# Patient Record
Sex: MALE | Race: WHITE | Employment: OTHER | URBAN - METROPOLITAN AREA
[De-identification: names, ages, dates, MRNs, and addresses within clinical notes are randomized per-mention and may not be internally consistent; named-entity substitution may affect disease eponyms.]

---

## 2017-05-30 PROBLEM — M47.812 CERVICAL ARTHRITIS: Status: ACTIVE | Noted: 2017-05-30

## 2017-10-12 ENCOUNTER — HOSPITAL ENCOUNTER (OUTPATIENT)
Dept: LAB | Age: 72
Discharge: HOME | End: 2017-10-12

## 2017-10-12 PROCEDURE — 88305 TISSUE EXAM BY PATHOLOGIST: CPT | Performed by: INTERNAL MEDICINE

## 2017-12-07 ENCOUNTER — HOSPITAL ENCOUNTER (OUTPATIENT)
Dept: CT IMAGING | Age: 72
Discharge: HOME | End: 2017-12-07
Attending: OTOLARYNGOLOGY
Payer: MEDICARE

## 2017-12-07 DIAGNOSIS — J32.9 CHRONIC SINUSITIS, UNSPECIFIED LOCATION: ICD-10-CM

## 2017-12-07 PROCEDURE — 70486 CT MAXILLOFACIAL W/O DYE: CPT

## 2018-01-08 ENCOUNTER — HOSPITAL ENCOUNTER (OUTPATIENT)
Dept: SURGERY | Age: 73
Discharge: HOME | End: 2018-01-08
Attending: OTOLARYNGOLOGY

## 2018-01-08 VITALS
BODY MASS INDEX: 32.21 KG/M2 | WEIGHT: 225 LBS | SYSTOLIC BLOOD PRESSURE: 180 MMHG | RESPIRATION RATE: 18 BRPM | HEART RATE: 62 BPM | TEMPERATURE: 96.5 F | HEIGHT: 70 IN | DIASTOLIC BLOOD PRESSURE: 84 MMHG | OXYGEN SATURATION: 96 %

## 2018-01-08 RX ORDER — FLUOCINOLONE ACETONIDE 0.25 MG/G
CREAM TOPICAL 2 TIMES DAILY
COMMUNITY

## 2018-01-08 RX ORDER — HYOSCYAMINE SULFATE 0.12 MG/1
0.12 TABLET SUBLINGUAL
COMMUNITY
End: 2018-08-29

## 2018-01-08 NOTE — PERIOP NOTES
Patient verified name, , and surgery as listed in [facility]. Type 1B surgery, PAT walk-in assessment complete. Labs per surgeon: No orders received at this time. Orders to be faxed from Dr. Raul Grimm office to [phone]. Labs per anesthesia protocol: None. EKG: None per anesthesia protocol. Hibiclens and instructions given per hospital policy. Patient provided with and instructed on educational handouts including Guide to Surgery, Pain Management, Hand Hygiene, Blood Transfusion Education, and [place] [facility] Brochure. Patient answered medical/surgical history questions at their best of ability. All prior to admission medications documented in [facility]. Original medication prescription bottle NOT visualized during patient appointment. Patient instructed to hold all vitamins 7 days prior to surgery and NSAIDS 5 days prior to surgery, patient verbalized understanding. Medications to be held: all vitamins/supplements, Celebrex, Diflorasone ointment, fluocinolone, hydrocortisone ointment. Patient instructed to continue previous medications as prescribed prior to surgery and to take the following medications the day of surgery according to anesthesia guidelines with a small sip of water: Tramadol if needed, Tamsulosin, Dipentum, Omeprazole, and Flexeril if needed. Patient teach back successful and patient demonstrates knowledge of instructions.

## 2018-01-14 ENCOUNTER — ANESTHESIA EVENT (OUTPATIENT)
Dept: SURGERY | Age: 73
End: 2018-01-14
Payer: MEDICARE

## 2018-01-15 ENCOUNTER — ANESTHESIA (OUTPATIENT)
Dept: SURGERY | Age: 73
End: 2018-01-15
Payer: MEDICARE

## 2018-01-15 ENCOUNTER — HOSPITAL ENCOUNTER (OUTPATIENT)
Age: 73
Setting detail: OUTPATIENT SURGERY
Discharge: HOME | End: 2018-01-15
Attending: OTOLARYNGOLOGY | Admitting: OTOLARYNGOLOGY
Payer: MEDICARE

## 2018-01-15 VITALS
OXYGEN SATURATION: 93 % | WEIGHT: 227 LBS | RESPIRATION RATE: 16 BRPM | HEART RATE: 59 BPM | TEMPERATURE: 97.4 F | SYSTOLIC BLOOD PRESSURE: 156 MMHG | DIASTOLIC BLOOD PRESSURE: 67 MMHG | BODY MASS INDEX: 32.57 KG/M2

## 2018-01-15 PROCEDURE — 76210000020 HC REC RM PH II FIRST 0.5 HR: Performed by: OTOLARYNGOLOGY

## 2018-01-15 PROCEDURE — 74011250636 HC RX REV CODE- 250/636

## 2018-01-15 PROCEDURE — 74011000250 HC RX REV CODE- 250: Performed by: OTOLARYNGOLOGY

## 2018-01-15 PROCEDURE — 77030018836 HC SOL IRR NACL ICUM -A: Performed by: OTOLARYNGOLOGY

## 2018-01-15 PROCEDURE — 77030036727 HC DEV INFL BLN SNUS SE DISP ACCL -B: Performed by: OTOLARYNGOLOGY

## 2018-01-15 PROCEDURE — 77030008477 HC STYL SATN SLP COVD -A: Performed by: ANESTHESIOLOGY

## 2018-01-15 PROCEDURE — 74011000250 HC RX REV CODE- 250: Performed by: ANESTHESIOLOGY

## 2018-01-15 PROCEDURE — 77030020782 HC GWN BAIR PAWS FLX 3M -B: Performed by: ANESTHESIOLOGY

## 2018-01-15 PROCEDURE — 76060000032 HC ANESTHESIA 0.5 TO 1 HR: Performed by: OTOLARYNGOLOGY

## 2018-01-15 PROCEDURE — C1726 CATH, BAL DIL, NON-VASCULAR: HCPCS | Performed by: OTOLARYNGOLOGY

## 2018-01-15 PROCEDURE — 77030008703 HC TU ET UNCUF COVD -A: Performed by: ANESTHESIOLOGY

## 2018-01-15 PROCEDURE — 74011250636 HC RX REV CODE- 250/636: Performed by: ANESTHESIOLOGY

## 2018-01-15 PROCEDURE — 77030036884 HC CATH GD SPNPLS RELV TIP DISP KT ACCL -G1: Performed by: OTOLARYNGOLOGY

## 2018-01-15 PROCEDURE — 74011000250 HC RX REV CODE- 250

## 2018-01-15 PROCEDURE — 76210000016 HC OR PH I REC 1 TO 1.5 HR: Performed by: OTOLARYNGOLOGY

## 2018-01-15 PROCEDURE — 77030006907 HC BLD SNUS SHV MEDT -C: Performed by: OTOLARYNGOLOGY

## 2018-01-15 PROCEDURE — 76010000138 HC OR TIME 0.5 TO 1 HR: Performed by: OTOLARYNGOLOGY

## 2018-01-15 PROCEDURE — 74011250637 HC RX REV CODE- 250/637: Performed by: OTOLARYNGOLOGY

## 2018-01-15 PROCEDURE — 74011250637 HC RX REV CODE- 250/637: Performed by: ANESTHESIOLOGY

## 2018-01-15 RX ORDER — PROPOFOL 10 MG/ML
INJECTION, EMULSION INTRAVENOUS AS NEEDED
Status: DISCONTINUED | OUTPATIENT
Start: 2018-01-15 | End: 2018-01-15 | Stop reason: HOSPADM

## 2018-01-15 RX ORDER — ROCURONIUM BROMIDE 10 MG/ML
INJECTION, SOLUTION INTRAVENOUS AS NEEDED
Status: DISCONTINUED | OUTPATIENT
Start: 2018-01-15 | End: 2018-01-15 | Stop reason: HOSPADM

## 2018-01-15 RX ORDER — OXYCODONE HYDROCHLORIDE 5 MG/1
10 TABLET ORAL
Status: DISCONTINUED | OUTPATIENT
Start: 2018-01-15 | End: 2018-01-16 | Stop reason: HOSPADM

## 2018-01-15 RX ORDER — OXYMETAZOLINE HCL 0.05 %
SPRAY, NON-AEROSOL (ML) NASAL AS NEEDED
Status: DISCONTINUED | OUTPATIENT
Start: 2018-01-15 | End: 2018-01-15 | Stop reason: HOSPADM

## 2018-01-15 RX ORDER — GLYCOPYRROLATE 0.2 MG/ML
INJECTION INTRAMUSCULAR; INTRAVENOUS AS NEEDED
Status: DISCONTINUED | OUTPATIENT
Start: 2018-01-15 | End: 2018-01-15 | Stop reason: HOSPADM

## 2018-01-15 RX ORDER — DIPHENHYDRAMINE HYDROCHLORIDE 50 MG/ML
12.5 INJECTION, SOLUTION INTRAMUSCULAR; INTRAVENOUS
Status: DISCONTINUED | OUTPATIENT
Start: 2018-01-15 | End: 2018-01-16 | Stop reason: HOSPADM

## 2018-01-15 RX ORDER — LABETALOL HYDROCHLORIDE 5 MG/ML
INJECTION, SOLUTION INTRAVENOUS AS NEEDED
Status: DISCONTINUED | OUTPATIENT
Start: 2018-01-15 | End: 2018-01-15 | Stop reason: HOSPADM

## 2018-01-15 RX ORDER — NEOSTIGMINE METHYLSULFATE 1 MG/ML
INJECTION INTRAVENOUS AS NEEDED
Status: DISCONTINUED | OUTPATIENT
Start: 2018-01-15 | End: 2018-01-15 | Stop reason: HOSPADM

## 2018-01-15 RX ORDER — HYDROMORPHONE HYDROCHLORIDE 2 MG/ML
0.5 INJECTION, SOLUTION INTRAMUSCULAR; INTRAVENOUS; SUBCUTANEOUS
Status: DISCONTINUED | OUTPATIENT
Start: 2018-01-15 | End: 2018-01-16 | Stop reason: HOSPADM

## 2018-01-15 RX ORDER — NALOXONE HYDROCHLORIDE 0.4 MG/ML
0.1 INJECTION, SOLUTION INTRAMUSCULAR; INTRAVENOUS; SUBCUTANEOUS
Status: DISCONTINUED | OUTPATIENT
Start: 2018-01-15 | End: 2018-01-16 | Stop reason: HOSPADM

## 2018-01-15 RX ORDER — LIDOCAINE HYDROCHLORIDE 10 MG/ML
0.1 INJECTION INFILTRATION; PERINEURAL AS NEEDED
Status: DISCONTINUED | OUTPATIENT
Start: 2018-01-15 | End: 2018-01-15 | Stop reason: HOSPADM

## 2018-01-15 RX ORDER — FENTANYL CITRATE 50 UG/ML
INJECTION, SOLUTION INTRAMUSCULAR; INTRAVENOUS AS NEEDED
Status: DISCONTINUED | OUTPATIENT
Start: 2018-01-15 | End: 2018-01-15 | Stop reason: HOSPADM

## 2018-01-15 RX ORDER — SODIUM CHLORIDE, SODIUM LACTATE, POTASSIUM CHLORIDE, CALCIUM CHLORIDE 600; 310; 30; 20 MG/100ML; MG/100ML; MG/100ML; MG/100ML
75 INJECTION, SOLUTION INTRAVENOUS CONTINUOUS
Status: DISCONTINUED | OUTPATIENT
Start: 2018-01-15 | End: 2018-01-15 | Stop reason: HOSPADM

## 2018-01-15 RX ORDER — ACETAMINOPHEN 500 MG
1000 TABLET ORAL ONCE
Status: COMPLETED | OUTPATIENT
Start: 2018-01-15 | End: 2018-01-15

## 2018-01-15 RX ORDER — LIDOCAINE HYDROCHLORIDE 20 MG/ML
INJECTION, SOLUTION EPIDURAL; INFILTRATION; INTRACAUDAL; PERINEURAL AS NEEDED
Status: DISCONTINUED | OUTPATIENT
Start: 2018-01-15 | End: 2018-01-15 | Stop reason: HOSPADM

## 2018-01-15 RX ORDER — FLUMAZENIL 0.1 MG/ML
0.2 INJECTION INTRAVENOUS AS NEEDED
Status: DISCONTINUED | OUTPATIENT
Start: 2018-01-15 | End: 2018-01-16 | Stop reason: HOSPADM

## 2018-01-15 RX ORDER — SODIUM CHLORIDE, SODIUM LACTATE, POTASSIUM CHLORIDE, CALCIUM CHLORIDE 600; 310; 30; 20 MG/100ML; MG/100ML; MG/100ML; MG/100ML
75 INJECTION, SOLUTION INTRAVENOUS CONTINUOUS
Status: DISCONTINUED | OUTPATIENT
Start: 2018-01-15 | End: 2018-01-16 | Stop reason: HOSPADM

## 2018-01-15 RX ORDER — OXYCODONE HYDROCHLORIDE 5 MG/1
5 TABLET ORAL
Status: DISCONTINUED | OUTPATIENT
Start: 2018-01-15 | End: 2018-01-16 | Stop reason: HOSPADM

## 2018-01-15 RX ORDER — LIDOCAINE HYDROCHLORIDE AND EPINEPHRINE 10; 10 MG/ML; UG/ML
INJECTION, SOLUTION INFILTRATION; PERINEURAL AS NEEDED
Status: DISCONTINUED | OUTPATIENT
Start: 2018-01-15 | End: 2018-01-15 | Stop reason: HOSPADM

## 2018-01-15 RX ADMIN — LIDOCAINE HYDROCHLORIDE 80 MG: 20 INJECTION, SOLUTION EPIDURAL; INFILTRATION; INTRACAUDAL; PERINEURAL at 08:25

## 2018-01-15 RX ADMIN — PROPOFOL 200 MG: 10 INJECTION, EMULSION INTRAVENOUS at 08:25

## 2018-01-15 RX ADMIN — ACETAMINOPHEN 1000 MG: 500 TABLET, FILM COATED ORAL at 07:19

## 2018-01-15 RX ADMIN — FENTANYL CITRATE 100 MCG: 50 INJECTION, SOLUTION INTRAMUSCULAR; INTRAVENOUS at 08:25

## 2018-01-15 RX ADMIN — GLYCOPYRROLATE 0.4 MG: 0.2 INJECTION INTRAMUSCULAR; INTRAVENOUS at 09:02

## 2018-01-15 RX ADMIN — SODIUM CHLORIDE, SODIUM LACTATE, POTASSIUM CHLORIDE, AND CALCIUM CHLORIDE 75 ML/HR: 600; 310; 30; 20 INJECTION, SOLUTION INTRAVENOUS at 07:19

## 2018-01-15 RX ADMIN — LIDOCAINE HYDROCHLORIDE 0.1 ML: 10 INJECTION, SOLUTION INFILTRATION; PERINEURAL at 07:19

## 2018-01-15 RX ADMIN — HYDROMORPHONE HYDROCHLORIDE 0.5 MG: 2 INJECTION, SOLUTION INTRAMUSCULAR; INTRAVENOUS; SUBCUTANEOUS at 10:00

## 2018-01-15 RX ADMIN — HYDROMORPHONE HYDROCHLORIDE 0.5 MG: 2 INJECTION, SOLUTION INTRAMUSCULAR; INTRAVENOUS; SUBCUTANEOUS at 09:38

## 2018-01-15 RX ADMIN — ROCURONIUM BROMIDE 40 MG: 10 INJECTION, SOLUTION INTRAVENOUS at 08:25

## 2018-01-15 RX ADMIN — HYDROMORPHONE HYDROCHLORIDE 0.5 MG: 2 INJECTION, SOLUTION INTRAMUSCULAR; INTRAVENOUS; SUBCUTANEOUS at 09:26

## 2018-01-15 RX ADMIN — LABETALOL HYDROCHLORIDE 10 MG: 5 INJECTION, SOLUTION INTRAVENOUS at 09:09

## 2018-01-15 RX ADMIN — NEOSTIGMINE METHYLSULFATE 3 MG: 1 INJECTION INTRAVENOUS at 09:02

## 2018-01-15 NOTE — ANESTHESIA POSTPROCEDURE EVALUATION
Post-Anesthesia Evaluation and Assessment    Patient: Natan Guerra MRN: [id number]  SSN: [id number]    YOB: 1945  Age: 74 y.o. Sex: male       Cardiovascular Function/Vital Signs  Visit Vitals    /82 (BP 1 Location: Right arm, BP Patient Position: At rest)    Pulse (!) 59    Temp 36.3 °C (97.4 °F)    Resp 16    Wt 103 kg (227 lb)    SpO2 93%    BMI 32.57 kg/m2       Patient is status post general anesthesia for Procedure(s):  BILATERAL MAXILLARY AND FRONTAL AND LEFT SPHENOID BALLOON SINUPLASTY WITH REMOVAL OF CONTENTS AND  LEFT MORENO BULLOSA  TURBINATE REDUCTION  LEFT EUSTACIAN TUBE BALLOON DILATION. Nausea/Vomiting: None    Postoperative hydration reviewed and adequate. Pain:  Pain Scale 1: Numeric (0 - 10) (01/15/18 1000)  Pain Intensity 1: 6 (01/15/18 1000)   Managed    Neurological Status:   Neuro (WDL): Exceptions to WDL (01/15/18 0913)  Neuro  Neurologic State: Drowsy (01/15/18 6510)  Orientation Level: Oriented to person (01/15/18 0913)  Cognition: Decreased attention/concentration; Follows commands (01/15/18 0913)  LUE Motor Response: Purposeful (01/15/18 0913)  LLE Motor Response: Purposeful (01/15/18 0913)  RUE Motor Response: Purposeful (01/15/18 0913)  RLE Motor Response: Purposeful (01/15/18 0913)   At baseline    Mental Status and Level of Consciousness: Arousable    Pulmonary Status:   O2 Device: Room air (01/15/18 1000)   Adequate oxygenation and airway patent    Complications related to anesthesia: None    Post-anesthesia assessment completed.  No concerns    Signed By: Cassie Moeller MD     January 15, 2018

## 2018-01-15 NOTE — ANESTHESIA PREPROCEDURE EVALUATION
Anesthetic History   No history of anesthetic complications            Review of Systems / Medical History  Patient summary reviewed and pertinent labs reviewed    Pulmonary  Within defined limits                 Neuro/Psych         Psychiatric history     Cardiovascular  Within defined limits                Exercise tolerance: >4 METS     GI/Hepatic/Renal  Within defined limits              Endo/Other        Obesity and arthritis     Other Findings              Physical Exam    Airway  Mallampati: II  TM Distance: 4 - 6 cm  Neck ROM: normal range of motion   Mouth opening: Normal     Cardiovascular    Rhythm: regular  Rate: normal         Dental  No notable dental hx       Pulmonary  Breath sounds clear to auscultation               Abdominal         Other Findings            Anesthetic Plan    ASA: 2  Anesthesia type: general            Anesthetic plan and risks discussed with: Patient and Spouse

## 2018-01-15 NOTE — OP NOTES
[facility] 63  OPERATIVE REPORT    Marito Escalera  MR#: [id number]  : 1945  ACCOUNT #: [de-identified]   DATE OF SERVICE: 01/15/2018    PREOPERATIVE DIAGNOSES:  Chronic sinusitis, inferior turbinate hypertrophy, nasal obstruction, chronic left eustachian tube dysfunction. POSTOPERATIVE DIAGNOSES:  Chronic sinusitis, inferior turbinate hypertrophy, nasal obstruction, chronic left eustachian tube dysfunction. PROCEDURES PERFORMED:  Left eustachian tube balloon dilation, bilateral submucosal resection of the inferior turbinates, left sphenoid balloon sinuplasty with removal of contents, bilateral frontal balloon sinuplasty with removal of contents, bilateral maxillary balloon sinuplasty with removal of contents. SURGEON:  Dr. Josey Loja. ASSISTANT:  None. ANESTHESIA:  General.    COMPLICATIONS:  None. ESTIMATED BLOOD LOSS:  50 mL. IMPLANTS:  None. HISTORY:  A 78-year-old male who came to see me in the office with a long history of chronic sinus issues. I have been seeing him for the last couple of years and it was about a year ago that I recommended he undergo balloon sinuplasty and at that point, he decided to hold off as he was starting to feel just a little bit better, but then he started getting worse again. He is having problems chronically with the left ear. He is getting facial pressure in the maxillary, ethmoid and frontal sinuses. He has had several sinus infections over the last year. He has been on nasal steroid sprays, antihistamines, he has been on antibiotics. The antibiotics do help the infection when he takes it, but there is nothing that is giving him any type of chronic improvement. There is nasal congestion that switches sides depending on which side he is lying on. He gets a nasal sound to his voice chronically. There is still a constant pressure in the left ear and it is on and off on the right side.   This is enough to bother him every day and it does affect his daily activity. Physical exam revealed a deviated nasal septum to the left. This was mild-to-moderate in nature. He had bilateral inferior turbinate hypertrophy. Ear exam was unremarkable that day in the office. I did check a CT scan, which showed there to be a left filiberto bullosa, bilateral maxillary, ethmoid, frontal and left sphenoid mucosal thickening. So, based on the history and physical exam, it was my recommendation he undergo a left eustachian tube balloon dilation, bilateral maxillary, bilateral frontal and left sphenoid balloon sinuplasty, possible biopsy. The procedure, risks and benefits were discussed with him in the office. All questions were answered and he is agreeable to the surgery. SURGERY:  The patient was identified in the preoperative waiting area. He was taken back to the operating room where he underwent general anesthesia. Approximately 4 mL of 1% lidocaine with epinephrine were injected into the inferior turbinates, middle turbinates and then Afrin-soaked pledgets were placed in each side of the nose and left there for a few minutes. These were then removed. Eustachian tube balloon dilation was done first.  The scope was placed in the left side of the nose. Photo was taken of the left eustachian tube opening. The scope was then placed on the right side of the nose visualizing the left eustachian tube opening and then under visualization, I was able to insert the balloon the full length of the balloon. Intraop and post-dilation pictures were taken. When the balloon was then inserted, the balloon was inflated to a pressure of 12, held for 2 minutes, which was done on a timer, deflated and then the balloon was removed. Again, the post-dilation picture was taken. Then, the nasal part was done next.   So, starting with the left sphenoid sinus, I was able to advance the guidewire and then advance the balloon. The balloon was inflated to a pressure of 12, deflated. The sinus was irrigated using 120 mL of saline. Reinspection revealed thickened mucosa along the floor, which was removed, and the sinus appeared to be open and clear. Frontal sinuses were done next. Starting on the left was the same procedure on the right. I was able to feed the guidewire and a balloon to the frontal sinuses. The balloons were inflated to a pressure of 12, deflated. The sinuses were irrigated using 120 mL of saline each. Reinspection revealed thick mucosa within the nasal frontal duct, which was removed and the sinuses appeared to be open and clear. Maxillary sinuses were done last.  Starting on the left was the same thing on the right. I was able to feed the guidewire and the balloons into the maxillary sinuses. The balloons were inflated to a pressure of 12, deflated. The sinuses were irrigated using 120 mL of saline each. Reinspection revealed thickened mucosa just inside the sinus openings posteriorly, which was removed and the sinuses appeared to be open and clear. There was also an accessory ostia that was found on the left side, which was left the way it was. It measured approximately 3 mm in size. The filiberto bullosa on the left was collapsed. Using a [place] elevator and then microdebrider with a 4 mm blade was used to remove the lateral portion of the filiberto bullosa, essentially opening and removing the filiberto bullosa. A knife was then used to make a small stab incision in the anterior portion of the inferior turbinates. Melodye Ray was used to create a small pocket between the bone of the inferior turbinate, mucosa medially and a microdebrider with a turbinate blade was used to reduce the prominent bone and tissue at the inferior turbinates bilaterally. Boies elevator was used to medialize and lateralize the inferior turbinates giving the patient a widely patent nasal airway.   Some blood was suctioned from the nose and nasopharynx. The patient was awakened and taken to the postop recovery room in stable condition.       DO JUSTICE Solis  D: 01/15/2018 09:07     T: 01/15/2018 09:37  JOB #: [id number]

## 2018-04-17 ENCOUNTER — HOSPITAL ENCOUNTER (OUTPATIENT)
Dept: SURGERY | Age: 73
Discharge: HOME | End: 2018-04-17

## 2018-04-18 VITALS — BODY MASS INDEX: 32.64 KG/M2 | WEIGHT: 228 LBS | HEIGHT: 70 IN

## 2018-04-22 ENCOUNTER — ANESTHESIA EVENT (OUTPATIENT)
Dept: SURGERY | Age: 73
End: 2018-04-22
Payer: MEDICARE

## 2018-04-23 ENCOUNTER — HOSPITAL ENCOUNTER (OUTPATIENT)
Age: 73
Setting detail: OUTPATIENT SURGERY
Discharge: HOME | End: 2018-04-23
Attending: OTOLARYNGOLOGY | Admitting: OTOLARYNGOLOGY
Payer: MEDICARE

## 2018-04-23 ENCOUNTER — ANESTHESIA (OUTPATIENT)
Dept: SURGERY | Age: 73
End: 2018-04-23
Payer: MEDICARE

## 2018-04-23 VITALS
SYSTOLIC BLOOD PRESSURE: 165 MMHG | HEART RATE: 59 BPM | HEIGHT: 70 IN | OXYGEN SATURATION: 95 % | WEIGHT: 228 LBS | BODY MASS INDEX: 32.64 KG/M2 | DIASTOLIC BLOOD PRESSURE: 82 MMHG | TEMPERATURE: 98.4 F | RESPIRATION RATE: 16 BRPM

## 2018-04-23 PROCEDURE — 76060000032 HC ANESTHESIA 0.5 TO 1 HR: Performed by: OTOLARYNGOLOGY

## 2018-04-23 PROCEDURE — 77030031139 HC SUT VCRL2 J&J -A: Performed by: OTOLARYNGOLOGY

## 2018-04-23 PROCEDURE — 74011250636 HC RX REV CODE- 250/636: Performed by: ANESTHESIOLOGY

## 2018-04-23 PROCEDURE — C1762 CONN TISS, HUMAN(INC FASCIA): HCPCS | Performed by: OTOLARYNGOLOGY

## 2018-04-23 PROCEDURE — 76210000016 HC OR PH I REC 1 TO 1.5 HR: Performed by: OTOLARYNGOLOGY

## 2018-04-23 PROCEDURE — 74011000250 HC RX REV CODE- 250: Performed by: OTOLARYNGOLOGY

## 2018-04-23 PROCEDURE — 74011250636 HC RX REV CODE- 250/636

## 2018-04-23 PROCEDURE — 74011250637 HC RX REV CODE- 250/637: Performed by: OTOLARYNGOLOGY

## 2018-04-23 PROCEDURE — 74011000250 HC RX REV CODE- 250

## 2018-04-23 PROCEDURE — 77030008703 HC TU ET UNCUF COVD -A: Performed by: ANESTHESIOLOGY

## 2018-04-23 PROCEDURE — 77030018836 HC SOL IRR NACL ICUM -A: Performed by: OTOLARYNGOLOGY

## 2018-04-23 PROCEDURE — 76010000154 HC OR TIME FIRST 0.5 HR: Performed by: OTOLARYNGOLOGY

## 2018-04-23 DEVICE — IMPLANTABLE DEVICE: Type: IMPLANTABLE DEVICE | Site: NOSE | Status: FUNCTIONAL

## 2018-04-23 RX ORDER — NALOXONE HYDROCHLORIDE 0.4 MG/ML
0.1 INJECTION, SOLUTION INTRAMUSCULAR; INTRAVENOUS; SUBCUTANEOUS AS NEEDED
Status: DISCONTINUED | OUTPATIENT
Start: 2018-04-23 | End: 2018-04-23 | Stop reason: HOSPADM

## 2018-04-23 RX ORDER — LIDOCAINE HYDROCHLORIDE 20 MG/ML
INJECTION, SOLUTION EPIDURAL; INFILTRATION; INTRACAUDAL; PERINEURAL AS NEEDED
Status: DISCONTINUED | OUTPATIENT
Start: 2018-04-23 | End: 2018-04-23 | Stop reason: HOSPADM

## 2018-04-23 RX ORDER — DIPHENHYDRAMINE HYDROCHLORIDE 50 MG/ML
12.5 INJECTION, SOLUTION INTRAMUSCULAR; INTRAVENOUS ONCE
Status: DISCONTINUED | OUTPATIENT
Start: 2018-04-23 | End: 2018-04-23 | Stop reason: HOSPADM

## 2018-04-23 RX ORDER — OXYCODONE HYDROCHLORIDE 5 MG/1
5 TABLET ORAL
Status: DISCONTINUED | OUTPATIENT
Start: 2018-04-23 | End: 2018-04-23 | Stop reason: HOSPADM

## 2018-04-23 RX ORDER — SODIUM CHLORIDE 0.9 % (FLUSH) 0.9 %
5-10 SYRINGE (ML) INJECTION EVERY 8 HOURS
Status: DISCONTINUED | OUTPATIENT
Start: 2018-04-23 | End: 2018-04-23 | Stop reason: HOSPADM

## 2018-04-23 RX ORDER — LIDOCAINE HYDROCHLORIDE 10 MG/ML
0.1 INJECTION INFILTRATION; PERINEURAL AS NEEDED
Status: DISCONTINUED | OUTPATIENT
Start: 2018-04-23 | End: 2018-04-23 | Stop reason: HOSPADM

## 2018-04-23 RX ORDER — LIDOCAINE HYDROCHLORIDE AND EPINEPHRINE 10; 10 MG/ML; UG/ML
INJECTION, SOLUTION INFILTRATION; PERINEURAL AS NEEDED
Status: DISCONTINUED | OUTPATIENT
Start: 2018-04-23 | End: 2018-04-23 | Stop reason: HOSPADM

## 2018-04-23 RX ORDER — PROPOFOL 10 MG/ML
INJECTION, EMULSION INTRAVENOUS AS NEEDED
Status: DISCONTINUED | OUTPATIENT
Start: 2018-04-23 | End: 2018-04-23 | Stop reason: HOSPADM

## 2018-04-23 RX ORDER — ONDANSETRON 2 MG/ML
4 INJECTION INTRAMUSCULAR; INTRAVENOUS ONCE
Status: DISCONTINUED | OUTPATIENT
Start: 2018-04-23 | End: 2018-04-23 | Stop reason: HOSPADM

## 2018-04-23 RX ORDER — SUCCINYLCHOLINE CHLORIDE 20 MG/ML
INJECTION INTRAMUSCULAR; INTRAVENOUS AS NEEDED
Status: DISCONTINUED | OUTPATIENT
Start: 2018-04-23 | End: 2018-04-23 | Stop reason: HOSPADM

## 2018-04-23 RX ORDER — ROCURONIUM BROMIDE 10 MG/ML
INJECTION, SOLUTION INTRAVENOUS AS NEEDED
Status: DISCONTINUED | OUTPATIENT
Start: 2018-04-23 | End: 2018-04-23 | Stop reason: HOSPADM

## 2018-04-23 RX ORDER — HYDROMORPHONE HYDROCHLORIDE 2 MG/ML
0.5 INJECTION, SOLUTION INTRAMUSCULAR; INTRAVENOUS; SUBCUTANEOUS
Status: DISCONTINUED | OUTPATIENT
Start: 2018-04-23 | End: 2018-04-23 | Stop reason: HOSPADM

## 2018-04-23 RX ORDER — ACETAMINOPHEN 500 MG
500 TABLET ORAL ONCE
Status: DISCONTINUED | OUTPATIENT
Start: 2018-04-23 | End: 2018-04-23 | Stop reason: HOSPADM

## 2018-04-23 RX ORDER — SODIUM CHLORIDE, SODIUM LACTATE, POTASSIUM CHLORIDE, CALCIUM CHLORIDE 600; 310; 30; 20 MG/100ML; MG/100ML; MG/100ML; MG/100ML
INJECTION, SOLUTION INTRAVENOUS
Status: DISCONTINUED | OUTPATIENT
Start: 2018-04-23 | End: 2018-04-23 | Stop reason: HOSPADM

## 2018-04-23 RX ORDER — OXYCODONE HYDROCHLORIDE 5 MG/1
10 TABLET ORAL
Status: DISCONTINUED | OUTPATIENT
Start: 2018-04-23 | End: 2018-04-23 | Stop reason: HOSPADM

## 2018-04-23 RX ORDER — SODIUM CHLORIDE 0.9 % (FLUSH) 0.9 %
5-10 SYRINGE (ML) INJECTION AS NEEDED
Status: DISCONTINUED | OUTPATIENT
Start: 2018-04-23 | End: 2018-04-23 | Stop reason: HOSPADM

## 2018-04-23 RX ORDER — SODIUM CHLORIDE, SODIUM LACTATE, POTASSIUM CHLORIDE, CALCIUM CHLORIDE 600; 310; 30; 20 MG/100ML; MG/100ML; MG/100ML; MG/100ML
1000 INJECTION, SOLUTION INTRAVENOUS CONTINUOUS
Status: DISCONTINUED | OUTPATIENT
Start: 2018-04-23 | End: 2018-04-23 | Stop reason: HOSPADM

## 2018-04-23 RX ORDER — MIDAZOLAM HYDROCHLORIDE 1 MG/ML
2 INJECTION, SOLUTION INTRAMUSCULAR; INTRAVENOUS
Status: DISCONTINUED | OUTPATIENT
Start: 2018-04-23 | End: 2018-04-23 | Stop reason: HOSPADM

## 2018-04-23 RX ORDER — SODIUM CHLORIDE, SODIUM LACTATE, POTASSIUM CHLORIDE, CALCIUM CHLORIDE 600; 310; 30; 20 MG/100ML; MG/100ML; MG/100ML; MG/100ML
75 INJECTION, SOLUTION INTRAVENOUS CONTINUOUS
Status: DISCONTINUED | OUTPATIENT
Start: 2018-04-23 | End: 2018-04-23 | Stop reason: HOSPADM

## 2018-04-23 RX ORDER — OXYMETAZOLINE HCL 0.05 %
SPRAY, NON-AEROSOL (ML) NASAL AS NEEDED
Status: DISCONTINUED | OUTPATIENT
Start: 2018-04-23 | End: 2018-04-23 | Stop reason: HOSPADM

## 2018-04-23 RX ORDER — FENTANYL CITRATE 50 UG/ML
INJECTION, SOLUTION INTRAMUSCULAR; INTRAVENOUS AS NEEDED
Status: DISCONTINUED | OUTPATIENT
Start: 2018-04-23 | End: 2018-04-23 | Stop reason: HOSPADM

## 2018-04-23 RX ORDER — FENTANYL CITRATE 50 UG/ML
100 INJECTION, SOLUTION INTRAMUSCULAR; INTRAVENOUS AS NEEDED
Status: DISCONTINUED | OUTPATIENT
Start: 2018-04-23 | End: 2018-04-23 | Stop reason: HOSPADM

## 2018-04-23 RX ADMIN — PROPOFOL 50 MG: 10 INJECTION, EMULSION INTRAVENOUS at 15:02

## 2018-04-23 RX ADMIN — ROCURONIUM BROMIDE 5 MG: 10 INJECTION, SOLUTION INTRAVENOUS at 14:52

## 2018-04-23 RX ADMIN — SODIUM CHLORIDE, SODIUM LACTATE, POTASSIUM CHLORIDE, CALCIUM CHLORIDE: 600; 310; 30; 20 INJECTION, SOLUTION INTRAVENOUS at 14:46

## 2018-04-23 RX ADMIN — FENTANYL CITRATE 100 MCG: 50 INJECTION, SOLUTION INTRAMUSCULAR; INTRAVENOUS at 14:48

## 2018-04-23 RX ADMIN — LIDOCAINE HYDROCHLORIDE 80 MG: 20 INJECTION, SOLUTION EPIDURAL; INFILTRATION; INTRACAUDAL; PERINEURAL at 14:52

## 2018-04-23 RX ADMIN — PROPOFOL 150 MG: 10 INJECTION, EMULSION INTRAVENOUS at 14:52

## 2018-04-23 RX ADMIN — SODIUM CHLORIDE, SODIUM LACTATE, POTASSIUM CHLORIDE, AND CALCIUM CHLORIDE 1000 ML: 600; 310; 30; 20 INJECTION, SOLUTION INTRAVENOUS at 14:09

## 2018-04-23 RX ADMIN — SUCCINYLCHOLINE CHLORIDE 100 MG: 20 INJECTION INTRAMUSCULAR; INTRAVENOUS at 14:52

## 2018-04-23 NOTE — ANESTHESIA POSTPROCEDURE EVALUATION
Post-Anesthesia Evaluation and Assessment    Patient: Mohini Marquez MRN: [id number]  SSN: [id number]    YOB: 1945  Age: 74 y.o. Sex: male       Cardiovascular Function/Vital Signs  Visit Vitals    /82    Pulse 68    Temp 36.9 °C (98.4 °F)    Resp 16    Ht 5' 10\" (1.778 m)    Wt 103.4 kg (228 lb)    SpO2 95%    BMI 32.71 kg/m2       Patient is status post general anesthesia for Procedure(s):  LEFT INTERNAL NASAL VALVE REPAIR WITH  GRAFT/ CADAVER CARTILEDGE. Nausea/Vomiting: None    Postoperative hydration reviewed and adequate. Pain:  Pain Scale 1: Numeric (0 - 10) (04/23/18 1521)  Pain Intensity 1: 0 (04/23/18 1521)   Managed    Neurological Status:   Neuro (WDL): Exceptions to WDL (04/23/18 1521)  Neuro  Neurologic State: Drowsy (04/23/18 1521)  Orientation Level: Oriented to person (04/23/18 1521)   At baseline    Mental Status and Level of Consciousness: Arousable    Pulmonary Status:   O2 Device: Room air (04/23/18 1521)   Adequate oxygenation and airway patent    Complications related to anesthesia: None    Post-anesthesia assessment completed.  No concerns    Signed By: Kelly Reinoso MD     April 23, 2018

## 2018-04-23 NOTE — DISCHARGE INSTRUCTIONS
INSTRUCTIONS FOLLOWING NASAL SURGERY      ACTIVITY   Do NOT blow your nose.  Use nasal saline frequently; every 20 minutes while awake.  Listen to your nose; if it hurts, don't do it. DIET   Clear, cool liquids the first day; then soft diet the second day   Advance to regular diet on third day, unless your doctor orders otherwise.  If nausea and vomiting continues, call your doctor. PAIN   Take pain medication as directed by your doctor.  Call your doctor if pain is NOT relieved by medication.  DO NOT take aspirin or blood thinners until directed by your doctor. FOLLOW-UP PHONE [address] will be made by nursing staff   If you have any problems, call your doctor as needed. CALL YOUR DOCTOR IF   Temperature of 10 1°F or above   Green or yellow discharge from nose    AFTER ANESTHESIA   For the first 24 hours: DO NOT Drive, Drink alcoholic beverages, or Make important decisions.  Be aware of dizziness following anesthesia and while taking pain medication.

## 2018-04-23 NOTE — IP AVS SNAPSHOT
[address] [address] 
[phone] Patient: Zeenat Guevara MRN: [id number] ZYL:[id number] About your hospitalization You were admitted on:  April 23, 2018 You last received care in the:  [facility] PACU You were discharged on:  April 23, 2018 Why you were hospitalized Your primary diagnosis was:  Not on File Follow-up Information Follow up With Details Comments Contact Info Thereasa Fothergill, [facility] [address] [phone] Your Scheduled Appointments Monday April 30, 2018  8:55 AM EDT  
POST OP with Nahed Dumont MD  
[facility] [address] [facility]) [address] [place] [address]  
[phone] Discharge Orders None A check lulu indicates which time of day the medication should be taken. My Medications ASK your doctor about these medications Instructions Each Dose to Equal  
 Morning Noon Evening Bedtime  
 aspirin 81 mg chewable tablet Your last dose was: Your next dose is: Take 81 mg by mouth every other day. 81 mg  
    
   
   
   
  
 B-100 PO Your last dose was: Your next dose is: Take  by mouth daily. celecoxib 200 mg capsule Commonly known as:  CELEBREX Your last dose was: Your next dose is: Take 1 Cap by mouth daily. Indications: OSTEOARTHRITIS  
 200 mg CHLOR-TABLET PO Your last dose was: Your next dose is: Take 1 Tab by mouth two (2) times daily as needed. 1 Tab  
    
   
   
   
  
 cyclobenzaprine 10 mg tablet Commonly known as:  FLEXERIL Your last dose was: Your next dose is: Take 1 Tab by mouth three (3) times daily as needed for Muscle Spasm(s).   
 10 mg  
    
   
   
   
  
 diflorasone diacetate 0.05 % ointment Commonly known as:  PSORCON Your last dose was: Your next dose is:    
   
   
 Apply  to affected area two (2) times daily as needed for Skin Irritation. FISH OIL 1,000 mg Cap Generic drug:  omega-3 fatty acids-vitamin e Your last dose was: Your next dose is: Take 1 Cap by mouth daily. 1 Cap  
    
   
   
   
  
 fluocinoLONE 0.025 % topical cream  
Commonly known as:  SYNALAR Your last dose was: Your next dose is:    
   
   
 Apply  to affected area two (2) times a day. fluticasone 50 mcg/actuation nasal spray Commonly known as:  Deetta Britain Your last dose was: Your next dose is:    
   
   
 USE 2 SPRAYS TO EACH NOSTRIL DAILY Gluc-Kang-MSM#1-Vit C-Marcelino-Bor 500-416.6-20 mg Tab Your last dose was: Your next dose is: Take  by mouth three (3) times daily. HYDROcodone-acetaminophen 5-325 mg per tablet Commonly known as:  Mathew Kenyon Your last dose was: Your next dose is: Take 1-2 tablets every 4-6 hours prn pain  
     
   
   
   
  
 ketoconazole 2 % topical cream  
Commonly known as:  NIZORAL Your last dose was: Your next dose is:    
   
   
 Apply  to affected area two (2) times daily as needed for Skin Irritation. LEVSIN/SL 0.125 mg SL tablet Generic drug:  hyoscyamine SL Your last dose was: Your next dose is:    
   
   
 0.125 mg by SubLINGual route every six (6) hours as needed for Cramping.  
 0.125 mg  
    
   
   
   
  
 loratadine 10 mg dissolvable tablet Commonly known as:  Eleazar Meyers Your last dose was: Your next dose is: Take 10 mg by mouth daily as needed. 10 mg  
    
   
   
   
  
 olsalazine 250 mg capsule Commonly known as:  DIPENTUM  
   
 Your last dose was: Your next dose is:    
   
   
 2 tablets every po daily  
     
   
   
   
  
 omeprazole 20 mg capsule Commonly known as:  PRILOSEC Your last dose was: Your next dose is: TAKE 1 CAPSULE DAILY FOR GASTROESOPHAGEAL REFLUX  
     
   
   
   
  
 ondansetron 4 mg disintegrating tablet Commonly known as:  ZOFRAN ODT Your last dose was: Your next dose is: Take 1 Tab by mouth every eight (8) hours as needed for Nausea. 4 mg  
    
   
   
   
  
 potassium 99 mg tablet Your last dose was: Your next dose is: Take 99 mg by mouth daily as needed. 99 mg  
    
   
   
   
  
 pseudoephedrine 30 mg tablet Commonly known as:  SUDAFED Your last dose was: Your next dose is: Take  by mouth every four (4) hours as needed for Congestion. tamsulosin 0.4 mg capsule Commonly known as:  FLOMAX Your last dose was: Your next dose is: TAKE 1 CAPSULE DAILY  
     
   
   
   
  
 traMADol 50 mg tablet Commonly known as:  ULTRAM  
   
Your last dose was: Your next dose is: Take 1 Tab by mouth every six (6) hours as needed for Pain. Max Daily Amount: 200 mg.  
 50 mg Opioid Education Prescription Opioids: What You Need to Know: 
 
 
 
ACTIVITY  Do NOT blow your nose.  Use nasal saline frequently; every 20 minutes while awake.  Listen to your nose; if it hurts, don't do it. DIET  Clear, cool liquids the first day; then soft diet the second day  Advance to regular diet on third day, unless your doctor orders otherwise.  If nausea and vomiting continues, call your doctor. PAIN 
 Take pain medication as directed by your doctor.  Call your doctor if pain is NOT relieved by medication.  DO NOT take aspirin or blood thinners until directed by your doctor. FOLLOW-UP PHONE CALLS  Calls will be made by nursing staff  If you have any problems, call your doctor as needed. CALL YOUR DOCTOR IF 
 Temperature of 10 1°F or above  Green or yellow discharge from nose AFTER ANESTHESIA  For the first 24 hours: DO NOT Drive, Drink alcoholic beverages, or Make important decisions.  Be aware of dizziness following anesthesia and while taking pain medication. ACO Transitions of Care Introducing [employer] [address] [facility]on offers a voluntary care coordination program to provide high quality service and care to [facility] fee-for-service beneficiaries. Usama Hunt was designed to help you enhance your health and well-being through the following services: ? Transitions of Care  support for individuals who are transitioning from one care setting to another (example: Hospital to home). ? Chronic and Complex Care Coordination  support for individuals and caregivers of those with serious or chronic illnesses or with more than one chronic (ongoing) condition and those who take a number of different medications. If you meet specific medical criteria, a [facility] [address] may call you directly to coordinate your care with your primary care physician and your other care providers. For questions about the [facility] programs, please, contact your physicians office. For general questions or additional information about Accountable Care Organizations: 
Please visit www.medicare.gov/acos. html or call 1-800-MEDICARE (1-328.971.9327) [facility] users should call 7-925.452.7535. Introducing [facility]! [address] [place] [address] introduces [employer] patient portal. Now you can access parts of your medical record, email your doctor's office, and request medication refills online. 1. In your internet browser, go to [email or web address] 2. Click on the First Time User? Click Here link in the Sign In box. You will see the New Member Sign Up page. 3. Enter your [employer] Access Code exactly as it appears below. You will not need to use this code after youve completed the sign-up process. If you do not sign up before the expiration date, you must request a new code. · [employer] Access Code: [email or web address] Expires: 7/9/2018  4:12 PM 
 
4. Enter the last four digits of your Social Security Number (xxxx) and Date of Birth (mm/dd/yyyy) as indicated and click Submit. You will be taken to the next sign-up page. 5. Create a [employer] ID. This will be your [employer] login ID and cannot be changed, so think of one that is secure and easy to remember. 6. Create a [employer] password. You can change your password at any time. 7. Enter your Password Reset Question and Answer. This can be used at a later time if you forget your password. 8. Enter your e-mail address. You will receive e-mail notification when new information is available in [address] 9. Click Sign Up. You can now view and download portions of your medical record. 10. Click the Download Summary menu link to download a portable copy of your medical information. If you have questions, please visit the Frequently Asked Questions section of the [employer] website. Remember, [employer] is NOT to be used for urgent needs. For medical emergencies, dial 911. Now available from your iPhone and Android! Introducing Shailesh Michel As a [address] [place] [address] patient, I wanted to make you aware of our electronic visit tool called Shailesh Michel. [employer] allows you to connect within minutes with a medical provider 24 hours a day, seven days a week via a mobile device or tablet or logging into a secure website from your computer. You can access [employer] from anywhere in the [place]. A virtual visit might be right for you when you have a simple condition and feel like you just dont want to get out of bed, or cant get away from work for an appointment, when your [facility]  provider is not available (evenings, weekends or holidays), or when youre out of town and need minor care. Electronic visits cost only $49 and if the [employer] provider determines a prescription is needed to treat your condition, one can be electronically transmitted to a nearby pharmacy*. Please take a moment to enroll today if you have not already done so. The enrollment process is free and takes just a few minutes. To enroll, please download the [employer] mel to your tablet or phone, or visit [email or web address] to enroll on your computer. And, as an [address] [place] [address] patient with a phorus account, the results of your visits will be scanned into your electronic medical record and your primary care provider will be able to view the scanned results. We urge you to continue to see your regular [facility]  provider for your ongoing medical care. And while your primary care provider may not be the one available when you seek a [employer] virtual visit, the peace of mind you get from getting a real diagnosis real time can be priceless. For more information on [employer], view our Frequently Asked Questions (FAQs) at [email or web address]. Sincerely, 
 
Tamiko Bassett MD 
Chief Medical Officer [facility] *:  certain medications cannot be prescribed via [employer] Providers Seen During Your Hospitalization Provider Specialty Primary office phone Gary Joyner DO Otolaryngology [phone] Your Primary Care Physician (PCP) Primary Care Physician Office Phone Office Fax [facility] [phone] You are allergic to the following Allergen Reactions Latex Other (comments) \"it burns\" No Known Drug Allergies Other (comments) Other Plant, Animal, Environmental Other (comments) DUST POLLENS 
[employer] Recent Documentation Height Weight BMI Smoking Status 1.778 m 103.4 kg 32.71 kg/m2 Never Smoker Emergency Contacts Name Discharge Info Relation Home Work Mobile [address] CAREGIVER [3] Son [22] [phone] [facility] DISCHARGE CAREGIVER [3] Spouse [3] [phone] Patient Belongings The following personal items are in your possession at time of discharge: 
  Dental Appliances: None Please provide this summary of care documentation to your next provider. Signatures-by signing, you are acknowledging that this After Visit Summary has been reviewed with you and you have received a copy. Patient Signature:  ____________________________________________________________ Date:  ____________________________________________________________  
  
[facility] Provider Signature:  ____________________________________________________________ Date:  ____________________________________________________________

## 2018-04-24 NOTE — OP NOTES
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 PROCEDURE: Left internal nasal valve repair with  graft. SURGEON: Leanne Vail DO.    ASSISTANT:  None. ANESTHESIA:  General.    COMPLICATIONS:  None. BLOOD LOSS:  Less than 5 mL. HISTORY: A 68-year-old male who came to see me in the office. He did undergo septoplasty and inferior turbinate reduction, along with some other things, and from that surgery, he has actually done very well and he is breathing a lot better. The problem is that even though he is breathing a lot better, he still notices a severe amount of left-sided nasal obstruction, again improved, but still severe to the point where it is causing him to mouth breathe. He notices an obvious difference between the two sides. Reinspection of the nose reveals the septum to be in the midline, appears to be straight. Inferior turbinate is small with anything at a level of normal inhalation and anything stronger than that, visualization of the internal nasal valve shows collapse of the left side, which causes nasal obstruction. He does have a positive Dickson test on the left, so based on history and physical exam, it was my recommendation that he undergo an internal nasal valve repair with  grafts on the left side, so procedure, risks and benefits were discussed with him in the office. All questions were answered and he is agreeable to the surgery. DESCRIPTION OF PROCEDURE: The patient was identified in the preoperative waiting area. He was taken back to the operating room where he underwent general anesthesia. Approximately 1 mL 1% lidocaine with epinephrine was injected in the intercartilaginous area on the left side. I was able to make a small stab incision where the upper lateral cartilage would meet the septum in the intercartilaginous space, and I did use a Austin to create a small pocket so  the upper lateral cartilage from the septum. I then took a piece of cadaver rib cartilage. It was cut to size measuring approximately 6-mm x 2 x 2-mm, and this was then inserted into that preformed pocket. Vicryl 4-0 was used to close the incision. At that point, the patient appeared to have more open internal nasal valve, and so the patient was then awakened and taken to the postop recovery room in stable condition.       DO JUSTICE [place] / [place]  D: 04/23/2018 16:10     T: 04/24/2018 09:31  JOB #: [id number]

## 2018-08-22 PROBLEM — M15.9 OSTEOARTHRITIS OF MULTIPLE JOINTS: Status: ACTIVE | Noted: 2018-08-22

## 2019-07-23 PROBLEM — Z87.448 PERSONAL HISTORY OF URETHRAL STRICTURE: Status: ACTIVE | Noted: 2019-04-09

## 2019-07-23 PROBLEM — N35.812 OTHER URETHRAL BULBOUS STRICTURE, MALE: Status: ACTIVE | Noted: 2018-12-28

## 2019-07-23 PROBLEM — R39.9 LOWER URINARY TRACT SYMPTOMS (LUTS): Status: ACTIVE | Noted: 2018-12-28

## 2019-07-23 PROBLEM — N40.0 BPH (BENIGN PROSTATIC HYPERPLASIA): Status: ACTIVE | Noted: 2018-11-16

## 2019-11-01 ENCOUNTER — HOSPITAL ENCOUNTER (OUTPATIENT)
Dept: LAB | Age: 74
Discharge: HOME | End: 2019-11-01

## 2019-11-01 PROCEDURE — 88312 SPECIAL STAINS GROUP 1: CPT

## 2019-11-01 PROCEDURE — 88305 TISSUE EXAM BY PATHOLOGIST: CPT

## 2020-08-14 ENCOUNTER — HOSPITAL ENCOUNTER (OUTPATIENT)
Dept: CT IMAGING | Age: 75
Discharge: HOME | End: 2020-08-14
Attending: PHYSICIAN ASSISTANT
Payer: MEDICARE

## 2020-08-14 DIAGNOSIS — J32.9 CHRONIC SINUSITIS, UNSPECIFIED LOCATION: ICD-10-CM

## 2020-08-14 PROCEDURE — 70486 CT MAXILLOFACIAL W/O DYE: CPT

## 2021-03-31 ENCOUNTER — HOSPITAL ENCOUNTER (OUTPATIENT)
Age: 76
Setting detail: OBSERVATION
Discharge: HOME | End: 2021-04-01
Attending: EMERGENCY MEDICINE | Admitting: FAMILY MEDICINE
Payer: MEDICARE

## 2021-03-31 ENCOUNTER — APPOINTMENT (OUTPATIENT)
Dept: CT IMAGING | Age: 76
End: 2021-03-31
Attending: EMERGENCY MEDICINE
Payer: MEDICARE

## 2021-03-31 DIAGNOSIS — R29.90 STROKE-LIKE SYMPTOM: Primary | ICD-10-CM

## 2021-03-31 PROBLEM — M47.812 CERVICAL ARTHRITIS: Status: RESOLVED | Noted: 2017-05-30 | Resolved: 2021-03-31

## 2021-03-31 PROBLEM — N35.812 OTHER URETHRAL BULBOUS STRICTURE, MALE: Status: RESOLVED | Noted: 2018-12-28 | Resolved: 2021-03-31

## 2021-03-31 PROBLEM — I10 ESSENTIAL HYPERTENSION: Chronic | Status: ACTIVE | Noted: 2021-03-31

## 2021-03-31 PROBLEM — M15.9 OSTEOARTHRITIS OF MULTIPLE JOINTS: Status: RESOLVED | Noted: 2018-08-22 | Resolved: 2021-03-31

## 2021-03-31 PROBLEM — G45.9 TIA (TRANSIENT ISCHEMIC ATTACK): Status: ACTIVE | Noted: 2021-03-31

## 2021-03-31 PROBLEM — R39.9 LOWER URINARY TRACT SYMPTOMS (LUTS): Status: RESOLVED | Noted: 2018-12-28 | Resolved: 2021-03-31

## 2021-03-31 PROBLEM — Z87.448 PERSONAL HISTORY OF URETHRAL STRICTURE: Status: RESOLVED | Noted: 2019-04-09 | Resolved: 2021-03-31

## 2021-03-31 LAB
ANION GAP SERPL CALC-SCNC: 2 MMOL/L (ref 7–16)
BASOPHILS # BLD: 0.1 K/UL (ref 0–0.2)
BASOPHILS NFR BLD: 1 % (ref 0–2)
BUN SERPL-MCNC: 27 MG/DL (ref 8–23)
CALCIUM SERPL-MCNC: 8.6 MG/DL (ref 8.3–10.4)
CHLORIDE SERPL-SCNC: 107 MMOL/L (ref 98–107)
CO2 SERPL-SCNC: 28 MMOL/L (ref 21–32)
CREAT SERPL-MCNC: 1.03 MG/DL (ref 0.8–1.5)
DIFFERENTIAL METHOD BLD: ABNORMAL
EOSINOPHIL # BLD: 0.7 K/UL (ref 0–0.8)
EOSINOPHIL NFR BLD: 6 % (ref 0.5–7.8)
ERYTHROCYTE [DISTWIDTH] IN BLOOD BY AUTOMATED COUNT: 11.9 % (ref 11.9–14.6)
GLUCOSE BLD STRIP.AUTO-MCNC: 125 MG/DL (ref 65–100)
GLUCOSE SERPL-MCNC: 133 MG/DL (ref 65–100)
HCT VFR BLD AUTO: 46.8 % (ref 41.1–50.3)
HGB BLD-MCNC: 16.6 G/DL (ref 13.6–17.2)
IMM GRANULOCYTES # BLD AUTO: 0.1 K/UL (ref 0–0.5)
IMM GRANULOCYTES NFR BLD AUTO: 0 % (ref 0–5)
INR BLD: 1.1 (ref 0.9–1.2)
INR PPP: 1.1
LYMPHOCYTES # BLD: 2.4 K/UL (ref 0.5–4.6)
LYMPHOCYTES NFR BLD: 20 % (ref 13–44)
MCH RBC QN AUTO: 34.8 PG (ref 26.1–32.9)
MCHC RBC AUTO-ENTMCNC: 35.5 G/DL (ref 31.4–35)
MCV RBC AUTO: 98.1 FL (ref 79.6–97.8)
MONOCYTES # BLD: 0.9 K/UL (ref 0.1–1.3)
MONOCYTES NFR BLD: 7 % (ref 4–12)
NEUTS SEG # BLD: 7.7 K/UL (ref 1.7–8.2)
NEUTS SEG NFR BLD: 65 % (ref 43–78)
NRBC # BLD: 0 K/UL (ref 0–0.2)
PLATELET # BLD AUTO: 276 K/UL (ref 150–450)
PMV BLD AUTO: 10.5 FL (ref 9.4–12.3)
POTASSIUM SERPL-SCNC: 3.2 MMOL/L (ref 3.5–5.1)
POTASSIUM SERPL-SCNC: 6.2 MMOL/L (ref 3.5–5.1)
PROTHROMBIN TIME: 14.1 SEC (ref 12.5–14.7)
PT BLD: 12.6 SECS (ref 9.6–11.6)
RBC # BLD AUTO: 4.77 M/UL (ref 4.23–5.6)
SERVICE CMNT-IMP: ABNORMAL
SODIUM SERPL-SCNC: 137 MMOL/L (ref 138–145)
TROPONIN-HIGH SENSITIVITY: 7.6 PG/ML (ref 0–14)
WBC # BLD AUTO: 11.9 K/UL (ref 4.3–11.1)

## 2021-03-31 PROCEDURE — 82962 GLUCOSE BLOOD TEST: CPT

## 2021-03-31 PROCEDURE — 74011000636 HC RX REV CODE- 636: Performed by: EMERGENCY MEDICINE

## 2021-03-31 PROCEDURE — 99218 HC RM OBSERVATION: CPT

## 2021-03-31 PROCEDURE — 84132 ASSAY OF SERUM POTASSIUM: CPT

## 2021-03-31 PROCEDURE — 85610 PROTHROMBIN TIME: CPT

## 2021-03-31 PROCEDURE — 70498 CT ANGIOGRAPHY NECK: CPT

## 2021-03-31 PROCEDURE — 93005 ELECTROCARDIOGRAM TRACING: CPT | Performed by: EMERGENCY MEDICINE

## 2021-03-31 PROCEDURE — 74011000250 HC RX REV CODE- 250: Performed by: EMERGENCY MEDICINE

## 2021-03-31 PROCEDURE — 0042T CT PERF W CBF: CPT

## 2021-03-31 PROCEDURE — 96374 THER/PROPH/DIAG INJ IV PUSH: CPT

## 2021-03-31 PROCEDURE — 85025 COMPLETE CBC W/AUTO DIFF WBC: CPT

## 2021-03-31 PROCEDURE — 99285 EMERGENCY DEPT VISIT HI MDM: CPT

## 2021-03-31 PROCEDURE — 70450 CT HEAD/BRAIN W/O DYE: CPT

## 2021-03-31 PROCEDURE — 74011000258 HC RX REV CODE- 258: Performed by: EMERGENCY MEDICINE

## 2021-03-31 PROCEDURE — 84484 ASSAY OF TROPONIN QUANT: CPT

## 2021-03-31 PROCEDURE — 2709999900 HC NON-CHARGEABLE SUPPLY

## 2021-03-31 PROCEDURE — 80048 BASIC METABOLIC PNL TOTAL CA: CPT

## 2021-03-31 RX ORDER — SODIUM CHLORIDE 0.9 % (FLUSH) 0.9 %
5-40 SYRINGE (ML) INJECTION EVERY 8 HOURS
Status: DISCONTINUED | OUTPATIENT
Start: 2021-03-31 | End: 2021-04-01 | Stop reason: HOSPADM

## 2021-03-31 RX ORDER — GUAIFENESIN 100 MG/5ML
81 LIQUID (ML) ORAL EVERY OTHER DAY
Status: DISCONTINUED | OUTPATIENT
Start: 2021-03-31 | End: 2021-03-31 | Stop reason: SDUPTHER

## 2021-03-31 RX ORDER — LABETALOL HYDROCHLORIDE 5 MG/ML
10 INJECTION, SOLUTION INTRAVENOUS
Status: COMPLETED | OUTPATIENT
Start: 2021-03-31 | End: 2021-03-31

## 2021-03-31 RX ORDER — ENOXAPARIN SODIUM 100 MG/ML
40 INJECTION SUBCUTANEOUS EVERY 24 HOURS
Status: DISCONTINUED | OUTPATIENT
Start: 2021-04-01 | End: 2021-04-01 | Stop reason: HOSPADM

## 2021-03-31 RX ORDER — ALBUTEROL SULFATE 0.83 MG/ML
2.5 SOLUTION RESPIRATORY (INHALATION)
Status: DISCONTINUED | OUTPATIENT
Start: 2021-03-31 | End: 2021-04-01 | Stop reason: HOSPADM

## 2021-03-31 RX ORDER — ONDANSETRON 2 MG/ML
4 INJECTION INTRAMUSCULAR; INTRAVENOUS
Status: DISCONTINUED | OUTPATIENT
Start: 2021-03-31 | End: 2021-04-01 | Stop reason: HOSPADM

## 2021-03-31 RX ORDER — ASPIRIN 325 MG
325 TABLET ORAL DAILY
Status: DISCONTINUED | OUTPATIENT
Start: 2021-04-01 | End: 2021-04-01

## 2021-03-31 RX ORDER — LORATADINE 10 MG/1
10 TABLET ORAL DAILY
Status: DISCONTINUED | OUTPATIENT
Start: 2021-04-01 | End: 2021-04-01 | Stop reason: HOSPADM

## 2021-03-31 RX ORDER — CHLORPHENIRAMINE MALEATE 4 MG
4 TABLET ORAL
Status: DISCONTINUED | OUTPATIENT
Start: 2021-03-31 | End: 2021-03-31

## 2021-03-31 RX ORDER — TAMSULOSIN HYDROCHLORIDE 0.4 MG/1
0.4 CAPSULE ORAL DAILY
Status: DISCONTINUED | OUTPATIENT
Start: 2021-04-01 | End: 2021-04-01 | Stop reason: HOSPADM

## 2021-03-31 RX ORDER — SODIUM CHLORIDE 0.9 % (FLUSH) 0.9 %
10 SYRINGE (ML) INJECTION
Status: COMPLETED | OUTPATIENT
Start: 2021-03-31 | End: 2021-03-31

## 2021-03-31 RX ORDER — SODIUM CHLORIDE 0.9 % (FLUSH) 0.9 %
5-40 SYRINGE (ML) INJECTION AS NEEDED
Status: DISCONTINUED | OUTPATIENT
Start: 2021-03-31 | End: 2021-04-01 | Stop reason: HOSPADM

## 2021-03-31 RX ORDER — TRAMADOL HYDROCHLORIDE 50 MG/1
50 TABLET ORAL
Status: DISCONTINUED | OUTPATIENT
Start: 2021-03-31 | End: 2021-04-01 | Stop reason: HOSPADM

## 2021-03-31 RX ORDER — BUDESONIDE AND FORMOTEROL FUMARATE DIHYDRATE 160; 4.5 UG/1; UG/1
2 AEROSOL RESPIRATORY (INHALATION) 2 TIMES DAILY
Status: DISCONTINUED | OUTPATIENT
Start: 2021-04-01 | End: 2021-04-01 | Stop reason: HOSPADM

## 2021-03-31 RX ORDER — FINASTERIDE 5 MG/1
5 TABLET, FILM COATED ORAL DAILY
Status: DISCONTINUED | OUTPATIENT
Start: 2021-04-01 | End: 2021-04-01 | Stop reason: HOSPADM

## 2021-03-31 RX ORDER — BISACODYL 5 MG
5 TABLET, DELAYED RELEASE (ENTERIC COATED) ORAL DAILY PRN
Status: DISCONTINUED | OUTPATIENT
Start: 2021-03-31 | End: 2021-04-01 | Stop reason: HOSPADM

## 2021-03-31 RX ORDER — PANTOPRAZOLE SODIUM 40 MG/1
40 TABLET, DELAYED RELEASE ORAL
Status: DISCONTINUED | OUTPATIENT
Start: 2021-04-01 | End: 2021-04-01 | Stop reason: HOSPADM

## 2021-03-31 RX ORDER — ACETAMINOPHEN 325 MG/1
650 TABLET ORAL
Status: DISCONTINUED | OUTPATIENT
Start: 2021-03-31 | End: 2021-04-01 | Stop reason: HOSPADM

## 2021-03-31 RX ADMIN — IOPAMIDOL 100 ML: 755 INJECTION, SOLUTION INTRAVENOUS at 20:23

## 2021-03-31 RX ADMIN — SODIUM CHLORIDE 100 ML: 900 INJECTION, SOLUTION INTRAVENOUS at 20:23

## 2021-03-31 RX ADMIN — Medication 10 ML: at 20:23

## 2021-03-31 RX ADMIN — LABETALOL HYDROCHLORIDE 10 MG: 5 INJECTION INTRAVENOUS at 21:57

## 2021-03-31 RX ADMIN — Medication 10 ML: at 23:00

## 2021-03-31 NOTE — ED TRIAGE NOTES
Pt arrives via EMS from home. Complains of right sided facial droop slurred speech beginning at 1750.

## 2021-03-31 NOTE — ED PROVIDER NOTES
Chief complaint : stroke like symptoms  RACE=2 for ems    HISTORY OF PRESENT ILLNESS :  Location : right sided facial droop, speech and balance, and blurry vision    Quality : could not walk correctly     Quantity : constant    Timing : 17:50    Severity : moderate    Context : was seated at home working on computer    Alleviating / exacerbating factors : n/a    Associated Symptoms : headache      The history is provided by the patient and the EMS personnel. Extremity Weakness   This is a new problem. The current episode started less than 1 hour ago. The problem occurs constantly. The problem has been gradually improving. Pertinent negatives include no back pain. He has tried nothing for the symptoms. There has been no history of extremity trauma.         Past Medical History:   Diagnosis Date    Acute pancreatitis 8/17/2015    Allergic rhinitis 12/6/2015    Arthritis     OA    Backache, unspecified 8/17/2015    Benign neoplasm of other specified sites of skin 8/17/2015    Chronic sinusitis 8/17/2015    colitis 08/17/2015    COLON POLYPS 08/17/2015    hx    Depressive disorder, not elsewhere classified 8/17/2015    Deviated nasal septum 9/20/2016    Family history of sudden cardiac death     GERD (gastroesophageal reflux disease)     controlled w/med    Hypertrophy of prostate with urinary obstruction and other lower urinary tract symptoms (LUTS) 08/17/2015    managed with medication, followed by PCP Dr. 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    HX APPENDECTOMY  1955    HX BACK SURGERY  1985    double laminectomy    HX CHOLECYSTECTOMY  2005    HX COLONOSCOPY      HX FRACTURE TX Right 1999    Rt hand, finger    HX FRACTURE TX Left 2004    broken arm/elbow         Family History:   Problem Relation Age of Onset    Other Other         Sudden Cardiac Death, colitis, back problems    GERD Other     Cancer Father        Social History     Socioeconomic History    Marital status:      Spouse name: Not on file    Number of children: Not on file    Years of education: Not on file    Highest education level: Not on file   Occupational History    Not on file   Social Needs    Financial resource strain: Not on file    Food insecurity     Worry: Not on file     Inability: Not on file    Transportation needs     Medical: Not on file     Non-medical: Not on file   Tobacco Use    Smoking status: Never Smoker    Smokeless tobacco: Never Used   Substance and Sexual Activity    Alcohol use:  Yes     Alcohol/week: 6.0 standard drinks     Types: 6 Standard drinks or equivalent per week     Comment: 6 drinks per week    Drug use: No    Sexual activity: Not on file   Lifestyle    Physical activity     Days per week: Not on file     Minutes per session: Not on file    Stress: Not on file   Relationships    Social connections     Talks on phone: Not on file     Gets together: Not on file     Attends Synagogue service: Not on file     Active member of club or organization: Not on file     Attends meetings of clubs or organizations: Not on file     Relationship status: Not on file    Intimate partner violence     Fear of current or ex partner: Not on file     Emotionally abused: Not on file     Physically abused: Not on file     Forced sexual activity: Not on file   Other Topics Concern    Not on file   Social History Narrative    Not on file ALLERGIES: Latex; No known drug allergies; Other plant, animal, environmental; and Pollen extracts    Review of Systems   Constitutional: Negative for chills and fever. HENT: Negative for rhinorrhea and sore throat. Eyes: Negative for discharge, redness and visual disturbance. Respiratory: Negative for cough and shortness of breath. Cardiovascular: Negative for chest pain and palpitations. Gastrointestinal: Negative for abdominal pain, diarrhea, nausea and vomiting. Musculoskeletal: Positive for gait problem. Negative for arthralgias and back pain. Skin: Negative for rash. Neurological: Positive for dizziness, facial asymmetry, speech difficulty and weakness. Negative for light-headedness and headaches. All other systems reviewed and are negative. There were no vitals filed for this visit. Physical Exam  Vitals signs and nursing note reviewed. Constitutional:       General: He is not in acute distress. Appearance: Normal appearance. He is well-developed. He is not ill-appearing, toxic-appearing or diaphoretic. HENT:      Head: Normocephalic and atraumatic. Right Ear: External ear normal.      Left Ear: External ear normal.      Mouth/Throat:      Mouth: Mucous membranes are moist.      Pharynx: Oropharynx is clear. No oropharyngeal exudate or posterior oropharyngeal erythema. Eyes:      General: No scleral icterus. Right eye: No discharge. Left eye: No discharge. Extraocular Movements: Extraocular movements intact. Conjunctiva/sclera: Conjunctivae normal.      Pupils: Pupils are equal, round, and reactive to light. Neck:      Musculoskeletal: Normal range of motion and neck supple. Normal range of motion. Thyroid: No thyromegaly. Trachea: Trachea normal.   Cardiovascular:      Rate and Rhythm: Normal rate and regular rhythm. Heart sounds: Normal heart sounds. No murmur. No gallop.     Pulmonary:      Effort: Pulmonary effort is normal. No respiratory distress. Breath sounds: Normal breath sounds. No wheezing or rales. Abdominal:      General: Bowel sounds are normal.      Palpations: Abdomen is soft. There is no hepatomegaly, splenomegaly or pulsatile mass. Tenderness: There is no abdominal tenderness. There is no guarding. Musculoskeletal: Normal range of motion. Lymphadenopathy:      Cervical: No cervical adenopathy. Skin:     General: Skin is warm and dry. Neurological:      General: No focal deficit present. Mental Status: He is alert and oriented to person, place, and time. Mental status is at baseline. Motor: No abnormal muscle tone. Comments: cni 2-12 minimal flattening, if any to right face, more noticible at rest, than when smiling    Speech clear, perhaps a little bit \"halting\"   Sensation intact throughout  No drift,  equal,   Finger to nose intact  hip flexors 5/5 against resistance, quads, planter- and dorsi- flexion are 5/5 bilaterally    9:08 PM recheck, pt feels speech is back to normal     Psychiatric:         Mood and Affect: Mood normal.         Behavior: Behavior normal.          MDM  Number of Diagnoses or Management Options  Stroke-like symptom: new and requires workup  Diagnosis management comments: Medical decision making note:  Sudden ataxia, dysarthria and facial droop  Ct ok  teleneurologist leans away from tpa,   cta ok  Admit to hospitliast  bp up will give 10 of labetotlol  This concludes the \"medical decision making note\" part of this emergency department visit note.          Amount and/or Complexity of Data Reviewed  Clinical lab tests: reviewed and ordered (Results Include:    Recent Results (from the past 24 hour(s))  -POC PT/INR  Collection Time: 03/31/21  7:02 PM       Result                      Value             Ref Range           Prothrombin time (POC)      12.6 (H)          9.6 - 11.6 S*       INR (POC)                   1.1               0.9 - 1.2 -GLUCOSE, POC  Collection Time: 03/31/21  7:02 PM       Result                      Value             Ref Range           Glucose (POC)               125 (H)           65 - 100 mg/*       Performed by                                                  Hillary  -CBC WITH AUTOMATED DIFF  Collection Time: 03/31/21  7:03 PM       Result                      Value             Ref Range           WBC                         11.9 (H)          4.3 - 11.1 K*       RBC                         4.77              4.23 - 5.6 M*       HGB                         16.6              13.6 - 17.2 *       HCT                         46.8              41.1 - 50.3 %       MCV                         98.1 (H)          79.6 - 97.8 *       MCH                         34.8 (H)          26.1 - 32.9 *       MCHC                        35.5 (H)          31.4 - 35.0 *       RDW                         11.9              11.9 - 14.6 %       PLATELET                    276               150 - 450 K/*       MPV                         10.5              9.4 - 12.3 FL       ABSOLUTE NRBC               0.00              0.0 - 0.2 K/*       DF                          AUTOMATED                             NEUTROPHILS                 65                43 - 78 %           LYMPHOCYTES                 20                13 - 44 %           MONOCYTES                   7                 4.0 - 12.0 %        EOSINOPHILS                 6                 0.5 - 7.8 %         BASOPHILS                   1                 0.0 - 2.0 %         IMMATURE GRANULOCYTES       0                 0.0 - 5.0 %         ABS. NEUTROPHILS            7.7               1.7 - 8.2 K/*       ABS. LYMPHOCYTES            2.4               0.5 - 4.6 K/*       ABS. MONOCYTES              0.9               0.1 - 1.3 K/*       ABS. EOSINOPHILS            0.7               0.0 - 0.8 K/*       ABS. BASOPHILS              0.1               0.0 - 0.2 K/*       ABS. IMM. GRANS. 0.1               0.0 - 0.5 K/*  -METABOLIC PANEL, BASIC  Collection Time: 03/31/21  7:03 PM       Result                      Value             Ref Range           Sodium                      137 (L)           138 - 145 mm*       Potassium                   6.2 (HH)          3.5 - 5.1 mm*       Chloride                    107               98 - 107 mmo*       CO2                         28                21 - 32 mmol*       Anion gap                   2 (L)             7 - 16 mmol/L       Glucose                     133 (H)           65 - 100 mg/*       BUN                         27 (H)            8 - 23 MG/DL        Creatinine                  1.03              0.8 - 1.5 MG*       GFR est AA                  >60               >60 ml/min/1*       GFR est non-AA              >60               >60 ml/min/1*       Calcium                     8.6               8.3 - 10.4 M*  -PROTHROMBIN TIME + INR  Collection Time: 03/31/21  7:03 PM       Result                      Value             Ref Range           Prothrombin time            14.1              12.5 - 14.7 *       INR                         1.1                              -TROPONIN-HIGH SENSITIVITY  Collection Time: 03/31/21  7:03 PM       Result                      Value             Ref Range           Troponin-High Sensitiv*     7.6               0 - 14 pg/mL   )  Tests in the radiology section of CPT®: ordered and reviewed (CT PERF W CBF   Final Result    No core infarct or significant mismatch. Please note that the determination of patient treatment is not based solely upon    imaging factors or calculation values. Management of ischemia is at the    discretion of the primary physician and is based upon a combination of clinical    and imaging data, along other factors.          CTA CODE NEURO HEAD AND NECK W CONT   ED Interpretation    No acute large vessel abnormality     CT CODE NEURO HEAD WO CONTRAST   Final Result    No CT evidence of acute intracranial abnormality. Paranasal sinus disease.             )  Decide to obtain previous medical records or to obtain history from someone other than the patient: yes  Obtain history from someone other than the patient: yes (wife)  Discuss the patient with other providers: yes (Teleneurology,  Hospitalist  )    Risk of Complications, Morbidity, and/or Mortality  Presenting problems: high  Diagnostic procedures: moderate  Management options: moderate  General comments: CRITICAL CARE Documentation: This patient is critically ill and there is a high probability of of imminent or life threatening deterioration in the patient's condition without immediate management. The nature of the patient's clinical problem is: acute stroke-like syppmtoms, nearly receiving tPA from tele-neurology    I have spent 35 minutes in direct patient care, documentation, review of labs/xrays/old records, discussion with Family, Colleague, Nursing . The time involved in the performance of separately reportable procedures was not counted toward critical care time.      Luberta Frankel, MD; 3/31/2021 @9:06 PM      Patient Progress  Patient progress: improved    ED Course as of Mar 31 1907   Wed Mar 31, 2021   1906 Seen on ems stretcher on [facility] at 16:57,   code \"s\" activated at 16:59    [JF]      ED Course User Index  [JF] Nicole Pacheco MD       EKG    Date/Time: 3/31/2021 9:01 PM  Performed by: Nicole Pacheco MD  Authorized by: Nicole Pacheco MD     ECG reviewed by ED Physician in the absence of a cardiologist: yes    Interpretation:     Interpretation: normal    Rate:     ECG rate:  56    ECG rate assessment: bradycardic    Rhythm:     Rhythm: sinus bradycardia    Ectopy:     Ectopy: none    QRS:     QRS axis:  Normal    QRS intervals:  Normal  Conduction:     Conduction: normal    ST segments:     ST segments:  Normal  T waves:     T waves: normal

## 2021-04-01 ENCOUNTER — APPOINTMENT (OUTPATIENT)
Dept: MRI IMAGING | Age: 76
End: 2021-04-01
Attending: FAMILY MEDICINE
Payer: MEDICARE

## 2021-04-01 VITALS
SYSTOLIC BLOOD PRESSURE: 158 MMHG | RESPIRATION RATE: 20 BRPM | DIASTOLIC BLOOD PRESSURE: 84 MMHG | WEIGHT: 235.45 LBS | HEART RATE: 55 BPM | BODY MASS INDEX: 33.71 KG/M2 | TEMPERATURE: 97.9 F | OXYGEN SATURATION: 94 % | HEIGHT: 70 IN

## 2021-04-01 PROBLEM — R47.81 SLURRED SPEECH: Status: RESOLVED | Noted: 2021-03-31 | Resolved: 2021-04-01

## 2021-04-01 PROBLEM — R47.81 SLURRED SPEECH: Status: ACTIVE | Noted: 2021-03-31

## 2021-04-01 LAB
ANION GAP SERPL CALC-SCNC: 6 MMOL/L (ref 7–16)
ATRIAL RATE: 56 BPM
BUN SERPL-MCNC: 23 MG/DL (ref 8–23)
CALCIUM SERPL-MCNC: 8.3 MG/DL (ref 8.3–10.4)
CALCULATED P AXIS, ECG09: 70 DEGREES
CALCULATED R AXIS, ECG10: 66 DEGREES
CALCULATED T AXIS, ECG11: 60 DEGREES
CHLORIDE SERPL-SCNC: 108 MMOL/L (ref 98–107)
CHOLEST SERPL-MCNC: 133 MG/DL
CO2 SERPL-SCNC: 27 MMOL/L (ref 21–32)
CREAT SERPL-MCNC: 0.92 MG/DL (ref 0.8–1.5)
DIAGNOSIS, 93000: NORMAL
ERYTHROCYTE [DISTWIDTH] IN BLOOD BY AUTOMATED COUNT: 11.9 % (ref 11.9–14.6)
EST. AVERAGE GLUCOSE BLD GHB EST-MCNC: 128 MG/DL
GLUCOSE BLD STRIP.AUTO-MCNC: 115 MG/DL (ref 65–100)
GLUCOSE SERPL-MCNC: 142 MG/DL (ref 65–100)
HBA1C MFR BLD: 6.1 % (ref 4.2–6.3)
HCT VFR BLD AUTO: 44.7 % (ref 41.1–50.3)
HDLC SERPL-MCNC: 52 MG/DL (ref 40–60)
HDLC SERPL: 2.6 {RATIO}
HGB BLD-MCNC: 15.5 G/DL (ref 13.6–17.2)
LDLC SERPL CALC-MCNC: 64 MG/DL
LIPID PROFILE,FLP: NORMAL
MAGNESIUM SERPL-MCNC: 2.4 MG/DL (ref 1.8–2.4)
MCH RBC QN AUTO: 34.9 PG (ref 26.1–32.9)
MCHC RBC AUTO-ENTMCNC: 34.7 G/DL (ref 31.4–35)
MCV RBC AUTO: 100.7 FL (ref 79.6–97.8)
NRBC # BLD: 0 K/UL (ref 0–0.2)
P-R INTERVAL, ECG05: 168 MS
PLATELET # BLD AUTO: 219 K/UL (ref 150–450)
PMV BLD AUTO: 10.1 FL (ref 9.4–12.3)
POTASSIUM SERPL-SCNC: 3.7 MMOL/L (ref 3.5–5.1)
Q-T INTERVAL, ECG07: 426 MS
QRS DURATION, ECG06: 88 MS
QTC CALCULATION (BEZET), ECG08: 411 MS
RBC # BLD AUTO: 4.44 M/UL (ref 4.23–5.6)
SERVICE CMNT-IMP: ABNORMAL
SODIUM SERPL-SCNC: 141 MMOL/L (ref 138–145)
T4 FREE SERPL-MCNC: 1 NG/DL (ref 0.78–1.46)
TRIGL SERPL-MCNC: 85 MG/DL (ref 35–150)
TSH SERPL DL<=0.005 MIU/L-ACNC: 2.82 UIU/ML (ref 0.36–3.74)
VENTRICULAR RATE, ECG03: 56 BPM
VLDLC SERPL CALC-MCNC: 17 MG/DL (ref 6–23)
WBC # BLD AUTO: 9.9 K/UL (ref 4.3–11.1)

## 2021-04-01 PROCEDURE — 99222 1ST HOSP IP/OBS MODERATE 55: CPT | Performed by: PSYCHIATRY & NEUROLOGY

## 2021-04-01 PROCEDURE — 74011250637 HC RX REV CODE- 250/637: Performed by: FAMILY MEDICINE

## 2021-04-01 PROCEDURE — 80061 LIPID PANEL: CPT

## 2021-04-01 PROCEDURE — 74011250637 HC RX REV CODE- 250/637: Performed by: INTERNAL MEDICINE

## 2021-04-01 PROCEDURE — 96372 THER/PROPH/DIAG INJ SC/IM: CPT

## 2021-04-01 PROCEDURE — APPNB30 APP NON BILLABLE TIME 0-30 MINS: Performed by: NURSE PRACTITIONER

## 2021-04-01 PROCEDURE — 83036 HEMOGLOBIN GLYCOSYLATED A1C: CPT

## 2021-04-01 PROCEDURE — 85027 COMPLETE CBC AUTOMATED: CPT

## 2021-04-01 PROCEDURE — 80048 BASIC METABOLIC PNL TOTAL CA: CPT

## 2021-04-01 PROCEDURE — 99218 HC RM OBSERVATION: CPT

## 2021-04-01 PROCEDURE — 36415 COLL VENOUS BLD VENIPUNCTURE: CPT

## 2021-04-01 PROCEDURE — 97116 GAIT TRAINING THERAPY: CPT

## 2021-04-01 PROCEDURE — 74011250636 HC RX REV CODE- 250/636: Performed by: FAMILY MEDICINE

## 2021-04-01 PROCEDURE — 92610 EVALUATE SWALLOWING FUNCTION: CPT

## 2021-04-01 PROCEDURE — 97161 PT EVAL LOW COMPLEX 20 MIN: CPT

## 2021-04-01 PROCEDURE — 84439 ASSAY OF FREE THYROXINE: CPT

## 2021-04-01 PROCEDURE — 83735 ASSAY OF MAGNESIUM: CPT

## 2021-04-01 PROCEDURE — 84443 ASSAY THYROID STIM HORMONE: CPT

## 2021-04-01 PROCEDURE — 70551 MRI BRAIN STEM W/O DYE: CPT

## 2021-04-01 PROCEDURE — 84481 FREE ASSAY (FT-3): CPT

## 2021-04-01 PROCEDURE — 97165 OT EVAL LOW COMPLEX 30 MIN: CPT

## 2021-04-01 PROCEDURE — 82962 GLUCOSE BLOOD TEST: CPT

## 2021-04-01 PROCEDURE — 97535 SELF CARE MNGMENT TRAINING: CPT

## 2021-04-01 RX ORDER — GUAIFENESIN 100 MG/5ML
81 LIQUID (ML) ORAL DAILY
Status: DISCONTINUED | OUTPATIENT
Start: 2021-04-01 | End: 2021-04-01 | Stop reason: HOSPADM

## 2021-04-01 RX ORDER — LISINOPRIL 5 MG/1
5 TABLET ORAL DAILY
Status: DISCONTINUED | OUTPATIENT
Start: 2021-04-01 | End: 2021-04-01

## 2021-04-01 RX ORDER — POTASSIUM CHLORIDE 20 MEQ/1
40 TABLET, EXTENDED RELEASE ORAL 2 TIMES DAILY
Status: DISCONTINUED | OUTPATIENT
Start: 2021-04-01 | End: 2021-04-01

## 2021-04-01 RX ADMIN — ASPIRIN 81 MG: 81 TABLET, CHEWABLE ORAL at 09:29

## 2021-04-01 RX ADMIN — PANTOPRAZOLE SODIUM 40 MG: 40 TABLET, DELAYED RELEASE ORAL at 05:54

## 2021-04-01 RX ADMIN — ENOXAPARIN SODIUM 40 MG: 40 INJECTION SUBCUTANEOUS at 08:04

## 2021-04-01 RX ADMIN — ASPIRIN 325 MG ORAL TABLET 325 MG: 325 PILL ORAL at 08:04

## 2021-04-01 RX ADMIN — LORATADINE 10 MG: 10 TABLET ORAL at 08:04

## 2021-04-01 RX ADMIN — TAMSULOSIN HYDROCHLORIDE 0.4 MG: 0.4 CAPSULE ORAL at 08:04

## 2021-04-01 RX ADMIN — FINASTERIDE 5 MG: 5 TABLET, FILM COATED ORAL at 08:04

## 2021-04-01 RX ADMIN — Medication 10 ML: at 05:55

## 2021-04-01 RX ADMIN — POTASSIUM CHLORIDE 40 MEQ: 20 TABLET, EXTENDED RELEASE ORAL at 08:04

## 2021-04-01 NOTE — PROGRESS NOTES
Problem: Falls - Risk of  Goal: *Absence of Falls  Description: Document Burrell Canavan Fall Risk and appropriate interventions in the flowsheet.   Outcome: Progressing Towards Goal  Note: Fall Risk Interventions:  Mobility Interventions: OT consult for ADLs, Patient to call before getting OOB, PT Consult for mobility concerns         Medication Interventions: Patient to call before getting OOB, Teach patient to arise slowly    Elimination Interventions: Patient to call for help with toileting needs, Toileting schedule/hourly rounds, Call light in reach              Problem: Patient Education: Go to Patient Education Activity  Goal: Patient/Family Education  Outcome: Progressing Towards Goal     Problem: Patient Education: Go to Patient Education Activity  Goal: Patient/Family Education  Outcome: Progressing Towards Goal     Problem: TIA/CVA Stroke: 0-24 hours  Goal: Off Pathway (Use only if patient is Off Pathway)  Outcome: Progressing Towards Goal  Goal: Activity/Safety  Outcome: Progressing Towards Goal  Goal: Consults, if ordered  Outcome: Progressing Towards Goal  Goal: Diagnostic Test/Procedures  Outcome: Progressing Towards Goal  Goal: Nutrition/Diet  Outcome: Progressing Towards Goal  Goal: Discharge Planning  Outcome: Progressing Towards Goal  Goal: Medications  Outcome: Progressing Towards Goal  Goal: Respiratory  Outcome: Progressing Towards Goal  Goal: Treatments/Interventions/Procedures  Outcome: Progressing Towards Goal  Goal: Minimize risk of bleeding post-thrombolytic infusion  Outcome: Progressing Towards Goal  Goal: Monitor for complications post-thrombolytic infusion  Outcome: Progressing Towards Goal  Goal: Psychosocial  Outcome: Progressing Towards Goal  Goal: *Hemodynamically stable  Outcome: Progressing Towards Goal  Goal: *Neurologically stable  Description: Absence of additional neurological deficits    Outcome: Progressing Towards Goal  Goal: *Verbalizes anxiety and depression are reduced or absent  Outcome: Progressing Towards Goal  Goal: *Absence of Signs of Aspiration on Current Diet  Outcome: Progressing Towards Goal  Goal: *Absence of deep venous thrombosis signs and symptoms(Stroke Metric)  Outcome: Progressing Towards Goal  Goal: *Ability to perform ADLs and demonstrates progressive mobility and function  Outcome: Progressing Towards Goal  Goal: *Stroke education started(Stroke Metric)  Outcome: Progressing Towards Goal  Goal: *Dysphagia screen performed(Stroke Metric)  Outcome: Progressing Towards Goal  Goal: *Rehab consulted(Stroke Metric)  Outcome: Progressing Towards Goal     Problem: TIA/CVA Stroke: Day 2 Until Discharge  Goal: Off Pathway (Use only if patient is Off Pathway)  Outcome: Progressing Towards Goal  Goal: Activity/Safety  Outcome: Progressing Towards Goal  Goal: Diagnostic Test/Procedures  Outcome: Progressing Towards Goal  Goal: Nutrition/Diet  Outcome: Progressing Towards Goal  Goal: Discharge Planning  Outcome: Progressing Towards Goal  Goal: Medications  Outcome: Progressing Towards Goal  Goal: Respiratory  Outcome: Progressing Towards Goal  Goal: Treatments/Interventions/Procedures  Outcome: Progressing Towards Goal  Goal: Psychosocial  Outcome: Progressing Towards Goal  Goal: *Verbalizes anxiety and depression are reduced or absent  Outcome: Progressing Towards Goal  Goal: *Absence of aspiration  Outcome: Progressing Towards Goal  Goal: *Absence of deep venous thrombosis signs and symptoms(Stroke Metric)  Outcome: Progressing Towards Goal  Goal: *Optimal pain control at patient's stated goal  Outcome: Progressing Towards Goal  Goal: *Tolerating diet  Outcome: Progressing Towards Goal  Goal: *Ability to perform ADLs and demonstrates progressive mobility and function  Outcome: Progressing Towards Goal  Goal: *Stroke education continued(Stroke Metric)  Outcome: Progressing Towards Goal     Problem: Ischemic Stroke: Discharge Outcomes  Goal: *Verbalizes anxiety and depression are reduced or absent  Outcome: Progressing Towards Goal  Goal: *Verbalize understanding of risk factor modification(Stroke Metric)  Outcome: Progressing Towards Goal  Goal: *Hemodynamically stable  Outcome: Progressing Towards Goal  Goal: *Absence of aspiration pneumonia  Outcome: Progressing Towards Goal  Goal: *Aware of needed dietary changes  Outcome: Progressing Towards Goal  Goal: *Verbalize understanding of prescribed medications including anti-coagulants, anti-lipid, and/or anti-platelets(Stroke Metric)  Outcome: Progressing Towards Goal  Goal: *Tolerating diet  Outcome: Progressing Towards Goal  Goal: *Aware of follow-up diagnostics related to anticoagulants  Outcome: Progressing Towards Goal  Goal: *Ability to perform ADLs and demonstrates progressive mobility and function  Outcome: Progressing Towards Goal  Goal: *Absence of DVT(Stroke Metric)  Outcome: Progressing Towards Goal  Goal: *Absence of aspiration  Outcome: Progressing Towards Goal  Goal: *Optimal pain control at patient's stated goal  Outcome: Progressing Towards Goal  Goal: *Home safety concerns addressed  Outcome: Progressing Towards Goal  Goal: *Describes available resources and support systems  Outcome: Progressing Towards Goal  Goal: *Verbalizes understanding of activation of EMS(911) for stroke symptoms(Stroke Metric)  Outcome: Progressing Towards Goal  Goal: *Understands and describes signs and symptoms to report to providers(Stroke Metric)  Outcome: Progressing Towards Goal  Goal: *Neurolgocially stable (absence of additional neurological deficits)  Outcome: Progressing Towards Goal  Goal: *Verbalizes importance of follow-up with primary care physician(Stroke Metric)  Outcome: Progressing Towards Goal  Goal: *Smoking cessation discussed,if applicable(Stroke Metric)  Outcome: Progressing Towards Goal  Goal: *Depression screening completed(Stroke Metric)  Outcome: Progressing Towards Goal

## 2021-04-01 NOTE — DISCHARGE SUMMARY
Hospitalist Discharge Summary     Admit Date:  3/31/2021  7:13 PM   DC note date: 2021  Name:  Corazon Garcia   Age:  66 y.o.  :  1945   MRN:  [id number]   PCP:  Rudy Brice MD  Treatment Team: Attending Provider: Angelita Posey MD; Consulting Provider: uEgene Zhao DO; Utilization Review: Lilly Alanis RN; Physical Therapist: Miko Stinson DPT; Occupational Therapist: Pooja Mendes OT  Presenting Complaint: Extremity Weakness      Problem List for this Hospitalization:  Hospital Problems as of 2021 Date Reviewed: 3/25/2021          Codes Class Noted - Resolved POA    SNHL (sensorineural hearing loss) - Asymmetrical (Chronic) ICD-10-CM: H90.5  ICD-9-CM: 389.10  2016 - Present Yes        Mixed hearing loss ICD-10-CM: H90.8  ICD-9-CM: 389.20  2016 - Present         Allergic rhinitis (Chronic) ICD-10-CM: J30.9  ICD-9-CM: 477.9  2015 - Present Yes        Benign prostatic hyperplasia with lower urinary tract symptoms (Chronic) ICD-10-CM: N40.1  ICD-9-CM: 600.01  2015 - Present Yes        * (Principal) RESOLVED: Slurred speech ICD-10-CM: R47.81  ICD-9-CM: 784.59  3/31/2021 - 2021 Yes                Admission HPI from 3/31/2021:    \" Patient is a 66 y.o. male with medical h/o BPH, allergic rhinitis, osteoarthritis, who presented to [facility] with reporting slurred speech and mild right facial droop. Symptoms started about 5:50 PM.  Symptoms were still present on arrival to the ER, but have improved during his stay in the emergency room. He feels he is significantly better and almost back to baseline. He had associated ataxia and reported some blurry vision as well. At the time of symptom onset he was working on his computer. He does report being around some cleaning chemicals that he wonders if that could have caused his symptoms. He also reports associated headache.   He denies fever/chills, NVD, chest pain, shortness of breath, abdominal pain. \"    Hospital Course:  Admitted for slurred speech. Resolved quickly. Workup unremarkable. I have low suspicion for TIA or CVA. Pt says he was cleaning his antique shotgun with chemicals that require ventilation and he did not have his ventilation fan on. May have been contributing. In any case, symptoms resolved. His LDL is excellent and again I do not suspect TIA, so did not start statin. He is already on ASA. Follow up with PCP instructed. Asked if he could check his BP at home or monitor it as outpatient also, and call PCP if SBP remains over 140. Disposition: Home or Self Care  Activity: Activity as tolerated  Diet: DIET CARDIAC Regular  Code Status: Full Code    Follow Up Orders:  No orders of the defined types were placed in this encounter. Follow-up Information     Follow up With Specialties Details Why Laurie Roth MD Internal Medicine Call follow up hospitalization. please check blood pressure to ensure it remains controlled [address] [place] [address]  [phone]            Plan was discussed with pt. All questions answered. Patient was stable at time of discharge. Given instructions to call a physician or return if any concerns. Discharge summary and encounter summary was sent to PCP electronically via \"Comm Mgt\" link in [facility], if possible.     Discharge Info:   Current Discharge Medication List      CONTINUE these medications which have NOT CHANGED    Details   ipratropium (ATROVENT) 42 mcg (0.06 %) nasal spray USE 2 SPRAYS IN EACH NOSTRIL FOUR TIMES DAILY  Qty: 15 mL, Refills: 0    Associated Diagnoses: Seasonal allergic rhinitis due to pollen      albuterol (PROVENTIL HFA, VENTOLIN HFA, PROAIR HFA) 90 mcg/actuation inhaler INHALE 2 PUFFS BY MOUTH EVERY 6 HOURS AS NEEDED FOR WHEEZING  Qty: 6.7 g, Refills: 5    Associated Diagnoses: Wheezing      cyclobenzaprine (FLEXERIL) 5 mg tablet Take 1 Tab by mouth three (3) times daily as needed for Muscle Spasm(s). Qty: 30 Tab, Refills: 3      diflorasone diacetate (PSORCON) 0.05 % ointment Apply  to affected area two (2) times daily as needed for Skin Irritation. Qty: 30 g, Refills: 5    Associated Diagnoses: Skin irritation      dicyclomine (BENTYL) 10 mg capsule dicyclomine 10 mg capsule      LORazepam (ATIVAN) 0.5 mg tablet Take 1 Tab by mouth daily. Max Daily Amount: 0.5 mg.  Qty: 20 Tab, Refills: 1    Comments: Prn for anxiety  Associated Diagnoses: Anxiety      OTHER,NON-FORMULARY, Mupirocin 2% CA CHASE #1320g and Dispense Ciprofloxacin 3mg/ml CA OS #600ml and dispense Budesonide 0.5mg//2ml CA Vial #120. Add 22g Mupirocin + 10ml CIprofloxacin + 2ml Budesonide + diluent to NRB & deliver into sinus cavity BID. Qty: 1 Each, Refills: 0      tamsulosin (FLOMAX) 0.4 mg capsule TAKE 1 CAPSULE DAILY  Qty: 90 Cap, Refills: 3    Associated Diagnoses: Benign non-nodular prostatic hyperplasia with lower urinary tract symptoms      fluticasone furoate-vilanteroL (Breo Ellipta) 100-25 mcg/dose inhaler Take 1 Puff by inhalation daily. Qty: 1 Inhaler, Refills: 0      ketoconazole (NIZORAL) 2 % topical cream Apply  to affected area two (2) times daily as needed for Skin Irritation.   Qty: 15 g, Refills: 3    Associated Diagnoses: Skin irritation      olsalazine (Dipentum) 250 mg capsule TAKE 2 CAPSULES DAILY  Qty: 180 Cap, Refills: 3    Associated Diagnoses: Ulcerative colitis without complications, unspecified location (HCC)      omeprazole (PRILOSEC) 20 mg capsule TAKE ONE CAPSULE BY MOUTH EVERY DAY FOR GASTROESOPHAGEAL REFLUX  Qty: 90 Cap, Refills: 3    Associated Diagnoses: Gastroesophageal reflux disease without esophagitis      celecoxib (CELEBREX) 200 mg capsule TAKE 1 CAPSULE DAILY FOR OSTEOARTHRITIS  Qty: 90 Cap, Refills: 3    Associated Diagnoses: Cervical arthritis      finasteride (PROSCAR) 5 mg tablet TAKE 1 TABLET BY MOUTH DAILY  Qty: 90 Tab, Refills: 5    Comments: **Patient requests 90 days supply**  Associated Diagnoses: Benign non-nodular prostatic hyperplasia with lower urinary tract symptoms      glucosamine/chondr hartmann A sod (OSTEO BI-FLEX PO) Take 1 Cap by mouth daily. thiamine HCL (VITAMIN B-1) 100 mg tablet Take  by mouth daily. VITAMIN B COMPLEX (B-100 PO) Take  by mouth daily. CHLORPHENIRAMINE MALEATE (CHLOR-TABLET PO) Take 1 Tab by mouth two (2) times daily as needed. traMADol (ULTRAM) 50 mg tablet Take 1 Tab by mouth every six (6) hours as needed for Pain. Max Daily Amount: 200 mg. Qty: 30 Tab, Refills: 3    Associated Diagnoses: Cervical arthritis      fluocinoLONE (SYNALAR) 0.025 % topical cream Apply  to affected area two (2) times a day. fluticasone (FLONASE) 50 mcg/actuation nasal spray USE 2 SPRAYS TO EACH NOSTRIL DAILY  Qty: 48 g, Refills: 4      loratadine (CLARITIN REDITABS) 10 mg dissolvable tablet Take 10 mg by mouth daily as needed. potassium 99 mg tablet Take 99 mg by mouth daily as needed. aspirin 81 mg chewable tablet Take 81 mg by mouth every other day. omega-3 fatty acids-vitamin e (FISH OIL) 1,000 mg cap Take 1 Cap by mouth daily. STOP taking these medications       methylPREDNISolone (MEDROL DOSEPACK) 4 mg tablet Comments:   Reason for Stopping:               Procedures done this admission:  * No surgery found *    Echocardiogram/EKG results:  No results found for this visit on 03/31/21. Results for orders placed or performed during the hospital encounter of 03/31/21   EKG, 12 LEAD, INITIAL   Result Value Ref Range    Ventricular Rate 56 BPM    Atrial Rate 56 BPM    P-R Interval 168 ms    QRS Duration 88 ms    Q-T Interval 426 ms    QTC Calculation (Bezet) 411 ms    Calculated P Axis 70 degrees    Calculated R Axis 66 degrees    Calculated T Axis 60 degrees    Diagnosis       !! AGE AND GENDER SPECIFIC ECG ANALYSIS !!   Sinus bradycardia  Otherwise normal ECG  No previous ECGs available  Confirmed by Ambrosio Diaz MD (), Nguyen Markham ([id number]) on 4/1/2021 7:50:22 AM     Results for orders placed or performed in visit on 09/30/19   AMB POC EKG ROUTINE W/ 12 LEADS, INTER & REP    Impression    EKG reviewed  Normal Sr  Rate normal  QRS normal  No ST elevation           Diagnostic Imaging/Tests:   Mri Brain Wo Cont    Result Date: 4/1/2021  MRI brain without contrast History: 67 y/o here for TIA. No hx of cancer. CT on PACS Imaging sequences: Sagittal short TR/short TE, axial short TR/short TE, long TR/long TE, FLAIR, gradient recall, diffusion weighted images and ADC mapping. Coronal FLAIR. Imaging was performed on a 1.5 Lou magnet. Comparison: None. Correlation was made to the CT scan of the brain and CT perfusion study performed one day earlier. Findings: The ventricles are normal in size and configuration. There are no extra-axial fluid collections. Normal flow voids are present within all of the major intracranial vessels. No evidence of intraparenchymal hemorrhage or mass effect is identified. There are no areas of restricted diffusion to suggest an acute or subacute infarction. Patchy and discrete foci of T2 prolongation are present within the supratentorial white matter. These are nonspecific findings but would be most compatible with mild to moderate chronic small vessel ischemic changes. Patchy T2 prolongation within the central freda is felt to represent part of the same process. Innumerable prominent perivascular spaces are present within the bilateral basal ganglia. There is a moderate left mastoid effusion. There is moderate opacification of bilateral ethmoid air cells and maxillary sinuses. There is mild mucosal thickening of the sphenoid sinuses. 1. No evidence of acute infarction. 2. Findings most compatible with mild to moderate chronic small vessel ischemic change. 3. Left mastoid effusion and paranasal sinus disease.      Ct Perf W Cbf    Result Date: 3/31/2021  CT Perfusion Imaging INDICATION:  Acute stroke evaluation with slurred speech, difficulty walking, right lower extremity weakness. CT perfusion imaging of the brain was performed after the administration of 100 mL Isovue-370 intravenous contrast.  Perfusion maps and perfusion analysis output were generated using the RAPID perfusion processing software algorithm. Radiation dose reduction techniques were used for this study: All CT scans performed at this facility use one or all of the following: Automated exposure control, adjustment of the mA and/or kVp according to patient's size, iterative reconstruction. FINDINGS: Areas of hypoperfusion in the posterior fossa and left parieto-occipital lobes are of unclear significance. RAPID Output Values: CBF < 30% volume:  0 ml   (core infarction volume greater than 50 cc associated with poor outcomes) Tmax > 6 seconds: 2 ml Tmax/CBF Mismatch Volume: 2 ml Tmax/CBF Mismatch Ratio: NA Hypoperfusion Intensity Ratio: 0 (values greater than 0.5 associated with poor outcome) Tmax > 10 seconds Volume: 0 ml (volume greater than 100 mL is associated with poor outcome)     No core infarct or significant mismatch. Please note that the determination of patient treatment is not based solely upon imaging factors or calculation values. Management of ischemia is at the discretion of the primary physician and is based upon a combination of clinical and imaging data, along other factors. Cta Code Neuro Head And Neck W Cont    Result Date: 4/1/2021  Title:  CT arteriogram of the neck and head. Indication: Transient ischemic attack (TIA). . Technique: Axial images of the neck and head were obtained after the uneventful administration of intravenous iodinated contrast media. Contrast was used to best identify the arterial structures.   Images were reviewed on a separate, free standing, three-dimensional workstation as per the referring physicians request.  All stenosis percentages derived by comparing the narrowest segment with the distal Internal Carotid Artery luminal diameter, as described in the [place] [employer] Symptomatic Carotid Endarterectomy Trial (NASCET) criteria. All CT scans at this facility are performed using dose reduction/dose modulation techniques, as appropriate the performed exam, including the following: Automated Exposure Control; Adjustment of the mA and/or kV according to patient size (this includes techniques or standardized protocols for targeted exams where dose is matched to indication/reason for exam); and Use of Iterative Reconstruction Technique. The study was analyzed by the [address] ai algorithm. Comparison: None. Findings: Lungs:  No focal consolidation or pleural effusions. No suspicious pulmonary nodules. .  Bones:  No osseous destruction. Moderate multilevel degenerative changes. This is most focal at the 4-5 and C5-6 where there is degenerative disc disease. Multilevel facet arthropathy is also present. Paranasal sinuses:  Opacification of the bilateral maxillary sinuses and ethmoid air cells. .  Brain:  No midline shift. No enhancing mass lesion or hydrocephalus. .  Soft tissues:  Within normal limits. .  Dural venous sinuses:  Patent. Aortic arch:  Non-aneurysmal. Arch vessels are patent. Moderate tortuosity of the arch vessels. Mild atherosclerotic changes. ANTERIOR CIRCULATION Right common carotid artery:  No significant stenosis or occlusion. Right internal carotid artery:  No significant stenosis or occlusion. Atherosclerotic changes are present along the carotid siphon without significant narrowing. Right middle cerebral artery:  No significant stenosis, occlusion, or aneurysm. Right anterior cerebral artery:  No significant stenosis, occlusion, or aneurysm. Left common carotid artery: No significant stenosis or occlusion. Left internal carotid artery:  Mild, approximately 30%, stenosis in the proximal left internal carotid artery secondary to atherosclerosis.  Atherosclerotic changes are present along the carotid siphon without significant narrowing. Left middle cerebral artery:  No significant stenosis, occlusion, or aneurysm. Left anterior cerebral artery:  No significant stenosis, occlusion, or aneurysm. Anterior communicating artery: No significant stenosis, occlusion, or aneurysm. POSTERIOR CIRCULATION Right vertebral artery:  No significant stenosis or occlusion. Left vertebral artery:  No significant stenosis or occlusion. Basilar artery:  No significant stenosis, occlusion, or aneurysm. Right posterior communicating artery: No significant stenosis, occlusion, or aneurysm. Left posterior communicating artery:  No significant stenosis, occlusion, or aneurysm. Right posterior cerebral artery: Moderate to high-grade stenosis in the proximal right PCA. Fetal type right PCA. Left posterior cerebral artery: Moderate stenosis in the proximal left PCA. 1.  No acute arterial occlusion is identified. 2.  Bilateral posterior cerebral artery stenoses, right greater than left. 3.  Evidence of chronic sinusitis. Ct Code Neuro Head Wo Contrast    Result Date: 3/31/2021  Noncontrast head CT Clinical Indication: Acute code stroke evaluation with slurred speech, difficulty walking, right-sided extremity weakness. History of chronic sinusitis. Technique: Noncontrast axial images were obtained through the brain. All CT scans at this location are performed using dose modulation techniques as appropriate including the following: Automated exposure control, adjustment of the MA and/or kV according to patient's size, or use of iterative reconstruction technique. Reformatted coronal images obtained to further evaluate bones, soft tissues, extra-axial spaces. Comparison: August 14, 2020 CT of the sinuses Findings: There is no acute intracranial hemorrhage, hydrocephalus, intra-axial mass, or mass-effect.  There are mild scattered and confluent areas of low-attenuation involving the bilateral periventricular white matter, basal ganglia, and the brainstem which are not unusual for age, nonspecific, most often chronic microvascular ischemic change. The possibility of acute superimposed or developing small infarct cannot be excluded by CT if clinically suspected. There is no CT evidence of acute large artery territorial infarction or abnormal extra-axial fluid collection. The mastoid air cells are aerated. Paranasal sinuses demonstrate mucosal thickening of the bilateral maxillary, ethmoid, sphenoid sinuses. There is increased density of the bilateral maxillary sinus contents with sclerotic surrounding wall thickening, in keeping with chronic sinusitis and possible fungal colonization. No displaced skull fractures are present. No CT evidence of acute intracranial abnormality. Paranasal sinus disease. All Micro Results     None          SARS-CoV-2 Lab Results  \"Novel Coronavirus\" Test: No results found for: COV2NT   \"Emergent Disease\" Test: No results found for: EDPR  \"SARS-COV-2\" Test: No results found for: XGCOVT  Rapid Test: No results found for: COVR         Labs: Results:       BMP, Mg, Phos Recent Labs     04/01/21 0417 03/31/21  2200 03/31/21  1903     --  137*   K 3.7 3.2* 6.2*   *  --  107   CO2 27  --  28   AGAP 6*  --  2*   BUN 23  --  27*   CREA 0.92  --  1.03   CA 8.3  --  8.6   *  --  133*   MG 2.4  --   --       CBC Recent Labs     04/01/21 0417 03/31/21  1903   WBC 9.9 11.9*   RBC 4.44 4.77   HGB 15.5 16.6   HCT 44.7 46.8    276   GRANS  --  65   LYMPH  --  20   EOS  --  6   MONOS  --  7   BASOS  --  1   IG  --  0   ANEU  --  7.7   ABL  --  2.4   OMERO  --  0.7   ABM  --  0.9   ABB  --  0.1   AIG  --  0.1      LFT No results for input(s): ALT, TBIL, AP, TP, ALB, GLOB, AGRAT in the last 72 hours.     No lab exists for component: SGOT, GPT   Cardiac Testing No results found for: BNPP, BNP, CPK, RCK1, RCK2, RCK3, RCK4, CKMB, CKNDX, CKND1, TROPT, TROIQ   Coagulation Tests Lab Results   Component Value Date/Time    Prothrombin time 14.1 03/31/2021 07:03 PM    INR 1.1 03/31/2021 07:03 PM    INR (POC) 1.1 03/31/2021 07:02 PM      A1c Lab Results   Component Value Date/Time    Hemoglobin A1c 6.1 04/01/2021 04:16 AM      Lipid Panel Lab Results   Component Value Date/Time    Cholesterol, total 133 04/01/2021 04:17 AM    HDL Cholesterol 52 04/01/2021 04:17 AM    LDL, calculated 64 04/01/2021 04:17 AM    VLDL, calculated 17 04/01/2021 04:17 AM    Triglyceride 85 04/01/2021 04:17 AM    CHOL/HDL Ratio 2.6 04/01/2021 04:17 AM      Thyroid Panel Lab Results   Component Value Date/Time    TSH 2.820 04/01/2021 04:17 AM    TSH 4.220 08/31/2017 07:45 AM    T4, Free 1.0 04/01/2021 04:17 AM    T4, Free 1.11 08/31/2017 07:45 AM        Most Recent UA No results found for: COLOR, APPRN, REFSG, KAITLIN, PROTU, GLUCU, KETU, BILU, BLDU, UROU, BEA, LEUKU, WBCU, RBCU, UEPI, BACTU, CASTS, UCRY, MUCUS, UCOM       All Labs from Last 24 Hrs:  Recent Results (from the past 24 hour(s))   POC PT/INR    Collection Time: 03/31/21  7:02 PM   Result Value Ref Range    Prothrombin time (POC) 12.6 (H) 9.6 - 11.6 SECS    INR (POC) 1.1 0.9 - 1.2     GLUCOSE, POC    Collection Time: 03/31/21  7:02 PM   Result Value Ref Range    Glucose (POC) 125 (H) 65 - 100 mg/dL    Performed by Hillary    CBC WITH AUTOMATED DIFF    Collection Time: 03/31/21  7:03 PM   Result Value Ref Range    WBC 11.9 (H) 4.3 - 11.1 K/uL    RBC 4.77 4.23 - 5.6 M/uL    HGB 16.6 13.6 - 17.2 g/dL    HCT 46.8 41.1 - 50.3 %    MCV 98.1 (H) 79.6 - 97.8 FL    MCH 34.8 (H) 26.1 - 32.9 PG    MCHC 35.5 (H) 31.4 - 35.0 g/dL    RDW 11.9 11.9 - 14.6 %    PLATELET 607 497 - 229 K/uL    MPV 10.5 9.4 - 12.3 FL    ABSOLUTE NRBC 0.00 0.0 - 0.2 K/uL    DF AUTOMATED      NEUTROPHILS 65 43 - 78 %    LYMPHOCYTES 20 13 - 44 %    MONOCYTES 7 4.0 - 12.0 %    EOSINOPHILS 6 0.5 - 7.8 %    BASOPHILS 1 0.0 - 2.0 %    IMMATURE GRANULOCYTES 0 0.0 - 5.0 %    ABS. NEUTROPHILS 7.7 1.7 - 8.2 K/UL    ABS. LYMPHOCYTES 2.4 0.5 - 4.6 K/UL    ABS. MONOCYTES 0.9 0.1 - 1.3 K/UL    ABS. EOSINOPHILS 0.7 0.0 - 0.8 K/UL    ABS. BASOPHILS 0.1 0.0 - 0.2 K/UL    ABS. IMM. GRANS. 0.1 0.0 - 0.5 K/UL   METABOLIC PANEL, BASIC    Collection Time: 03/31/21  7:03 PM   Result Value Ref Range    Sodium 137 (L) 138 - 145 mmol/L    Potassium 6.2 (HH) 3.5 - 5.1 mmol/L    Chloride 107 98 - 107 mmol/L    CO2 28 21 - 32 mmol/L    Anion gap 2 (L) 7 - 16 mmol/L    Glucose 133 (H) 65 - 100 mg/dL    BUN 27 (H) 8 - 23 MG/DL    Creatinine 1.03 0.8 - 1.5 MG/DL    GFR est AA >60 >60 ml/min/1.73m2    GFR est non-AA >60 >60 ml/min/1.73m2    Calcium 8.6 8.3 - 10.4 MG/DL   PROTHROMBIN TIME + INR    Collection Time: 03/31/21  7:03 PM   Result Value Ref Range    Prothrombin time 14.1 12.5 - 14.7 sec    INR 1.1     TROPONIN-HIGH SENSITIVITY    Collection Time: 03/31/21  7:03 PM   Result Value Ref Range    Troponin-High Sensitivity 7.6 0 - 14 pg/mL   EKG, 12 LEAD, INITIAL    Collection Time: 03/31/21  9:01 PM   Result Value Ref Range    Ventricular Rate 56 BPM    Atrial Rate 56 BPM    P-R Interval 168 ms    QRS Duration 88 ms    Q-T Interval 426 ms    QTC Calculation (Bezet) 411 ms    Calculated P Axis 70 degrees    Calculated R Axis 66 degrees    Calculated T Axis 60 degrees    Diagnosis       !! AGE AND GENDER SPECIFIC ECG ANALYSIS !!   Sinus bradycardia  Otherwise normal ECG  No previous ECGs available  Confirmed by [facility]  MD (), Kina TIERNEY ([id number]) on 4/1/2021 7:50:22 AM     POTASSIUM    Collection Time: 03/31/21 10:00 PM   Result Value Ref Range    Potassium 3.2 (L) 3.5 - 5.1 mmol/L   HEMOGLOBIN A1C WITH EAG    Collection Time: 04/01/21  4:16 AM   Result Value Ref Range    Hemoglobin A1c 6.1 4.20 - 6.30 %    Est. average glucose 128 mg/dL   CBC W/O DIFF    Collection Time: 04/01/21  4:17 AM   Result Value Ref Range    WBC 9.9 4.3 - 11.1 K/uL    RBC 4.44 4.23 - 5.6 M/uL    HGB 15.5 13.6 - 17.2 g/dL    HCT 44.7 41.1 - 50.3 %    .7 (H) 79.6 - 97.8 FL MCH 34.9 (H) 26.1 - 32.9 PG    MCHC 34.7 31.4 - 35.0 g/dL    RDW 11.9 11.9 - 14.6 %    PLATELET 538 061 - 641 K/uL    MPV 10.1 9.4 - 12.3 FL    ABSOLUTE NRBC 0.00 0.0 - 0.2 K/uL   LIPID PANEL    Collection Time: 04/01/21  4:17 AM   Result Value Ref Range    LIPID PROFILE          Cholesterol, total 133 <200 MG/DL    Triglyceride 85 35 - 150 MG/DL    HDL Cholesterol 52 40 - 60 MG/DL    LDL, calculated 64 <100 MG/DL    VLDL, calculated 17 6.0 - 23.0 MG/DL    CHOL/HDL Ratio 2.6     TSH 3RD GENERATION    Collection Time: 04/01/21  4:17 AM   Result Value Ref Range    TSH 2.820 0.358 - 3.740 uIU/mL   T4, FREE    Collection Time: 04/01/21  4:17 AM   Result Value Ref Range    T4, Free 1.0 0.78 - 9.59 NG/DL   METABOLIC PANEL, BASIC    Collection Time: 04/01/21  4:17 AM   Result Value Ref Range    Sodium 141 138 - 145 mmol/L    Potassium 3.7 3.5 - 5.1 mmol/L    Chloride 108 (H) 98 - 107 mmol/L    CO2 27 21 - 32 mmol/L    Anion gap 6 (L) 7 - 16 mmol/L    Glucose 142 (H) 65 - 100 mg/dL    BUN 23 8 - 23 MG/DL    Creatinine 0.92 0.8 - 1.5 MG/DL    GFR est AA >60 >60 ml/min/1.73m2    GFR est non-AA >60 >60 ml/min/1.73m2    Calcium 8.3 8.3 - 10.4 MG/DL   MAGNESIUM    Collection Time: 04/01/21  4:17 AM   Result Value Ref Range    Magnesium 2.4 1.8 - 2.4 mg/dL   GLUCOSE, POC    Collection Time: 04/01/21  6:32 AM   Result Value Ref Range    Glucose (POC) 115 (H) 65 - 100 mg/dL    Performed by [facility]        Discharge Exam:  Patient Vitals for the past 24 hrs:   Temp Pulse Resp BP SpO2   04/01/21 1200 97.9 °F (36.6 °C) (!) 55 20 (!) 158/84 94 %   04/01/21 0800 97.7 °F (36.5 °C) (!) 51 20 138/78 95 %   04/01/21 0400 97.9 °F (36.6 °C) (!) 47 18 133/71 94 %   04/01/21 0000 97.9 °F (36.6 °C) (!) 50 18 119/62 91 %   03/31/21 2218 97.7 °F (36.5 °C) (!) 50 18 (!) 157/76 95 %   03/31/21 2157  (!) 55  (!) 178/92    03/31/21 2156  (!) 52  (!) 178/92 97 %   03/31/21 2038  (!) 55  (!) 164/70 97 %   03/31/21 2000  60 11 (!) 177/82 97 % 03/31/21 1931 98.9 °F (37.2 °C) 62 18 (!) 178/86 96 %     Oxygen Therapy  O2 Sat (%): 94 % (04/01/21 1200)  Pulse via Oximetry: 53 beats per minute (03/31/21 2156)    Estimated body mass index is 33.78 kg/m² as calculated from the following:    Height as of this encounter: 5' 10\" (1.778 m). Weight as of this encounter: 106.8 kg (235 lb 7.2 oz). Intake/Output Summary (Last 24 hours) at 4/1/2021 1350  Last data filed at 4/1/2021 0516  Gross per 24 hour   Intake    Output 400 ml   Net -400 ml       *Note that automatically entered I/Os may not be accurate; dependent on patient compliance with collection and accurate  by assistants. General:    Well nourished. No overt distress  Eyes:   Normal sclerae. Extraocular movements intact. ENT:  Normocephalic, atraumatic. Moist mucous membranes  CV:   Regular rate and rhythm. No edema. Lungs:  Even, Unlabored  Abdomen: nondistended. Extremities: Warm and dry. No cyanosis or clubbing  Neurologic: CN II-XII grossly intact. No gross focal deficits. Alert. Skin:     No rashes. No jaundice. Psych:  Normal mood and affect.     Current Med List in Hospital:   Current Facility-Administered Medications   Medication Dose Route Frequency    aspirin chewable tablet 81 mg  81 mg Oral DAILY    albuterol (PROVENTIL VENTOLIN) nebulizer solution 2.5 mg  2.5 mg Nebulization Q6H PRN    finasteride (PROSCAR) tablet 5 mg  5 mg Oral DAILY    budesonide-formoteroL (SYMBICORT) 160-4.5 mcg/actuation HFA inhaler 2 Puff  2 Puff Inhalation BID    loratadine (CLARITIN) tablet 10 mg  10 mg Oral DAILY    pantoprazole (PROTONIX) tablet 40 mg  40 mg Oral ACB    tamsulosin (FLOMAX) capsule 0.4 mg  0.4 mg Oral DAILY    traMADoL (ULTRAM) tablet 50 mg  50 mg Oral Q6H PRN    sodium chloride (NS) flush 5-40 mL  5-40 mL IntraVENous Q8H    sodium chloride (NS) flush 5-40 mL  5-40 mL IntraVENous PRN    ondansetron (ZOFRAN) injection 4 mg  4 mg IntraVENous Q6H PRN    acetaminophen (TYLENOL) tablet 650 mg  650 mg Oral Q4H PRN    bisacodyL (DULCOLAX) tablet 5 mg  5 mg Oral DAILY PRN    enoxaparin (LOVENOX) injection 40 mg  40 mg SubCUTAneous Q24H       Allergies   Allergen Reactions    Latex Other (comments)     \"it burns\"     No Known Drug Allergies Other (comments)    Other Plant, Animal, Environmental Other (comments)     DUST  POLLENS  MOLD SPORES      Pollen Extracts Other (comments)     DUST  POLLENS  MOLD SPORES     Immunization History   Administered Date(s) Administered    Td 03/28/2005       Time spent in patient discharge planning and coordination 35 minutes.     Signed:  Kelly Montiel MD

## 2021-04-01 NOTE — PROGRESS NOTES
TRANSFER - IN REPORT:    Verbal report received from [facility], RN(name) on Jose Amaya  being received from ER(unit) for routine progression of care      Report consisted of patients Situation, Background, Assessment and   Recommendations(SBAR). Information from the following report(s) SBAR, ED Summary, Intake/Output, MAR and Recent Results was reviewed with the receiving nurse. Opportunity for questions and clarification was provided. Assessment to be completed upon patients arrival to unit and care assumed. Phone report was taken for primary nurse Walter Thomas.

## 2021-04-01 NOTE — ED NOTES
TRANSFER - OUT REPORT:    Verbal report given to Beverley(name) on Key Ceron  being transferred to 7th floor(unit) for routine progression of care       Report consisted of patients Situation, Background, Assessment and   Recommendations(SBAR). Information from the following report(s) SBAR was reviewed with the receiving nurse. Lines:   Peripheral IV 03/31/21 Right Antecubital (Active)        Opportunity for questions and clarification was provided.       Patient transported with:   Registered Nurse

## 2021-04-01 NOTE — DISCHARGE INSTRUCTIONS
Stroke: After Your Visit     Your Care Instructions     Risk factors for stroke include being overweight, smoking, and sedentary lifestyle. This means that the blood flow to a part of your brain was blocked for some time, which damages the nerve cells in that part of the brain. The part of your body controlled by that part of your brain may not function properly now. The brain is an amazing organ that can heal itself to some degree. The stroke you had damaged part of your brain, but other parts of your brain may take over in some way for the damaged areas. You have already started this process. Going home may be hard for you and your family. The more you can try to do for yourself, the better. Remember to take each day one at a time. Follow-up care is a key part of your treatment and safety. Be sure to make and go to all appointments, and call your doctor if you are having problems. Its also a good idea to know your test results and keep a list of the medicines you take. How can you care for yourself at home? Enter a stroke rehabilitation (rehab) program, if your doctor recommends it. Physical, speech, and occupational therapies can help you manage bathing, dressing, eating, and other basics of daily living. Eat a heart-healthy diet that is low in cholesterol, saturated fat, and salt. Eat lots of fresh fruits and vegetables and foods high in fiber. Increase your activities slowly. Take short rest breaks when you get tired. Gradually increase the amount you walk. Start out by walking a little more than you did the day before. Do not drive until your doctor says it is okay. It is normal to feel sad or depressed after a stroke. If the blues last, talk to your doctor. If you are having problems with urine leakage, go to the bathroom at regular times, including when you first wake up and at bedtime. Also, limit fluids after dinner.   If you are constipated, drink plenty of fluids, enough so that your urine is light yellow or clear like water. If you have kidney, heart, or liver disease and have to limit fluids, talk with your doctor before you increase the amount of fluids you drink. Set up a regular time for using the toilet. If you continue to have constipation, your doctor may suggest using a bulking agent, such as Metamucil, or a stool softener, laxative, or enema. Medicines  Take your medicines exactly as prescribed. Call your doctor if you think you are having a problem with your medicine. You may be taking several medicines. ACE (angiotensin-converting enzyme) inhibitors, angiotensin II receptor blockers (ARBs), beta-blockers, diuretics (water pills), and calcium channel blockers control your blood pressure. Statins help lower cholesterol. Your doctor may also prescribe medicines for depression, pain, sleep problems, anxiety, or agitation. If your doctor has given you medicine that prevents blood clots, such as warfarin (Coumadin), aspirin combined with extended-release dipyridamole (Aggrenox), clopidogrel (Plavix), or aspirin to prevent another stroke, you should:  Tell your dentist, pharmacist, and other health professionals that you take these medicines. Watch for unusual bruising or bleeding, such as blood in your urine, red or black stools, or bleeding from your nose or gums. Get regular blood tests to check your clotting time if you are taking Coumadin. Wear medical alert jewelry that says you take blood thinners. You can buy this at most drugstores. Do not take any over-the-counter medicines or herbal products without talking to your doctor first.  If you take birth control pills or hormone replacement therapy, talk to your doctor about whether they are right for you. For family members and caregivers  Make the home safe. Set up a room so that your loved one does not have to climb stairs. Be sure the bathroom is on the same floor.  Move throw rugs and furniture that could cause falls, and make sure that the lighting is good. Put grab bars and seats in tubs and showers. Find out what your loved one can do and what he or she needs help with. Try not to do things for your loved one that your loved one can do on his or her own. Help him or her learn and practice new skills. Visit and talk with your loved one often. Try doing activities together that you both enjoy, such as playing cards or board games. Keep in touch with your loved one's friends as much as you can, and encourage them to visit. Take care of yourself. Do not try to do everything yourself. Ask other family members to help. Eat well, get enough rest, and take time to do things that you enjoy. Keep up with your own doctor visits, and make sure to take your medicines regularly. Get out of the house as much as you can. Join a local support group. Find out if you qualify for home health care visits to help with rehab or for adult day care. When should you call for help? Call 911 anytime you think you may need emergency care. For example, call if:  You have signs of another stroke. These may include:  Sudden numbness, paralysis, or weakness in your face, arm, or leg, especially on only one side of your body. New problems with walking or balance. Sudden vision changes. Drooling or slurred speech. New problems speaking or understanding simple statements, or you feel confused. A sudden, severe headache that is different from past headaches. Call 911 even if these symptoms go away in a few minutes. You cough up blood. You vomit blood or what looks like coffee grounds. You pass maroon or very bloody stools. Call your doctor now or seek immediate medical care if:  You have new bruises or blood spots under your skin. You have a nosebleed. Your gums bleed when you brush your teeth. You have blood in your urine. Your stools are black and tarlike or have streaks of blood.   You have vaginal bleeding when you are not having your period, or heavy period bleeding. You have new symptoms that may be related to your stroke, such as falls or trouble swallowing. Watch closely for changes in your health, and be sure to contact your doctor if you have any problems. Where can you learn more? Go to [place] [employer]    Enter C294  in the search box to learn more about \"Stroke: After Your Visit\". © 1415-5020 [employer]wise, [employer]. Care instructions adapted under license by [facility] just[employer]me [facility] (which disclaims liability or warranty for this information). This care instruction is for use with your licensed healthcare professional. If you have questions about a medical condition or this instruction, always ask your healthcare professional. [employer] any warranty or liability for your use of this information.

## 2021-04-01 NOTE — H&P
[facility]  History and Physical    Patient ID:  Andrea Villarreal  male  1945  [id number]    Admission Date: 3/31/2021  Chief Complaint: Slurred speech and right facial droop  Reason for Admission: TIA    ASSESSMENT & PLAN:    Active Hospital Problems    Diagnosis Date Noted    TIA (transient ischemic attack) 03/31/2021    Essential hypertension 03/31/2021    Mixed hearing loss 09/20/2016    SNHL (sensorineural hearing loss) - Asymmetrical 09/20/2016     Principal Problem:    TIA (transient ischemic attack) (3/31/2021)  Continue TIA work-up with MRI in the morning, echocardiogram, complete set of labs, PT/OT/ST evaluations    Active Problems:    SNHL (sensorineural hearing loss) - Asymmetrical (9/20/2016)        Essential hypertension (3/31/2021)  He does not take any scheduled medications. His blood pressure was significantly elevated in the ER. We will continue to monitor and add treatment as necessary. Disposition: admit to observation, remote telemetry  Diet: Regular  VTE ppx: Lovenox   GI ppx: Pepcid   CODE STATUS: Full    Surrogate decision-maker:     HISTORY OF PRESENT ILLNESS:  Patient was discussed with the ER provider prior to seeing the patient. Patient is a 66 y.o. male with medical h/o BPH, allergic rhinitis, osteoarthritis, who presented to [facility] ED with reporting slurred speech and mild right facial droop. Symptoms started about 5:50 PM.  Symptoms were still present on arrival to the ER, but have improved during his stay in the emergency room. He feels he is significantly better and almost back to baseline. He had associated ataxia and reported some blurry vision as well. At the time of symptom onset he was working on his computer. He does report being around some cleaning chemicals that he wonders if that could have caused his symptoms. He also reports associated headache.   He denies fever/chills, NVD, chest pain, shortness of breath, abdominal pain.    REVIEW OF SYSTEMS:  A 14 point review of systems was taken and pertinent positive and negative as per HPI. Allergies   Allergen Reactions    Latex Other (comments)     \"it burns\"     No Known Drug Allergies Other (comments)    Other Plant, Animal, Environmental Other (comments)     DUST  POLLENS  MOLD SPORES      Pollen Extracts Other (comments)     DUST  POLLENS  MOLD SPORES       Prior to Admission Medications   Prescriptions Last Dose Informant Patient Reported? Taking? CHLORPHENIRAMINE MALEATE (CHLOR-TABLET PO)   Yes No   Sig: Take 1 Tab by mouth two (2) times daily as needed. LORazepam (ATIVAN) 0.5 mg tablet   No No   Sig: Take 1 Tab by mouth daily. Max Daily Amount: 0.5 mg.   OTHER,NON-FORMULARY,   No No   Sig: Mupirocin 2% CA CHASE #1320g and Dispense Ciprofloxacin 3mg/ml CA OS #600ml and dispense Budesonide 0.5mg//2ml CA Vial #120. Add 22g Mupirocin + 10ml CIprofloxacin + 2ml Budesonide + diluent to NRB & deliver into sinus cavity BID. VITAMIN B COMPLEX (B-100 PO)   Yes No   Sig: Take  by mouth daily. albuterol (PROVENTIL HFA, VENTOLIN HFA, PROAIR HFA) 90 mcg/actuation inhaler   No No   Sig: INHALE 2 PUFFS BY MOUTH EVERY 6 HOURS AS NEEDED FOR WHEEZING   aspirin 81 mg chewable tablet   Yes No   Sig: Take 81 mg by mouth every other day. celecoxib (CELEBREX) 200 mg capsule   No No   Sig: TAKE 1 CAPSULE DAILY FOR OSTEOARTHRITIS   cyclobenzaprine (FLEXERIL) 5 mg tablet   No No   Sig: Take 1 Tab by mouth three (3) times daily as needed for Muscle Spasm(s). dicyclomine (BENTYL) 10 mg capsule   Yes No   Sig: dicyclomine 10 mg capsule   diflorasone diacetate (PSORCON) 0.05 % ointment   No No   Sig: Apply  to affected area two (2) times daily as needed for Skin Irritation. finasteride (PROSCAR) 5 mg tablet   No No   Sig: TAKE 1 TABLET BY MOUTH DAILY   fluocinoLONE (SYNALAR) 0.025 % topical cream   Yes No   Sig: Apply  to affected area two (2) times a day.    fluticasone (FLONASE) 50 mcg/actuation nasal spray   No No   Sig: USE 2 SPRAYS TO EACH NOSTRIL DAILY   Patient taking differently: USE 2 SPRAYS TO EACH NOSTRIL DAILY as needed   fluticasone furoate-vilanteroL (Breo Ellipta) 100-25 mcg/dose inhaler   No No   Sig: Take 1 Puff by inhalation daily. glucosamine/chondr hartmann A sod (OSTEO BI-FLEX PO)   Yes No   Sig: Take 1 Cap by mouth daily. ipratropium (ATROVENT) 42 mcg (0.06 %) nasal spray   No No   Si Sprays by Both Nostrils route four (4) times daily. ketoconazole (NIZORAL) 2 % topical cream   No No   Sig: Apply  to affected area two (2) times daily as needed for Skin Irritation. loratadine (CLARITIN REDITABS) 10 mg dissolvable tablet   Yes No   Sig: Take 10 mg by mouth daily as needed. methylPREDNISolone (MEDROL DOSEPACK) 4 mg tablet   No No   Sig: As instructed   olsalazine (Dipentum) 250 mg capsule   No No   Sig: TAKE 2 CAPSULES DAILY   omega-3 fatty acids-vitamin e (FISH OIL) 1,000 mg cap   Yes No   Sig: Take 1 Cap by mouth daily. omeprazole (PRILOSEC) 20 mg capsule   No No   Sig: TAKE ONE CAPSULE BY MOUTH EVERY DAY FOR GASTROESOPHAGEAL REFLUX   potassium 99 mg tablet   Yes No   Sig: Take 99 mg by mouth daily as needed. tamsulosin (FLOMAX) 0.4 mg capsule   No No   Sig: TAKE 1 CAPSULE DAILY   thiamine HCL (VITAMIN B-1) 100 mg tablet   Yes No   Sig: Take  by mouth daily. traMADol (ULTRAM) 50 mg tablet   No No   Sig: Take 1 Tab by mouth every six (6) hours as needed for Pain. Max Daily Amount: 200 mg.       Facility-Administered Medications: None       Past Medical History:   Diagnosis Date    Acute pancreatitis 2015    Allergic rhinitis 2015    Arthritis     OA    Backache, unspecified 2015    Benign neoplasm of other specified sites of skin 2015    Benign non-nodular prostatic hyperplasia with lower urinary tract symptoms 2015    Cervical arthritis 2017    Chronic sinusitis 2015    colitis 2015    COLON POLYPS 2015 hx    Depressive disorder, not elsewhere classified 8/17/2015    Deviated nasal septum 9/20/2016    Esophageal reflux 8/17/2015    Family history of sudden cardiac death     GERD (gastroesophageal reflux disease)     controlled w/med    Hypertrophy of prostate with urinary obstruction and other lower urinary tract symptoms (LUTS) 08/17/2015    managed with medication, followed by PCP Dr. 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    Peripheral neuropathy 8/17/2015    Primary osteoarthritis of knee 8/3/2016    Sinus problem     SNHL (sensorineural hearing loss) 9/20/2016    TIA (transient ischemic attack) 3/31/2021    Tinnitus 9/20/2016    Ulcerative colitis, unspecified 08/17/2015    Ulcerative colitis, unspecified     Unspecified hearing loss 08/17/2015    hearing aids bilat    Unspecified hemorrhoids 8/17/2015    Urinary frequency 8/17/2015    WEAKNESS 8/17/2015     Past Surgical History:   Procedure Laterality Date    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    HX APPENDECTOMY  1955    HX BACK SURGERY  1985    double laminectomy    HX CHOLECYSTECTOMY  2005    HX COLONOSCOPY      HX FRACTURE TX Right 1999    Rt hand, finger    HX FRACTURE TX Left 2004    broken arm/elbow       Social History     Tobacco Use    Smoking status: Never Smoker    Smokeless tobacco: Never Used   Substance Use Topics    Alcohol use:  Yes     Alcohol/week: 6.0 standard drinks     Types: 6 Standard drinks or equivalent per week     Comment: 6 drinks per week       FAMILY HISTORY:  Reviewed and non-contributory to admitting problem, unless otherwise stated in the HPI    Family History   Problem Relation Age of Onset    Other Other         Sudden Cardiac Death, colitis, back problems    GERD Other     Cancer Father              PHYSICAL EXAM:    Patient Vitals for the past 24 hrs:   Temp Pulse Resp BP SpO2   03/31/21 2038  (!) 55  (!) 164/70 97 %   03/31/21 2000  60 11 (!) 177/82 97 %   03/31/21 1931 98.9 °F (37.2 °C) 62 18 (!) 178/86 96 %     Visit Vitals  BP (!) 164/70   Pulse (!) 55   Temp 98.9 °F (37.2 °C)   Resp 11   Ht 5' 10\" (1.778 m)   Wt 106.8 kg (235 lb 7.2 oz)   SpO2 97%   BMI 33.78 kg/m²       General:    WD and WN, No apparent distress. Eyes:  PERRL; EOMI; sclera normal/non-icteric  HENT:  Normocephalic, without obvious abnormality; Oropharynx is clear with tacky mucous membranes   Resp:    Clear to auscultation bilaterally. Resp are even and unlabored  CVS/Heart: Regular rate and rhythm,  No LE edema    GI:    Soft, non-tender. Not distended. Bowel sounds normal.     Musc/SK: Muscle strength is appropriate for age/condition; No cyanosis.  No clubbing  Skin:  No obvious rash/lesions  Neurologic: CN II - XII are grossly intact - Eye exam as noted above; non focal  Psych: Alert and oriented x 4;  Judgement and insight are normal      Intake/Output last 3 shifts:      Labs:  CMP:   Lab Results   Component Value Date/Time     (L) 03/31/2021 07:03 PM    K 6.2 (HH) 03/31/2021 07:03 PM     03/31/2021 07:03 PM    CO2 28 03/31/2021 07:03 PM    AGAP 2 (L) 03/31/2021 07:03 PM     (H) 03/31/2021 07:03 PM    BUN 27 (H) 03/31/2021 07:03 PM    CREA 1.03 03/31/2021 07:03 PM    GFRAA >60 03/31/2021 07:03 PM    GFRNA >60 03/31/2021 07:03 PM    CA 8.6 03/31/2021 07:03 PM         CBC:    Lab Results   Component Value Date/Time    WBC 11.9 (H) 03/31/2021 07:03 PM    HGB 16.6 03/31/2021 07:03 PM    HCT 46.8 03/31/2021 07:03 PM     03/31/2021 07:03 PM       Lab Results   Component Value Date/Time    INR 1.1 03/31/2021 07:03 PM    INR (POC) 1.1 03/31/2021 07:02 PM    Prothrombin time 14.1 03/31/2021 07:03 PM       ABG:  No results found for: PH, PHI, PCO2, PCO2I, PO2, PO2I, HCO3, HCO3I, FIO2, FIO2I        No results found for: CPK, RCK1, RCK2, RCK3, RCK4, CKMB, CKNDX, CKND1, TROPT, TROIQ, BNPP, BNP    Imaging & Other Studies:  Ct Perf W Cbf    Result Date: 3/31/2021  CT Perfusion Imaging INDICATION:  Acute stroke evaluation with slurred speech, difficulty walking, right lower extremity weakness. CT perfusion imaging of the brain was performed after the administration of 100 mL Isovue-370 intravenous contrast.  Perfusion maps and perfusion analysis output were generated using the RAPID perfusion processing software algorithm. Radiation dose reduction techniques were used for this study: All CT scans performed at this facility use one or all of the following: Automated exposure control, adjustment of the mA and/or kVp according to patient's size, iterative reconstruction. FINDINGS: Areas of hypoperfusion in the posterior fossa and left parieto-occipital lobes are of unclear significance. RAPID Output Values: CBF < 30% volume:  0 ml   (core infarction volume greater than 50 cc associated with poor outcomes) Tmax > 6 seconds: 2 ml Tmax/CBF Mismatch Volume: 2 ml Tmax/CBF Mismatch Ratio: NA Hypoperfusion Intensity Ratio: 0 (values greater than 0.5 associated with poor outcome) Tmax > 10 seconds Volume: 0 ml (volume greater than 100 mL is associated with poor outcome)     No core infarct or significant mismatch. Please note that the determination of patient treatment is not based solely upon imaging factors or calculation values. Management of ischemia is at the discretion of the primary physician and is based upon a combination of clinical and imaging data, along other factors. Ct Code Neuro Head Wo Contrast    Result Date: 3/31/2021  Noncontrast head CT Clinical Indication: Acute code stroke evaluation with slurred speech, difficulty walking, right-sided extremity weakness. History of chronic sinusitis. Technique: Noncontrast axial images were obtained through the brain. All CT scans at this location are performed using dose modulation techniques as appropriate including the following: Automated exposure control, adjustment of the MA and/or kV according to patient's size, or use of iterative reconstruction technique. Reformatted coronal images obtained to further evaluate bones, soft tissues, extra-axial spaces. Comparison: August 14, 2020 CT of the sinuses Findings: There is no acute intracranial hemorrhage, hydrocephalus, intra-axial mass, or mass-effect. There are mild scattered and confluent areas of low-attenuation involving the bilateral periventricular white matter, basal ganglia, and the brainstem which are not unusual for age, nonspecific, most often chronic microvascular ischemic change. The possibility of acute superimposed or developing small infarct cannot be excluded by CT if clinically suspected. There is no CT evidence of acute large artery territorial infarction or abnormal extra-axial fluid collection. The mastoid air cells are aerated. Paranasal sinuses demonstrate mucosal thickening of the bilateral maxillary, ethmoid, sphenoid sinuses. There is increased density of the bilateral maxillary sinus contents with sclerotic surrounding wall thickening, in keeping with chronic sinusitis and possible fungal colonization. No displaced skull fractures are present. No CT evidence of acute intracranial abnormality. Paranasal sinus disease.       EKG Results     Procedure 720 Value Units Date/Time    Initial ECG [[id number]]     Order Status: Sent           Active Problems:  Patient Active Problem List    Diagnosis Date Noted    TIA (transient ischemic attack) 03/31/2021    Essential hypertension 03/31/2021    SNHL (sensorineural hearing loss) - Asymmetrical 09/20/2016    Mixed hearing loss 09/20/2016         Junior Snellen, MD  [facility]  3/31/2021 9:22 PM

## 2021-04-01 NOTE — PROGRESS NOTES
Pt is a 67 yo male admitted due to a TIA. Pt was medically cleared for dc to home today and left the hospital before CM could complete face to face assessment. Chart reviewed for dc needs. PT and ST kylahyuki complete with no follow up therapy recommended. No other dc needs or concerns identified or reported. Care Management Interventions  PCP Verified by CM: Yes  Last Visit to PCP: 03/25/21  Mode of Transport at Discharge:  Other (see comment)  Transition of Care Consult (CM Consult): Discharge Planning  Discharge Durable Medical Equipment: No  Physical Therapy Consult: Yes  Occupational Therapy Consult: Yes  Speech Therapy Consult: Yes  Current Support Network: Own Home, Lives with Spouse  Confirm Follow Up Transport: Self  Discharge Location  Discharge Placement: Home

## 2021-04-01 NOTE — PROGRESS NOTES
ACUTE OT GOALS:  (Developed with and agreed upon by patient and/or caregiver.)  1. Patient will complete total body dressing independently. 2. Patient will complete grooming ADL standing at sink independently. 3. Patient will complete functional transfers independently. Timeframe: 7 visits     ALL GOALS MET 4/1/21    OCCUPATIONAL THERAPY ASSESSMENT: Initial Assessment, Daily Note and Discharge OT Treatment Day # 1    Radha Bowling is a 66 y.o. male   PRIMARY DIAGNOSIS: Slurred speech  TIA (transient ischemic attack) [G45.9]       Reason for Referral:    ICD-10: Treatment Diagnosis: Generalized Muscle Weakness (M62.81)  Other lack of cordination (R27.8)  Difficulty in walking, Not elsewhere classified (R26.2)  O[place] / [place] / Product Type: Medicare /   ASSESSMENT:     REHAB RECOMMENDATIONS:   Recommendation to date pending progress:  Setting:   No further skilled therapy   Equipment:    None     PRIOR LEVEL OF FUNCTION:  (Prior to Hospitalization)  INITIAL/CURRENT LEVEL OF FUNCTION:  (Based on today's evaluation)   Bathing:   Independent  Dressing:   Independent  Feeding/Grooming:   Independent  Toileting:   Independent  Functional Mobility:   Independent Bathing:   Independent  Dressing:   Independent  Feeding/Grooming:   Independent  Toileting:   Independent  Functional Mobility:   Independent     ASSESSMENT:  Mr. Indira Albright is a 65 y/o male presents with TIA. Pt lives with wife and is the primary caregiver for his wife with Parkinsons. Pt has a walk in shower with grab bars and SC. Pt demonstrated independence with functional ADLs, mobility and transfers this eval. Pt educated on BE FAST stroke signs/symptoms and verbalized understanding. Pt is currently functioning at hisbaseline and does not need further skilled OT services. Rec no d/c needs at d/c. OT will sign off at this time.       SUBJECTIVE:   Mr. Indira Albright states, \"I'm all better now.\"    SOCIAL HISTORY/LIVING ENVIRONMENT: Pt lives with wife in a one level home and is the primary caregiver for his wife with Parkinsons. Pt has a walk in shower with grab bars and SC.        OBJECTIVE:     PAIN: VITAL SIGNS: LINES/DRAINS:   Pre Treatment: Pain Screen  Pain Scale 1: Numeric (0 - 10)  Pain Intensity 1: 0  Post Treatment: 0   none        GROSS EVALUATION:  BUE Within Functional Limits Abnormal/ Functional Abnormal/ Non-Functional (see comments) Not Tested Comments:   AROM [x] [] [] []    PROM [x] [] [] []    Strength [x] [] [] []    Balance [x] [] [] []    Posture [x] [] [] []    Sensation [x] [] [] []    Coordination [x] [] [] []    Tone [x] [] [] []    Edema [x] [] [] []    Activity Tolerance [x] [] [] []     [] [] [] []      COGNITION/  PERCEPTION: Intact Impaired   (see comments) Comments:   Orientation [x] []    Vision [x] []    Hearing [] [x] B hearing aides   Judgment/ Insight [x] [] Good safety awareness   Attention [x] []    Memory [x] []    Command Following [x] []    Emotional Regulation [x] []     [] []      ACTIVITIES OF DAILY LIVING: I Mod I S SBA CGA Min Mod Max Total NT Comments   BASIC ADLs:              Bathing/ Showering [] [] [] [] [] [] [] [] [] []    Toileting [] [] [] [] [] [] [] [] [] []    Dressing [x] [] [] [] [] [] [] [] [] []    Feeding [] [] [x] [] [] [] [] [] [] [] Set up EOB   Grooming [x] [] [] [] [] [] [] [] [] []    Personal Device Care [] [] [] [] [] [] [] [] [] []    Functional Mobility [x] [] [] [] [] [] [] [] [] []    I=Independent, Mod I=Modified Independent, S=Supervision, SBA=Standby Assistance, CGA=Contact Guard Assistance,   Min=Minimal Assistance, Mod=Moderate Assistance, Max=Maximal Assistance, Total=Total Assistance, NT=Not Tested    MOBILITY: I Mod I S SBA CGA Min Mod Max Total  NT x2 Comments:   Supine to sit [x] [] [] [] [] [] [] [] [] [] []    Sit to supine [x] [] [] [] [] [] [] [] [] [] []    Sit to stand [x] [] [] [] [] [] [] [] [] [] []    Bed to chair [x] [] [] [] [] [] [] [] [] [] []    I=Independent, Mod I=Modified Independent, S=Supervision, SBA=Standby Assistance, CGA=Contact Guard Assistance,   Min=Minimal Assistance, Mod=Moderate Assistance, Max=Maximal Assistance, Total=Total Assistance, NT=Not [place] [address]   Daily Activity Inpatient Short Form        How much help from another person does the patient currently need. .. Total A Lot A Little None   1. Putting on and taking off regular lower body clothing? [] 1   [] 2   [] 3   [x] 4   2. Bathing (including washing, rinsing, drying)? [] 1   [] 2   [] 3   [x] 4   3. Toileting, which includes using toilet, bedpan or urinal?   [] 1   [] 2   [] 3   [x] 4   4. Putting on and taking off regular upper body clothing? [] 1   [] 2   [] 3   [x] 4   5. Taking care of personal grooming such as brushing teeth? [] 1   [] 2   [] 3   [x] 4   6. Eating meals? [] 1   [] 2   [] 3   [x] 4   © 2007, Trustees of [address] [place] [address], under license to [employer]. All rights reserved     Score:  Initial: 24 Most Recent: X (Date: -- )   Interpretation of Tool:  Represents activities that are increasingly more difficult (i.e. Bed mobility, Transfers, Gait). PLAN:   FREQUENCY/DURATION: OT Plan of Care: (eval & dc) for duration of hospital stay or until stated goals are met, whichever comes first.    PROBLEM LIST:   (Skilled intervention is medically necessary to address:)    Completed 4/1/21  1. Decreased ADL/Functional Activities  2. Decreased Transfer Abilities   INTERVENTIONS PLANNED:   (Benefits and precautions of occupational therapy have been discussed with the patient.)    Completed 4/1/21  1. Self Care Training  2.  Education     TREATMENT:     EVALUATION: Low Complexity : (Untimed Charge)    TREATMENT:   (     )  Self Care (19 Minutes): Self care including Upper Body Dressing, Lower Body Dressing, Self Feeding, Grooming and ambulating household distances to increase independence and decrease level of assistance required.     TREATMENT GRID:  N/A    AFTER TREATMENT POSITION/PRECAUTIONS:  Bed, Needs within reach, RN notified and Visitors at bedside    INTERDISCIPLINARY COLLABORATION:  RN/PCT and OT/JOINER    TOTAL TREATMENT DURATION:  OT Patient Time In/Time Out  Time In: 1348  Time Out: [address], OT

## 2021-04-01 NOTE — PROGRESS NOTES
ACUTE PHYSICAL THERAPY GOALS:  (Developed with and agreed upon by patient and/or caregiver.)  1. Patient will perform bed mobility with INDEPENDENCE within 7 days. 2. Patient will transfer bed to chair with INDEPENDENT within 7 days. 3. Patient will demonstrate GOOD  DYNAMIC STANDING balance within 7 day(s). 4. Patient will ambulate 300ft with INDEPENDENCE within 7 days. PHYSICAL THERAPY ASSESSMENT: Initial Assessment, Daily Note and AM PT Treatment Day # 1      Faisal Us is a 66 y.o. male   PRIMARY DIAGNOSIS: Slurred speech  TIA (transient ischemic attack) [G45.9]       Reason for Referral:    ICD-10: Treatment Diagnosis: Difficulty in walking, Not elsewhere classified (R26.2)  [place] / Product Type: Medicare /     ASSESSMENT:     REHAB RECOMMENDATIONS:   Recommendation to date pending progress:  Setting:   No further skilled therapy   Equipment:    None     PRIOR LEVEL OF FUNCTION:  (Prior to Hospitalization) INITIAL/CURRENT LEVEL OF FUNCTION:  (Most Recently Demonstrated)   Bed Mobility:   Independent  Sit to Stand:   Independent  Transfers:   Independent  Gait/Mobility:   Independent Bed Mobility:   Independent  Sit to Stand:   Independent  Transfers:   Independent  Gait/Mobility:   Independent     ASSESSMENT:  Mr. Jenny Conroy is a pleasant 66year old male admitted with acute onset speech disturbance with TIA/CVA work up pending. Patient seen this AM for initial evaluation. At baseline, Mr. Jenny Conroy is an independent, community-level ambulator, drives, and is active. Today, presents with intact strength, AROM, coordination, balance, vision, and gait mechanics. Educated on activity pacing throughout transfers and gait training as well as BEFAST with teach back. No additional skilled PT services indicated at this time. SUBJECTIVE:   Mr. Jenny Conroy states, \"I feel much better today. \"    SOCIAL HISTORY/LIVING ENVIRONMENT: Lives with spouse in a single level residence with 6-7 steps   To enter. At baseline, patient is an independent, community-level ambulator. 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 Of note, patient's spouse has Parkinson's Disease and patient assists  With IADLs and supervision with ADL for spouse.       OBJECTIVE:     PAIN: VITAL SIGNS: LINES/DRAINS:   Pre Treatment: Pain Screen  Pain Scale 1: Numeric (0 - 10)  Pain Intensity 1: 0  Post Treatment: 0/10   IV        GROSS EVALUATION:   Within Functional Limits Abnormal/ Functional Abnormal/ Non-Functional (see comments) Not Tested Comments:   AROM [x] [] [] [] B UE/LE   PROM [] [] [] [x]    Strength [x] [] [] [] B UE/LE 5/5   Balance [x] [] [] [] Fair+ progressed to good dynamic balance   Posture [x] [] [] [] WNL   Sensation [] [x] [] [] Diminished L LE to light touch distally; patient's baseline from lumbar issue   Coordination [x] [] [] [] Reciprocal and functional B UE/LE   Tone [x] [] [] [] WNL   Edema [] [] [] [x]    Activity Tolerance [x] [] [] []     [] [] [] []      COGNITION/  PERCEPTION: Intact Impaired   (see comments) Comments:   Orientation [x] [] Oriented x4   Vision [x] [] Intact acuity, tracking, visual fields   Hearing [] [x] Self reported HOB; hears best out of right ear   Command Following [x] []    Safety Awareness [x] []     [] []      MOBILITY: I Mod I S SBA CGA Min Mod Max Total  NT x2 Comments:   Bed Mobility    Rolling [x] [] [] [] [] [] [] [] [] [] []    Supine to Sit [x] [] [] [] [] [] [] [] [] [] []    Scooting [x] [] [] [] [] [] [] [] [] [] []    Sit to Supine [] [] [] [] [] [] [] [] [] [x] []    Transfers    Sit to Stand [x] [] [] [] [] [] [] [] [] [] []    Bed to Chair [x] [] [] [] [] [] [] [] [] [] []    Stand to Sit [x] [] [] [] [] [] [] [] [] [] []    I=Independent, Mod I=Modified Independent, S=Supervision, SBA=Standby Assistance, CGA=Contact Guard Assistance,   Min=Minimal Assistance, Mod=Moderate Assistance, Max=Maximal Assistance, Total=Total Assistance, NT=Not Tested  GAIT: I Mod I S SBA CGA Min Mod Max Total  NT x2 Comments:   Level of Assistance [x] [] [x] [] [] [] [] [] [] [] [] Initially supervision; progressed to independent    Distance 325ft    DME None    Gait Quality Mildly decresed swingon right (patient's baseline secondary to knee issue per report) otherwise mechanics normal    Weightbearing Status N/A     I=Independent, Mod I=Modified Independent, S=Supervision, SBA=Standby Assistance, CGA=Contact Guard Assistance,   Min=Minimal Assistance, Mod=Moderate Assistance, Max=Maximal Assistance, Total=Total Assistance, NT=Not Tested    [place] Form       How much difficulty does the patient currently have. .. Unable A Lot A Little None   1. Turning over in bed (including adjusting bedclothes, sheets and blankets)? [] 1   [] 2   [] 3   [x] 4   2. Sitting down on and standing up from a chair with arms ( e.g., wheelchair, bedside commode, etc.)   [] 1   [] 2   [] 3   [x] 4   3. Moving from lying on back to sitting on the side of the bed? [] 1   [] 2   [] 3   [x] 4   How much help from another person does the patient currently need. .. Total A Lot A Little None   4. Moving to and from a bed to a chair (including a wheelchair)? [] 1   [] 2   [] 3   [x] 4   5. Need to walk in hospital room? [] 1   [] 2   [] 3   [x] 4   6. Climbing 3-5 steps with a railing? [] 1   [] 2   [] 3   [x] 4   © 2007, Trustees of [address] [place] [id number], under license to [employer]. All rights reserved     Score:  Initial: 24 Most Recent: X (Date: -- )    Interpretation of Tool:  Represents activities that are increasingly more difficult (i.e. Bed mobility, Transfers, Gait).     PLAN:   FREQUENCY/DURATION: PT Plan of Care: (Eval + D/C) for duration of hospital stay or until stated goals are met, whichever comes first.    PROBLEM LIST:   (Skilled intervention is medically necessary to address:)  1. Decreased Balance  2. Decreased Gait Ability   INTERVENTIONS PLANNED:   (Benefits and precautions of physical therapy have been discussed with the patient.)  1. Therapeutic Activity  2. Therapeutic Exercise/HEP  3. Neuromuscular Re-education  4. Gait Training  5. Manual Therapy  6. Education     TREATMENT:     EVALUATION: Low Complexity : (Untimed Charge)    TREATMENT:   (     )  Gait Training (10 Minutes): Gait training for 325 feet utilizing None. Patient required Verbal cueing to improve Activity Pacing.      TREATMENT GRID:  N/A    AFTER TREATMENT POSITION/PRECAUTIONS:  Bed, Needs within reach and RN notified    INTERDISCIPLINARY COLLABORATION:  MD/PA/NP, RN/PCT and PT/PTA    TOTAL TREATMENT DURATION:  PT Patient Time In/Time Out  Time In: 0079  Time Out: [address], DPT

## 2021-04-01 NOTE — PROGRESS NOTES
04/01/21 0705   NIH Stroke Scale   Interval Other (comment)  (Dual NIH with Bhakti Bound, RN)   LOC 0   LOC Questions 0   LOC Commands 0   Best Gaze 0   Visual 0   Facial Palsy 1   Motor Right Arm 0   Motor Left Arm 0   Motor Right Leg 0   Motor Left Leg 0   Limb Ataxia 0   Sensory 0   Best Language 0   Dysarthria 0   Extinction and Inattention 0   Total 1

## 2021-04-01 NOTE — PROGRESS NOTES
STG: Patient will participate in full cognitive linguistic evaluation if deemed appropriate pending imaging. OBSERVATION STATUS  SPEECH LANGUAGE PATHOLOGY: DYSPHAGIA- Initial Assessment    NAME/AGE/GENDER: Richy Colindres is a 66 y.o. male  DATE: 4/1/2021  PRIMARY DIAGNOSIS: TIA (transient ischemic attack) [G45.9]      ICD-10: Treatment Diagnosis: R13.12 Dysphagia, Oropharyngeal Phase    RECOMMENDATIONS   DIET:    Regular Consistency   Thin Liquids    MEDICATIONS: With liquid     ASPIRATION PRECAUTIONS  · Slow rate of intake  · Small bites/sips  · Upright at 90 degrees during meal     COMPENSATORY STRATEGIES/MODIFICATIONS  · None     RECOMMENDATIONS for CONTINUED SPEECH THERAPY:   YES: Anticipate need for ongoing speech therapy during this hospitalization. ASSESSMENT   Patient presents with oropharyngeal swallow function that is within normal limits. No s/sx of dysphagia identified. Recommend continue regular diet/thin liquids. meds with liquid rinse. No dysphagia treatment indicated. EDUCATION:  · Recommendations discussed with Patient  CONTINUATION OF SKILLED SERVICES/MEDICAL NECESSITY:   Patient continues to require skilled intervention due to pending CVA workup. REHABILITATION POTENTIAL FOR STATED GOALS: Excellent    PLAN    FREQUENCY/DURATION: Assessment of cognitive-linguistic abilities to be completed at next session if indicated by MRI. Frequency and duration to be determined by findings/recommendations. SUBJECTIVE   Upright in bed and conversant. \"I care for my wife with parkinson's.  i'm not supposed to get sick\"  \"I read this book and I think I had a TIA\"     Oxygen Device: room air  Pain: Pain Scale 1: Numeric (0 - 10)  Pain Intensity 1: 0    History of Present Injury/Illness: Mr. Romina Meneses  has a past medical history of Acute pancreatitis (8/17/2015), Allergic rhinitis (12/6/2015), Arthritis, Backache, unspecified (8/17/2015), Benign neoplasm of other specified sites of skin (8/17/2015), Benign non-nodular prostatic hyperplasia with lower urinary tract symptoms (8/17/2015), Cervical arthritis (5/30/2017), Chronic sinusitis (8/17/2015), colitis (08/17/2015), COLON POLYPS (08/17/2015), Depressive disorder, not elsewhere classified (8/17/2015), Deviated nasal septum (9/20/2016), Esophageal reflux (8/17/2015), Family history of sudden cardiac death, GERD (gastroesophageal reflux disease), Hypertrophy of prostate with urinary obstruction and other lower urinary tract symptoms (LUTS) (08/17/2015), 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 He also has no past medical history of Aneurysm ([address] Arrhythmia, Asthma, Autoimmune disease ([address] CAD (coronary artery disease), Chronic kidney disease, Chronic obstructive pulmonary disease ([address] Chronic pain, Coagulation disorder ([address] Diabetes ([address] Difficult intubation, Endocarditis, Heart failure ([address] Liver disease, Malignant hyperthermia due to anesthesia, Morbid obesity ([address] Nausea & vomiting, Nicotine vapor product user, Non-nicotine vapor product user, Pseudocholinesterase deficiency, Rheumatic fever, Seizures ([address] Sleep apnea, Thromboembolus ([address] or Thyroid disease. Richelle Archer  He also  has a past surgical history that includes hx appendectomy (1955); hx cholecystectomy (2005); hx fracture tx (Right, 1999); hx fracture tx (Left, 2004); hx amputation (Left, 2009); hx back surgery (1985); and hx colonoscopy. PRECAUTIONS/ALLERGIES: Latex; No known drug allergies; Other plant, animal, environmental; and Pollen extracts     Problem List:  (Impairments causing functional limitations):  1. Oropharyngeal dysphagia- No symptoms identified    Previous Dysphagia: YES esophageal dysphagia per chart. Barium swallow completed 8/12/2020 with findings of esophageal dysmotility and hiatal hernia. Diet Prior to Evaluation: regular/thin liquids. Orientation:  Person  Place  Time  Situation    Cognitive-Linguistic Screening:   Alertness  o Alert   Speech Production:   o Fully intelligible(reports slurred speech resolved last night)   Expressive Language:  o Fluent speech and Able to effectively communicate wants and needs at conversation level   Receptive Language:  o Answers yes/no questions appropriately and Follows commands   Cognition:   o Denies changes. Prior Level of Function: independent. Lives with his wife. Drives and manages meds. Retired  and now enjoys wood working. Recommendations: Given results of screening, patient appears to be functioning at baseline. However imaging results are still pending. Will follow up for further assessment as indicated by findings. OBJECTIVE   Oral Motor:   · Labial: Asymmetric at rest and right droop  · Dentition: Intact  · Oral Hygiene: Adequate  · Lingual: No impairment    Dysphagia evaluation:  Patient presented with thin liquid via cup and straw, mixed, and solid consistencies. Appropriate oral prep with all textures. Timely swallow initiation, and single swallows upon palpation. Adequate oral clearing. No overt signs or symptoms of airway compromise observed with liquid or solid textures.        Tool Used: Dysphagia Outcome and Severity Scale (LEE)    Score Comments   Normal Diet  [] 7 With no strategies or extra time needed   Functional Swallow  [] 6 May have mild oral or pharyngeal delay   Mild Dysphagia  [] 5 Which may require one diet consistency restricted    Mild-Moderate Dysphagia  [] 4 With 1-2 diet consistencies restricted   Moderate Dysphagia  [] 3 With 2 or more diet consistencies restricted   Moderate-Severe Dysphagia  [] 2 With partial PO strategies (trials with ST only)   Severe Dysphagia  [] 1 With inability to tolerate any PO safely      Score:  Initial: 7 Most Recent: 7 (Date 04/01/21 )   Interpretation of Tool: The Dysphagia Outcome and Severity Scale (LEE) is a simple, easy-to-use, 7-point scale developed to systematically rate the functional severity of dysphagia based on objective assessment and make recommendations for diet level, independence level, and type of nutrition. Current Medications:   No current facility-administered medications on file prior to encounter. Current Outpatient Medications on File Prior to Encounter   Medication Sig Dispense Refill    albuterol (PROVENTIL HFA, VENTOLIN HFA, PROAIR HFA) 90 mcg/actuation inhaler INHALE 2 PUFFS BY MOUTH EVERY 6 HOURS AS NEEDED FOR WHEEZING 6.7 g 5    methylPREDNISolone (MEDROL DOSEPACK) 4 mg tablet As instructed 1 Dose Pack 0    [DISCONTINUED] ipratropium (ATROVENT) 42 mcg (0.06 %) nasal spray 2 Sprays by Both Nostrils route four (4) times daily. 15 mL 0    cyclobenzaprine (FLEXERIL) 5 mg tablet Take 1 Tab by mouth three (3) times daily as needed for Muscle Spasm(s). 30 Tab 3    diflorasone diacetate (PSORCON) 0.05 % ointment Apply  to affected area two (2) times daily as needed for Skin Irritation. 30 g 5    dicyclomine (BENTYL) 10 mg capsule dicyclomine 10 mg capsule      LORazepam (ATIVAN) 0.5 mg tablet Take 1 Tab by mouth daily. Max Daily Amount: 0.5 mg. 20 Tab 1    OTHER,NON-FORMULARY, Mupirocin 2% CA CHASE #1320g and Dispense Ciprofloxacin 3mg/ml CA OS #600ml and dispense Budesonide 0.5mg//2ml CA Vial #120.   Add 22g Mupirocin + 10ml CIprofloxacin + 2ml Budesonide + diluent to NRB & deliver into sinus cavity BID. 1 Each 0    tamsulosin (FLOMAX) 0.4 mg capsule TAKE 1 CAPSULE DAILY 90 Cap 3    fluticasone furoate-vilanteroL (Breo Ellipta) 100-25 mcg/dose inhaler Take 1 Puff by inhalation daily. 1 Inhaler 0    ketoconazole (NIZORAL) 2 % topical cream Apply  to affected area two (2) times daily as needed for Skin Irritation. 15 g 3    olsalazine (Dipentum) 250 mg capsule TAKE 2 CAPSULES DAILY 180 Cap 3    omeprazole (PRILOSEC) 20 mg capsule TAKE ONE CAPSULE BY MOUTH EVERY DAY FOR GASTROESOPHAGEAL REFLUX 90 Cap 3    celecoxib (CELEBREX) 200 mg capsule TAKE 1 CAPSULE DAILY FOR OSTEOARTHRITIS 90 Cap 3    finasteride (PROSCAR) 5 mg tablet TAKE 1 TABLET BY MOUTH DAILY 90 Tab 5    glucosamine/chondr hartmann A sod (OSTEO BI-FLEX PO) Take 1 Cap by mouth daily.  thiamine HCL (VITAMIN B-1) 100 mg tablet Take  by mouth daily.  VITAMIN B COMPLEX (B-100 PO) Take  by mouth daily.  CHLORPHENIRAMINE MALEATE (CHLOR-TABLET PO) Take 1 Tab by mouth two (2) times daily as needed.  traMADol (ULTRAM) 50 mg tablet Take 1 Tab by mouth every six (6) hours as needed for Pain. Max Daily Amount: 200 mg. 30 Tab 3    fluocinoLONE (SYNALAR) 0.025 % topical cream Apply  to affected area two (2) times a day.  fluticasone (FLONASE) 50 mcg/actuation nasal spray USE 2 SPRAYS TO EACH NOSTRIL DAILY (Patient taking differently: USE 2 SPRAYS TO EACH NOSTRIL DAILY as needed) 48 g 4    loratadine (CLARITIN REDITABS) 10 mg dissolvable tablet Take 10 mg by mouth daily as needed.  potassium 99 mg tablet Take 99 mg by mouth daily as needed.  aspirin 81 mg chewable tablet Take 81 mg by mouth every other day.  omega-3 fatty acids-vitamin e (FISH OIL) 1,000 mg cap Take 1 Cap by mouth daily.           INTERDISCIPLINARY COLLABORATION: n/a    After treatment position/precautions:  · Upright in bed  · call light within reach    Total Treatment Duration:   Time In: 1686  Time Out: MIKE Monge [employer] [facility], [facility] FABIOLA THOMPSON, CCC-SLP

## 2021-04-02 LAB — T3FREE SERPL-MCNC: 2.4 PG/ML (ref 2–4.4)

## 2021-04-15 ENCOUNTER — HOSPITAL ENCOUNTER (OUTPATIENT)
Dept: MAMMOGRAPHY | Age: 76
Discharge: HOME | End: 2021-04-15
Attending: NURSE PRACTITIONER
Payer: MEDICARE

## 2021-04-15 DIAGNOSIS — R92.8 OTHER ABNORMAL AND INCONCLUSIVE FINDINGS ON DIAGNOSTIC IMAGING OF BREAST: ICD-10-CM

## 2021-04-15 DIAGNOSIS — N63.0 BREAST LUMP: ICD-10-CM

## 2021-04-15 PROCEDURE — 76642 ULTRASOUND BREAST LIMITED: CPT

## 2021-04-15 PROCEDURE — 77066 DX MAMMO INCL CAD BI: CPT

## 2021-04-30 ENCOUNTER — HOME HEALTH ADMISSION (OUTPATIENT)
Dept: HOME HEALTH SERVICES | Facility: HOME HEALTH | Age: 76
End: 2021-04-30

## 2021-05-03 ENCOUNTER — HOME HEALTH ADMISSION (OUTPATIENT)
Dept: HOME HEALTH SERVICES | Facility: HOME HEALTH | Age: 76
End: 2021-05-03

## 2021-05-04 ENCOUNTER — HOME CARE VISIT (OUTPATIENT)
Dept: HOME HEALTH SERVICES | Facility: HOME HEALTH | Age: 76
End: 2021-05-04

## 2021-08-03 ENCOUNTER — HOSPITAL ENCOUNTER (OUTPATIENT)
Dept: GENERAL RADIOLOGY | Age: 76
Discharge: HOME | End: 2021-08-03
Payer: MEDICARE

## 2021-08-03 DIAGNOSIS — R05.9 COUGH: ICD-10-CM

## 2021-08-03 PROCEDURE — 71046 X-RAY EXAM CHEST 2 VIEWS: CPT

## 2021-11-08 ENCOUNTER — HOSPITAL ENCOUNTER (OUTPATIENT)
Dept: SURGERY | Age: 76
Discharge: HOME | End: 2021-11-08

## 2021-11-11 VITALS — WEIGHT: 218 LBS | HEIGHT: 70 IN | BODY MASS INDEX: 31.21 KG/M2

## 2021-11-14 ENCOUNTER — ANESTHESIA EVENT (OUTPATIENT)
Dept: SURGERY | Age: 76
End: 2021-11-14
Payer: MEDICARE

## 2021-11-15 ENCOUNTER — ANESTHESIA (OUTPATIENT)
Dept: SURGERY | Age: 76
End: 2021-11-15
Payer: MEDICARE

## 2021-11-15 ENCOUNTER — HOSPITAL ENCOUNTER (OUTPATIENT)
Age: 76
Setting detail: OUTPATIENT SURGERY
Discharge: HOME | End: 2021-11-15
Attending: OTOLARYNGOLOGY | Admitting: OTOLARYNGOLOGY
Payer: MEDICARE

## 2021-11-15 VITALS
OXYGEN SATURATION: 95 % | BODY MASS INDEX: 31.7 KG/M2 | WEIGHT: 220.9 LBS | DIASTOLIC BLOOD PRESSURE: 81 MMHG | RESPIRATION RATE: 16 BRPM | HEART RATE: 66 BPM | TEMPERATURE: 97.8 F | SYSTOLIC BLOOD PRESSURE: 169 MMHG

## 2021-11-15 PROCEDURE — 74011250636 HC RX REV CODE- 250/636: Performed by: ANESTHESIOLOGY

## 2021-11-15 PROCEDURE — 77030002916 HC SUT ETHLN J&J -A: Performed by: OTOLARYNGOLOGY

## 2021-11-15 PROCEDURE — 74011250636 HC RX REV CODE- 250/636: Performed by: REGISTERED NURSE

## 2021-11-15 PROCEDURE — 77030002888 HC SUT CHRMC J&J -A: Performed by: OTOLARYNGOLOGY

## 2021-11-15 PROCEDURE — 74011000250 HC RX REV CODE- 250: Performed by: ANESTHESIOLOGY

## 2021-11-15 PROCEDURE — 77030006907 HC BLD SNUS SHV MEDT -C: Performed by: OTOLARYNGOLOGY

## 2021-11-15 PROCEDURE — 76210000016 HC OR PH I REC 1 TO 1.5 HR: Performed by: OTOLARYNGOLOGY

## 2021-11-15 PROCEDURE — 74011250637 HC RX REV CODE- 250/637: Performed by: OTOLARYNGOLOGY

## 2021-11-15 PROCEDURE — 77030020268 HC MISC GENERAL SUPPLY: Performed by: OTOLARYNGOLOGY

## 2021-11-15 PROCEDURE — 77030018836 HC SOL IRR NACL ICUM -A: Performed by: OTOLARYNGOLOGY

## 2021-11-15 PROCEDURE — 2709999900 HC NON-CHARGEABLE SUPPLY: Performed by: OTOLARYNGOLOGY

## 2021-11-15 PROCEDURE — 74011000250 HC RX REV CODE- 250: Performed by: REGISTERED NURSE

## 2021-11-15 PROCEDURE — 77030040922 HC BLNKT HYPOTHRM STRY -A: Performed by: ANESTHESIOLOGY

## 2021-11-15 PROCEDURE — 77030037088 HC TUBE ENDOTRACH ORAL NSL COVD-A: Performed by: ANESTHESIOLOGY

## 2021-11-15 PROCEDURE — 76210000021 HC REC RM PH II 0.5 TO 1 HR: Performed by: OTOLARYNGOLOGY

## 2021-11-15 PROCEDURE — 74011250637 HC RX REV CODE- 250/637: Performed by: ANESTHESIOLOGY

## 2021-11-15 PROCEDURE — 77030008357 HC SPLNT NSL INT THOM -B: Performed by: OTOLARYNGOLOGY

## 2021-11-15 PROCEDURE — 74011000250 HC RX REV CODE- 250: Performed by: OTOLARYNGOLOGY

## 2021-11-15 PROCEDURE — 77030039425 HC BLD LARYNG TRULITE DISP TELE -A: Performed by: ANESTHESIOLOGY

## 2021-11-15 PROCEDURE — 77030018390 HC SPNG HEMSTAT2 J&J -B: Performed by: OTOLARYNGOLOGY

## 2021-11-15 PROCEDURE — 76010000138 HC OR TIME 0.5 TO 1 HR: Performed by: OTOLARYNGOLOGY

## 2021-11-15 PROCEDURE — 76060000032 HC ANESTHESIA 0.5 TO 1 HR: Performed by: OTOLARYNGOLOGY

## 2021-11-15 RX ORDER — FENTANYL CITRATE 50 UG/ML
100 INJECTION, SOLUTION INTRAMUSCULAR; INTRAVENOUS ONCE
Status: DISCONTINUED | OUTPATIENT
Start: 2021-11-15 | End: 2021-11-15 | Stop reason: HOSPADM

## 2021-11-15 RX ORDER — SODIUM CHLORIDE, SODIUM LACTATE, POTASSIUM CHLORIDE, CALCIUM CHLORIDE 600; 310; 30; 20 MG/100ML; MG/100ML; MG/100ML; MG/100ML
150 INJECTION, SOLUTION INTRAVENOUS CONTINUOUS
Status: DISCONTINUED | OUTPATIENT
Start: 2021-11-15 | End: 2021-11-15 | Stop reason: HOSPADM

## 2021-11-15 RX ORDER — ACETAMINOPHEN 500 MG
1000 TABLET ORAL
Status: DISCONTINUED | OUTPATIENT
Start: 2021-11-15 | End: 2021-11-15 | Stop reason: HOSPADM

## 2021-11-15 RX ORDER — NEOSTIGMINE METHYLSULFATE 1 MG/ML
INJECTION, SOLUTION INTRAVENOUS AS NEEDED
Status: DISCONTINUED | OUTPATIENT
Start: 2021-11-15 | End: 2021-11-15 | Stop reason: HOSPADM

## 2021-11-15 RX ORDER — FAMOTIDINE 20 MG/1
20 TABLET, FILM COATED ORAL ONCE
Status: COMPLETED | OUTPATIENT
Start: 2021-11-15 | End: 2021-11-15

## 2021-11-15 RX ORDER — MUPIROCIN 20 MG/G
OINTMENT TOPICAL AS NEEDED
Status: DISCONTINUED | OUTPATIENT
Start: 2021-11-15 | End: 2021-11-15 | Stop reason: HOSPADM

## 2021-11-15 RX ORDER — HYDROMORPHONE HYDROCHLORIDE 2 MG/ML
0.5 INJECTION, SOLUTION INTRAMUSCULAR; INTRAVENOUS; SUBCUTANEOUS
Status: DISCONTINUED | OUTPATIENT
Start: 2021-11-15 | End: 2021-11-15 | Stop reason: HOSPADM

## 2021-11-15 RX ORDER — DEXAMETHASONE SODIUM PHOSPHATE 4 MG/ML
INJECTION, SOLUTION INTRA-ARTICULAR; INTRALESIONAL; INTRAMUSCULAR; INTRAVENOUS; SOFT TISSUE AS NEEDED
Status: DISCONTINUED | OUTPATIENT
Start: 2021-11-15 | End: 2021-11-15 | Stop reason: HOSPADM

## 2021-11-15 RX ORDER — ROCURONIUM BROMIDE 10 MG/ML
INJECTION, SOLUTION INTRAVENOUS AS NEEDED
Status: DISCONTINUED | OUTPATIENT
Start: 2021-11-15 | End: 2021-11-15 | Stop reason: HOSPADM

## 2021-11-15 RX ORDER — LIDOCAINE HYDROCHLORIDE AND EPINEPHRINE 10; 10 MG/ML; UG/ML
INJECTION, SOLUTION INFILTRATION; PERINEURAL AS NEEDED
Status: DISCONTINUED | OUTPATIENT
Start: 2021-11-15 | End: 2021-11-15 | Stop reason: HOSPADM

## 2021-11-15 RX ORDER — MIDAZOLAM HYDROCHLORIDE 1 MG/ML
2 INJECTION, SOLUTION INTRAMUSCULAR; INTRAVENOUS
Status: DISCONTINUED | OUTPATIENT
Start: 2021-11-15 | End: 2021-11-15 | Stop reason: HOSPADM

## 2021-11-15 RX ORDER — GLYCOPYRROLATE 0.2 MG/ML
INJECTION INTRAMUSCULAR; INTRAVENOUS AS NEEDED
Status: DISCONTINUED | OUTPATIENT
Start: 2021-11-15 | End: 2021-11-15 | Stop reason: HOSPADM

## 2021-11-15 RX ORDER — SODIUM CHLORIDE 0.9 % (FLUSH) 0.9 %
5-40 SYRINGE (ML) INJECTION EVERY 8 HOURS
Status: DISCONTINUED | OUTPATIENT
Start: 2021-11-15 | End: 2021-11-15 | Stop reason: HOSPADM

## 2021-11-15 RX ORDER — HYDROCODONE BITARTRATE AND ACETAMINOPHEN 5; 325 MG/1; MG/1
1 TABLET ORAL AS NEEDED
Status: DISCONTINUED | OUTPATIENT
Start: 2021-11-15 | End: 2021-11-15 | Stop reason: HOSPADM

## 2021-11-15 RX ORDER — ONDANSETRON 2 MG/ML
INJECTION INTRAMUSCULAR; INTRAVENOUS AS NEEDED
Status: DISCONTINUED | OUTPATIENT
Start: 2021-11-15 | End: 2021-11-15 | Stop reason: HOSPADM

## 2021-11-15 RX ORDER — LIDOCAINE HYDROCHLORIDE 10 MG/ML
0.1 INJECTION INFILTRATION; PERINEURAL AS NEEDED
Status: DISCONTINUED | OUTPATIENT
Start: 2021-11-15 | End: 2021-11-15 | Stop reason: HOSPADM

## 2021-11-15 RX ORDER — PROPOFOL 10 MG/ML
INJECTION, EMULSION INTRAVENOUS AS NEEDED
Status: DISCONTINUED | OUTPATIENT
Start: 2021-11-15 | End: 2021-11-15 | Stop reason: HOSPADM

## 2021-11-15 RX ORDER — OXYMETAZOLINE HCL 0.05 %
SPRAY, NON-AEROSOL (ML) NASAL AS NEEDED
Status: DISCONTINUED | OUTPATIENT
Start: 2021-11-15 | End: 2021-11-15 | Stop reason: HOSPADM

## 2021-11-15 RX ORDER — SODIUM CHLORIDE 9 MG/ML
50 INJECTION, SOLUTION INTRAVENOUS CONTINUOUS
Status: DISCONTINUED | OUTPATIENT
Start: 2021-11-15 | End: 2021-11-15 | Stop reason: HOSPADM

## 2021-11-15 RX ORDER — ACETAMINOPHEN 500 MG
1000 TABLET ORAL ONCE
Status: COMPLETED | OUTPATIENT
Start: 2021-11-15 | End: 2021-11-15

## 2021-11-15 RX ORDER — SODIUM CHLORIDE 0.9 % (FLUSH) 0.9 %
5-40 SYRINGE (ML) INJECTION AS NEEDED
Status: DISCONTINUED | OUTPATIENT
Start: 2021-11-15 | End: 2021-11-15 | Stop reason: HOSPADM

## 2021-11-15 RX ORDER — FENTANYL CITRATE 50 UG/ML
INJECTION, SOLUTION INTRAMUSCULAR; INTRAVENOUS AS NEEDED
Status: DISCONTINUED | OUTPATIENT
Start: 2021-11-15 | End: 2021-11-15 | Stop reason: HOSPADM

## 2021-11-15 RX ORDER — KETAMINE HYDROCHLORIDE 10 MG/ML
10 INJECTION, SOLUTION INTRAMUSCULAR; INTRAVENOUS
Status: DISCONTINUED | OUTPATIENT
Start: 2021-11-15 | End: 2021-11-15 | Stop reason: HOSPADM

## 2021-11-15 RX ORDER — LIDOCAINE HYDROCHLORIDE 20 MG/ML
INJECTION, SOLUTION EPIDURAL; INFILTRATION; INTRACAUDAL; PERINEURAL AS NEEDED
Status: DISCONTINUED | OUTPATIENT
Start: 2021-11-15 | End: 2021-11-15 | Stop reason: HOSPADM

## 2021-11-15 RX ADMIN — DEXAMETHASONE SODIUM PHOSPHATE 10 MG: 4 INJECTION, SOLUTION INTRAMUSCULAR; INTRAVENOUS at 16:42

## 2021-11-15 RX ADMIN — ROCURONIUM BROMIDE 25 MG: 10 INJECTION, SOLUTION INTRAVENOUS at 16:32

## 2021-11-15 RX ADMIN — FAMOTIDINE 20 MG: 20 TABLET, FILM COATED ORAL at 14:04

## 2021-11-15 RX ADMIN — KETAMINE HYDROCHLORIDE 10 MG: 10 INJECTION, SOLUTION INTRAMUSCULAR; INTRAVENOUS at 17:52

## 2021-11-15 RX ADMIN — HYDROMORPHONE HYDROCHLORIDE 0.5 MG: 2 INJECTION INTRAMUSCULAR; INTRAVENOUS; SUBCUTANEOUS at 17:34

## 2021-11-15 RX ADMIN — FENTANYL CITRATE 50 MCG: 50 INJECTION INTRAMUSCULAR; INTRAVENOUS at 16:32

## 2021-11-15 RX ADMIN — LIDOCAINE HYDROCHLORIDE 0.1 ML: 10 INJECTION, SOLUTION INFILTRATION; PERINEURAL at 14:15

## 2021-11-15 RX ADMIN — GLYCOPYRROLATE 0.4 MG: 0.2 INJECTION, SOLUTION INTRAMUSCULAR; INTRAVENOUS at 17:07

## 2021-11-15 RX ADMIN — PROPOFOL 150 MG: 10 INJECTION, EMULSION INTRAVENOUS at 16:32

## 2021-11-15 RX ADMIN — HYDROMORPHONE HYDROCHLORIDE 0.5 MG: 2 INJECTION INTRAMUSCULAR; INTRAVENOUS; SUBCUTANEOUS at 17:40

## 2021-11-15 RX ADMIN — SODIUM CHLORIDE, SODIUM LACTATE, POTASSIUM CHLORIDE, AND CALCIUM CHLORIDE: 600; 310; 30; 20 INJECTION, SOLUTION INTRAVENOUS at 16:28

## 2021-11-15 RX ADMIN — FENTANYL CITRATE 50 MCG: 50 INJECTION INTRAMUSCULAR; INTRAVENOUS at 16:42

## 2021-11-15 RX ADMIN — SODIUM CHLORIDE, SODIUM LACTATE, POTASSIUM CHLORIDE, AND CALCIUM CHLORIDE 150 ML/HR: 600; 310; 30; 20 INJECTION, SOLUTION INTRAVENOUS at 14:14

## 2021-11-15 RX ADMIN — HYDROCODONE BITARTRATE AND ACETAMINOPHEN 1 TABLET: 5; 325 TABLET ORAL at 18:22

## 2021-11-15 RX ADMIN — SODIUM CHLORIDE, SODIUM LACTATE, POTASSIUM CHLORIDE, AND CALCIUM CHLORIDE 150 ML/HR: 600; 310; 30; 20 INJECTION, SOLUTION INTRAVENOUS at 17:54

## 2021-11-15 RX ADMIN — LIDOCAINE HYDROCHLORIDE 100 MG: 20 INJECTION, SOLUTION EPIDURAL; INFILTRATION; INTRACAUDAL; PERINEURAL at 16:32

## 2021-11-15 RX ADMIN — PROPOFOL 30 MG: 10 INJECTION, EMULSION INTRAVENOUS at 16:33

## 2021-11-15 RX ADMIN — Medication 3 MG: at 17:07

## 2021-11-15 RX ADMIN — ACETAMINOPHEN 1000 MG: 500 TABLET, FILM COATED ORAL at 14:04

## 2021-11-15 RX ADMIN — HYDROMORPHONE HYDROCHLORIDE 0.5 MG: 2 INJECTION INTRAMUSCULAR; INTRAVENOUS; SUBCUTANEOUS at 17:23

## 2021-11-15 RX ADMIN — HYDROMORPHONE HYDROCHLORIDE 0.5 MG: 2 INJECTION INTRAMUSCULAR; INTRAVENOUS; SUBCUTANEOUS at 17:29

## 2021-11-15 RX ADMIN — ONDANSETRON 4 MG: 2 INJECTION INTRAMUSCULAR; INTRAVENOUS at 16:42

## 2021-11-15 NOTE — DISCHARGE INSTRUCTIONS
Patient Education        Endoscopic Sinus Surgery: What to Expect at [address] will have a drip pad under your nose to collect mucus and blood. Change it only when it bleeds through. You may have to do this every hour for 24 hours after surgery. You may have some swelling of your nose, upper lip, or cheeks, or around your eyes. Your nose may be sore and will bleed. You may feel \"stuffed up\" like you have a bad head cold. This will last for several days after surgery. The tip of your nose and your upper lip and gums may be numb. Feeling will return in a few weeks to a few months. Your sense of smell will not be as good after surgery. It will improve and probably return to normal in 1 to 2 months. You will probably be able to return to work or school in about 1 week and to your normal routine in about 3 weeks. But this varies with your job and the extent of your surgery. Most people feel normal in 1 to 2 months. You will have to visit your doctor regularly for 3 to 4 months after your surgery. Your doctor will check to see that your sinuses are healing well. This care sheet gives you a general idea about how long it will take for you to recover. But each person recovers at a different pace. Follow the steps below to get better as quickly as possible. How can you care for yourself at home? Activity    · Rest when you feel tired. Getting enough sleep will help you recover. Do not lie flat. Raise your head with three or four pillows. This can reduce swelling. Try to sleep on your back during the month after surgery. You can also sleep in a reclining chair.     · Try to walk each day. Start by walking a little more than you did the day before. Bit by bit, increase the amount you walk. Walking boosts blood flow and helps prevent pneumonia and constipation. Also, try to sit and stand as much as you can.     · For 1 week, try not to bend over or lift anything heavier than 10 pounds.  This may include a child, heavy grocery bags and milk containers, a heavy briefcase or backpack, cat litter or dog food bags, or a vacuum .     · You can take a shower or bath. Use lukewarm, not hot water. Avoid swimming for 6 weeks.     · Avoid sawdust, chemicals, and excessive dust for 4 weeks.     · Avoid strenuous activities, such as biking, jogging, weight lifting, or aerobic exercise, for 1 week and then ease back into these activities over 2 to 3 weeks.     · You may drive when you are no longer taking prescription pain medicine and feel up to it.     · You will probably be able to return to work or school in about 1 week and to your normal routine in about 3 weeks. But this varies with your job and the extent of your surgery. Diet    · You can eat your normal diet. If your stomach is upset, try bland, low-fat foods like plain rice, broiled chicken, toast, and yogurt.     · You may notice that your bowel movements are not regular right after your surgery. This is common. Try to avoid constipation and straining with bowel movements. You may want to take a fiber supplement every day. If you have not had a bowel movement after a couple of days, ask your doctor about taking a mild laxative. Medicines    · Your doctor will tell you if and when you can restart your medicines. He or she will also give you instructions about taking any new medicines.     · If you take aspirin or some other blood thinner, ask your doctor if and when to start taking it again. Make sure that you understand exactly what your doctor wants you to do.     · Do not take aspirin, aspirin-containing medicines, or anti-inflammatory medicines such as ibuprofen (Advil, Motrin) or naproxen (Aleve) for 3 weeks following surgery unless your doctor says it is okay.     · Take pain medicines exactly as directed. ? If the doctor gave you a prescription medicine for pain, take it as prescribed.   ? Do not take two or more pain medicines at the same time unless the doctor told you to. Many pain medicines have acetaminophen, which is Tylenol. Too much acetaminophen (Tylenol) can be harmful.     · If your doctor prescribed antibiotics, take them as directed. Do not stop taking them just because you feel better. You need to take the full course of antibiotics.     · If you think your pain medicine is making you sick to your stomach:  ? Take your medicine after meals (unless your doctor has told you not to). ? Ask your doctor for a different pain medicine. Incision care    · You probably will not have an incision (cut). You will have a drip pad under your nose to collect blood. Change it only when it has bled through. You may have to do this every hour for 24 hours after surgery. When bleeding stops, you can remove it.     · If you have packing in your nose, leave it in. Your doctor will take it out. Ice and elevation    · To help with swelling and pain, put ice or a cold pack on your nose for 10 to 20 minutes at a time. Put a thin cloth between the ice and your skin.     · Do not sleep flat. Sleep with your head raised up. You can also sleep in a reclining chair. Other instructions    · Do not blow your nose for 2 weeks.     · Do not put anything into your nose.     · If you must sneeze, open your mouth and sneeze naturally.     · Keep your mouth clean. Rinse your mouth with salt water or an alcohol-free mouthwash after each meal and before bedtime.     · After any packing is removed, use saline (saltwater) nasal washes to help keep your nasal passages open and wash out mucus and bacteria. You can buy saline nose drops at a grocery store or drugstore.     · Use a nasal spray containing a steroid to reduce inflammation.     · Use a humidifier to keep room air moist, especially in the bedroom.     · You can wear your glasses when you wish. Do not wear contacts until the day after the surgery.     · Do not travel by airplane for at least 2 weeks.  Your sinuses are still healing, and the changes in air pressure can affect them. Follow-up care is a key part of your treatment and safety. Be sure to make and go to all appointments, and call your doctor if you are having problems. It's also a good idea to know your test results and keep a list of the medicines you take. When should you call for help? Call 911 anytime you think you may need emergency care. For example, call if:    · You passed out (lost consciousness).     · You have severe trouble breathing.     · You have chest pain, have shortness of breath, or you cough up blood. Call your doctor now or seek immediate medical care if:    · You have signs of infection, such as:  ? Increased pain, swelling, warmth, or redness. ? Red streaks leading from the incision. ? Pus draining from the incision. ? A fever.     · You bleed through the bandage.     · You have symptoms of a blood clot in your leg (called a deep vein thrombosis), such as:  ? Pain in the calf, back of the knee, thigh, or groin. ? Redness and swelling in your leg or groin.     · You have pain that does not get better after you take pain medicine. Watch closely for changes in your health, and be sure to contact your doctor if:    · You do not get better as expected. Where can you learn more? Go to http://www.gray.com/  Enter K577 in the search box to learn more about \"Endoscopic Sinus Surgery: What to Expect at Home. \"  Current as of: December 2, 2020               Content Version: 13.0  © 2006-2021 [employer]. Care instructions adapted under license by [employer] (which disclaims liability or warranty for this information). If you have questions about a medical condition or this instruction, always ask your healthcare professional. [address] any warranty or liability for your use of this information.

## 2021-11-15 NOTE — ANESTHESIA PREPROCEDURE EVALUATION
Relevant Problems   No relevant active problems       Anesthetic History               Review of Systems / Medical History  Patient summary reviewed and pertinent labs reviewed    Pulmonary                   Neuro/Psych         TIA    Comments: Idiopathic peripheral neuropathy  6 months ago apparent TIA---brain MRI OK Cardiovascular                  Exercise tolerance: >4 METS     GI/Hepatic/Renal     GERD           Endo/Other        Arthritis     Other Findings            Physical Exam    Airway  Mallampati: III  TM Distance: > 6 cm  Neck ROM: normal range of motion   Mouth opening: Normal     Cardiovascular    Rhythm: regular           Dental  No notable dental hx       Pulmonary                 Abdominal  GI exam deferred       Other Findings            Anesthetic Plan    ASA: 3  Anesthesia type: general          Induction: Intravenous  Anesthetic plan and risks discussed with: Patient

## 2021-11-16 NOTE — OP NOTES
[place]  OPERATIVE REPORT    Name:  Selma Lawson  MR#:  [id number]  :  1945  ACCOUNT #:  [de-identified]  DATE OF SERVICE:  11/15/2021    PREOPERATIVE DIAGNOSES:  Deviated nasal septum, inferior turbinate hypertrophy, nasal obstruction and acquired deformity of the nose, and nasal lesions bilaterally. POSTOPERATIVE DIAGNOSES:  Deviated nasal septum, inferior turbinates hypertrophy, nasal obstruction and acquired deformity of the nose, and nasal lesions bilaterally. PROCEDURE PERFORMED:  Septoplasty, bilateral submucosal resection of the inferior turbinates, and radiofrequency ablation of septal swells. SURGEON:  Pamela Nicolas DO    ASSISTANT:  None. ANESTHESIA:  General.    COMPLICATIONS:  None. SPECIMENS REMOVED:  None. IMPLANTS:  Braden splints. ESTIMATED BLOOD LOSS:  100 mL. HISTORY:  This is a 68-year-old male who came to see me in the office. He has been seeing me for several years because of chronic nasal and sinus issues. He has continued to have nasal obstruction despite all the other medical therapies that I have recommended to him. So, he does have a deviated nasal septum, it goes mainly towards the right. He has a deviated septum also towards the left. He has severe inferior turbinate hypertrophy and he also has bilateral septal swells along with bilateral positive Dickson test and bilateral positive modified Dickson test.  So, since he has failed medical therapy, it was my recommendation that he undergo a septoplasty, turbinate reduction and bilateral radiofrequency ablation of septal swells and nasal valve area. Procedure, risks and benefits were discussed with him in the office. All questions were answered and he was agreeable to the surgery. SURGERY DETAILS:  The patient was identified in the preoperative waiting area where he underwent general anesthesia.   Approximately 3 mL of 1% lidocaine with epinephrine were injected into the inferior turbinates and septum bilaterally. Afrin-soaked pledgets were placed in each side of the nose and left there for a few minutes; these were then removed. A right-sided hemitransfixion incision was made in the anterior septum. Left-sided submucoperichondrial and submucoperiosteal flaps were then raised. I came back to the bony cartilaginous junction, broke through that, raised a right-sided submucoperiosteal flap. Deviated portions of the nasal septum were both bony and cartilaginous. He had a posterior septal spur bilaterally. He also had a very tortuous maxillary crest.  So, using a combination of a Gallia elevator, Brittany forceps, open Jorge A-Chaudhry and hammer and chisel, I was able to isolate and remove the deviated portions of the nasal septum. A 4-0 chromic was used to reapproximate the septal flaps back in the midline position and this was also used to close the hemitransfixion incision. A knife was then used to make a small stab incision in the anterior portion of the inferior turbinates. Howie Hung was used to create a small pocket between the bone of the inferior turbinate and the mucosa medially, and a microdebrider with a turbinate blade was used to reduce the prominent bone and tissue of the inferior turbinates bilaterally. Boies elevator was used to medialize and lateralize the inferior turbinates and this gave the patient a widely patent nasal airway. He is still with the anterior septal swells along with a collapse from the internal nasal valve area. So, I then injected another 1.5 mL into the anterior septum and the internal nasal valve area bilaterally and then started to use the radiofrequency ablation wand, Gilda Cartagena, to reduce the septal and nasal valve swells. I was able to treat five different areas bilaterally and this did give a nice reduction of the septal swells and the nasal valve area, so that the nasal valves appeared to be widely patent.   So, the lesions were cleared bilaterally. Braden splints with antibiotic ointment on them were then placed in each side of the nose and sewn in place anteriorly using a single nylon stitch, then the patient was awakened and taken to the postop recovery room in stable condition.       Amina Nova DO      [id number]  D:  11/15/2021 17:26  T:  11/16/2021 4:17  JOB #:  [id number]

## 2022-02-14 PROCEDURE — 88305 TISSUE EXAM BY PATHOLOGIST: CPT

## 2022-02-15 ENCOUNTER — HOSPITAL ENCOUNTER (OUTPATIENT)
Dept: LAB | Age: 77
Discharge: HOME | End: 2022-02-15

## 2022-06-24 ENCOUNTER — TELEPHONE (OUTPATIENT)
Dept: INTERNAL MEDICINE CLINIC | Facility: CLINIC | Age: 77
End: 2022-06-24

## 2022-06-24 NOTE — TELEPHONE ENCOUNTER
Called and gave pt's neighbor an appt with Pamela for 6/30/2022- they will keep the appt for 7/8/2022 as well  If they decide to cancel an appt they will call the office

## 2022-06-24 NOTE — TELEPHONE ENCOUNTER
----- Message from Leena Costello sent at 6/24/2022  9:13 AM EDT -----  Subject: Message to Provider    QUESTIONS  Information for Provider? Pt was seen in the ED because he threw up blood. He needs to schedule an ED Follow up. I schedule an appt for 7/8/22 at   8:15 but he would like to be seen earlier. Please call pt to advise if he   can get an earlier appt.  ---------------------------------------------------------------------------  --------------  CALL BACK INFO  What is the best way for the office to contact you? OK to leave message on   voicemail  Preferred Call Back Phone Number? [phone]  ---------------------------------------------------------------------------  --------------  SCRIPT ANSWERS  Relationship to Patient?  Self

## 2022-06-30 ENCOUNTER — OFFICE VISIT (OUTPATIENT)
Dept: INTERNAL MEDICINE CLINIC | Facility: CLINIC | Age: 77
End: 2022-06-30
Payer: MEDICARE

## 2022-06-30 VITALS
WEIGHT: 215 LBS | RESPIRATION RATE: 17 BRPM | HEIGHT: 70 IN | BODY MASS INDEX: 30.78 KG/M2 | OXYGEN SATURATION: 97 % | TEMPERATURE: 98.3 F | SYSTOLIC BLOOD PRESSURE: 151 MMHG | DIASTOLIC BLOOD PRESSURE: 73 MMHG | HEART RATE: 63 BPM

## 2022-06-30 DIAGNOSIS — M19.90 ARTHRITIS: ICD-10-CM

## 2022-06-30 DIAGNOSIS — K29.01 OTHER ACUTE GASTRITIS WITH HEMORRHAGE: Primary | ICD-10-CM

## 2022-06-30 PROCEDURE — 99214 OFFICE O/P EST MOD 30 MIN: CPT | Performed by: NURSE PRACTITIONER

## 2022-06-30 RX ORDER — PANTOPRAZOLE SODIUM 40 MG/1
40 TABLET, DELAYED RELEASE ORAL DAILY
Qty: 90 TABLET | Refills: 1 | Status: SHIPPED | OUTPATIENT
Start: 2022-06-30

## 2022-06-30 RX ORDER — MULTIVIT WITH MINERALS/LUTEIN
250 TABLET ORAL DAILY
COMMUNITY

## 2022-06-30 RX ORDER — ZINC GLUCONATE 50 MG
50 TABLET ORAL DAILY
COMMUNITY

## 2022-06-30 SDOH — ECONOMIC STABILITY: FOOD INSECURITY: WITHIN THE PAST 12 MONTHS, YOU WORRIED THAT YOUR FOOD WOULD RUN OUT BEFORE YOU GOT MONEY TO BUY MORE.: NEVER TRUE

## 2022-06-30 SDOH — ECONOMIC STABILITY: FOOD INSECURITY: WITHIN THE PAST 12 MONTHS, THE FOOD YOU BOUGHT JUST DIDN'T LAST AND YOU DIDN'T HAVE MONEY TO GET MORE.: NEVER TRUE

## 2022-06-30 ASSESSMENT — ENCOUNTER SYMPTOMS
NAUSEA: 0
VOMITING: 1
ABDOMINAL PAIN: 0
SHORTNESS OF BREATH: 0
CONSTIPATION: 0
BLOOD IN STOOL: 0

## 2022-06-30 ASSESSMENT — SOCIAL DETERMINANTS OF HEALTH (SDOH)
HOW HARD IS IT FOR YOU TO PAY FOR THE VERY BASICS LIKE FOOD, HOUSING, MEDICAL CARE, AND HEATING?: NOT HARD AT ALL
HOW HARD IS IT FOR YOU TO PAY FOR THE VERY BASICS LIKE FOOD, HOUSING, MEDICAL CARE, AND HEATING?: NOT HARD AT ALL

## 2022-06-30 NOTE — PROGRESS NOTES
6/30/2022 9:18 AM  Location:[facility] [address] [facility]  [place]  Patient #:  [id number]  YOB: 1945          YOUR LAST HEMOGLOBIN A1CS:   No results found for: HBA1C, [id number]    YOUR LAST LIPID PROFILE:   Lab Results   Component Value Date/Time    CHOL 133 04/01/2021 04:17 AM    HDL 52 04/01/2021 04:17 AM         Lab Results   Component Value Date/Time    GFRAA 89 12/01/2021 03:00 PM    BUN 11 12/01/2021 03:00 PM     12/01/2021 03:00 PM    K 4.5 12/01/2021 03:00 PM     12/01/2021 03:00 PM    CO2 23 12/01/2021 03:00 PM           History of Present Illness     Chief Complaint   Patient presents with    Abdominal Pain     ER visit while on vacation. Forgot to take GI meds        Mr. Izzy Oakes is a 64 y.o. male  who presents for [facility] follow-up. This patient presents for hospital follow-up. He has a history of colitis on Dipentum, arthritis on Celebrex. He was on vacation recently and forgot his omeprazole which he takes daily for reflux symptoms. He had 1 episode of hematemesis with increased GERD symptoms. He was admitted to the hospital between June 21 and June 23, underwent an EGD on June 23 and ultimately diagnosed with gastritis. His hemoglobin was stable at 13.9. He was discharged home to have follow-up with his gastroenterologist and PCP. Notes that he restarted his omeprazole, has had no further hematemesis but has some intermittent reflux symptoms. He denies any further hematemesis, denies any melena or hematochezia, fevers or chills. His last bowel movement was yesterday. We note that he continues to take Celebrex for his arthritis. He also drinks occasionally. He reports feeling improved but fatigue since his recent COVID infection in April. He is eating and drinking well and has no new complaints today.          Allergies   Allergen Reactions    Latex Other (See Comments)     \"it burns\"      Past Medical History:   Diagnosis Date    Acute pancreatitis 8/17/2015    Allergic rhinitis 12/6/2015    Arthritis     OA    Backache, unspecified 8/17/2015    Benign neoplasm of colon 08/17/2015    hx    Benign neoplasm of other specified sites of skin 8/17/2015    Benign non-nodular prostatic hyperplasia with lower urinary tract symptoms 8/17/2015    Cervical arthritis 5/30/2017    Chronic sinusitis 8/17/2015    Depressive disorder, not elsewhere classified 8/17/2015    Deviated nasal septum 9/20/2016    Esophageal reflux 8/17/2015    Family history of sudden cardiac death     GERD (gastroesophageal reflux disease)     controlled w/med    History of COVID-19 03/2020    \"flu like symptoms\", HA    Hypertrophy of prostate with urinary obstruction and other lower urinary tract symptoms (LUTS) 08/17/2015    managed with medication, followed by PCP Dr. Adelina Alvarez Mixed hearing loss 09/20/2016    bilateral hearing aids     Osteoarthritis of multiple joints 8/22/2018    Other and unspecified noninfectious gastroenteritis and colitis(558.9) 08/17/2015    Other malaise and fatigue 8/17/2015    Other urethral bulbous stricture, male 12/28/2018    Peripheral neuropathy 8/17/2015    Primary osteoarthritis of knee 8/3/2016    Sinus problem     SNHL (sensorineural hearing loss) 9/20/2016    TIA (transient ischemic attack) 03/31/2021    denies residual effects    Tinnitus 9/20/2016    Ulcerative colitis, unspecified     Ulcerative colitis, unspecified 08/17/2015    Unspecified hearing loss 08/17/2015    hearing aids bilat    Unspecified hemorrhoids with other complication 1/40/6137    Unspecified hereditary and idiopathic peripheral neuropathy 08/17/2015    bilat LE d/t spinal arthritis    Urinary frequency 8/17/2015     Social History     Socioeconomic History    Marital status:      Spouse name: None    Number of children: None    Years of education: None    Highest education level: None   Occupational History    None   Tobacco Use    Smoking status: Never Smoker    Smokeless tobacco: Never Used   Substance and Sexual Activity    Alcohol use: Yes     Alcohol/week: 6.0 standard drinks    Drug use: No    Sexual activity: None   Other Topics Concern    None   Social History Narrative     and lives with wife. Retired. Previously served in [employer]. Social Determinants of Health     Financial Resource Strain: Low Risk     Difficulty of Paying Living Expenses: Not hard at all   Food Insecurity: No Food Insecurity    Worried About Running Out of Food in the Last Year: Never true    Cyrus of Food in the Last Year: Never true   Transportation Needs:     Lack of Transportation (Medical): Not on file    Lack of Transportation (Non-Medical):  Not on file   Physical Activity:     Days of Exercise per Week: Not on file    Minutes of Exercise per Session: Not on file   Stress:     Feeling of Stress : Not on file   Social Connections:     Frequency of Communication with Friends and Family: Not on file    Frequency of Social Gatherings with Friends and Family: Not on file    Attends Mu-ism Services: Not on file    Active Member of [address] [place] [address] or Organizations: Not on file    Attends Club or Organization Meetings: Not on file    Marital Status: Not on file   Intimate Partner Violence:     Fear of Current or Ex-Partner: Not on file    Emotionally Abused: Not on file    Physically Abused: Not on file    Sexually Abused: Not on file   Housing Stability:     Unable to Pay for Housing in the Last Year: Not on file    Number of [place] in the Last Year: Not on file    Unstable Housing in the Last Year: Not on file     Past Surgical History:   Procedure Laterality Date    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  Ascorbic Acid (VITAMIN C) 250 MG tablet Take 250 mg by mouth daily      zinc gluconate 50 MG tablet Take 50 mg by mouth daily      albuterol sulfate  (90 Base) MCG/ACT inhaler INHALE 2 PUFFS BY MOUTH EVERY 6 HOURS AS NEEDED FOR WHEEZING      ascorbic acid (VITAMIN C) 500 MG tablet Take by mouth      aspirin 81 MG chewable tablet Take 81 mg by mouth every other day      celecoxib (CELEBREX) 200 MG capsule TAKE 1 CAPSULE DAILY FOR OSTEOARTHRITIS      cyanocobalamin 100 MCG tablet Take 100 mcg by mouth daily      cyclobenzaprine (FLEXERIL) 5 MG tablet TAKE 1 TABLET BY MOUTH THREE TIMES DAILY AS NEEDED FOR MUSCLE SPASMS      diflorasone (PSORCON) 0.05 % ointment Apply topically 2 times daily as needed      finasteride (PROSCAR) 5 MG tablet Take 5 mg by mouth daily      fluocinolone (SYNALAR) 0.025 % cream Apply topically 2 times daily      ketoconazole (NIZORAL) 2 % cream Apply topically 2 times daily as needed      olsalazine (DIPENTUM) 250 MG capsule TAKE 2 CAPSULES DAILY      Omega-3 Fatty Acids (FISH OIL) 1000 MG CAPS Take 1 capsule by mouth      omeprazole (PRILOSEC) 20 MG delayed release capsule TAKE 1 CAPSULE DAILY FOR GASTROESOPHAGEAL REFLUX      potassium gluconate 550 mg tablet Take 99 mg by mouth daily as needed      tamsulosin (FLOMAX) 0.4 MG capsule TAKE 1 CAPSULE DAILY      thiamine 100 MG tablet Take by mouth daily      tolterodine (DETROL LA) 2 MG extended release capsule Take 4 mg by mouth daily      dicyclomine (BENTYL) 10 MG capsule Dicyclomine 10 mg capsule      pantoprazole (PROTONIX) 40 MG tablet Take 40 mg by mouth daily (Patient not taking: Reported on 6/30/2022)      traMADol (ULTRAM) 50 MG tablet Take 50 mg by mouth every 6 hours as needed.  (Patient not taking: Reported on 6/30/2022)       No current facility-administered medications for this visit. Health Maintenance   Topic Date Due    COVID-19 Vaccine (1) Never done    Hepatitis C screen  Never done    Shingles vaccine (1 of 2) Never done    DTaP/Tdap/Td vaccine (1 - Tdap) 03/29/2005    Pneumococcal 65+ years Vaccine (1 - PCV) Never done    Prostate Specific Antigen (PSA) Screening or Monitoring  08/04/2022    Flu vaccine (Season Ended) 09/01/2022    Depression Screen  02/11/2023    Annual Wellness Visit (AWV)  02/12/2023    Hepatitis A vaccine  Aged Out    Hepatitis B vaccine  Aged Out    Hib vaccine  Aged Out    Meningococcal (ACWY) vaccine  Aged Out     Family History   Problem Relation Age of Onset    Cancer Father     Other Other         Sudden Cardiac Death, colitis, back problems    GERD Other              Review of Systems  Review of Systems   Constitutional: Positive for fatigue (had Covid in May). Negative for chills and fever. Respiratory: Negative for shortness of breath. Cardiovascular: Negative for chest pain. Gastrointestinal: Positive for vomiting (hematemesis while on vacation, resolved). Negative for abdominal pain, blood in stool, constipation and nausea. BP (!) 151/73 (Site: Left Upper Arm, Position: Sitting, Cuff Size: Large Adult)   Pulse 63   Temp 98.3 °F (36.8 °C) (Skin)   Resp 17   Ht 5' 10\" (1.778 m)   Wt 215 lb (97.5 kg)   SpO2 97%   BMI 30.85 kg/m²       Physical Exam    Physical Exam  Vitals reviewed. Constitutional:       General: He is not in acute distress. Appearance: He is not ill-appearing, toxic-appearing or diaphoretic. Neck:      Vascular: No carotid bruit. Cardiovascular:      Rate and Rhythm: Regular rhythm. Heart sounds: No murmur heard. Pulmonary:      Effort: No respiratory distress. Breath sounds: No stridor. No wheezing or rales. Abdominal:      General: Bowel sounds are decreased.       Palpations: (BENTYL) 10 MG capsule Dicyclomine 10 mg capsule      pantoprazole (PROTONIX) 40 MG tablet Take 40 mg by mouth daily (Patient not taking: Reported on 6/30/2022)      traMADol (ULTRAM) 50 MG tablet Take 50 mg by mouth every 6 hours as needed. (Patient not taking: Reported on 6/30/2022)       No current facility-administered medications for this visit. No orders of the defined types were placed in this encounter. Medications Discontinued During This Encounter   Medication Reason    amoxicillin-clavulanate (AUGMENTIN) 500-125 MG per tablet Therapy completed    methylPREDNISolone (MEDROL DOSEPACK) 4 MG tablet Therapy completed    LORazepam (ATIVAN) 0.5 MG tablet LIST CLEANUP    phenazopyridine (PYRIDIUM) 95 MG tablet LIST CLEANUP       1. Other acute gastritis with hemorrhage  Changed from omeprazole to pantoprazole and increased to 40 mg daily. Recent EGD showed gastritis, admitted for hematemesis x1. Has had no further hematemesis since he restarted his PPI. Will refer for gastroenterology to follow and evaluate recent EGD findings. Discussed limiting offending foods, decreasing caffeine, avoiding alcohol.  - [facility] - Hortencia Zepeda MD, Gastroenterology  - pantoprazole (PROTONIX) 40 MG tablet; Take 1 tablet by mouth daily  Dispense: 90 tablet; Refill: 1    2. Arthritis  Due to his recent episode of hematemesis, he will try stopping his Celebrex. I instructed him to take   500 mg Tylenol twice daily.           Ankush Teague NP, APRN - CNP

## 2022-06-30 NOTE — PATIENT INSTRUCTIONS
Patient Education        Gastritis: Care Instructions  Your Care Instructions     Gastritis is a sore and upset stomach. It happens when something irritates the stomach lining. Many things can cause it. These include an infection such as the flu or something you ate or drank. Medicines or a sore on the lining of the stomach (ulcer) also can cause it. Your belly may bloat and ache. You maybelch, vomit, and feel sick to your stomach. You should be able to relieve the problem by taking medicine. And it may helpto change your diet. If gastritis lasts, your doctor may prescribe medicine. Follow-up care is a key part of your treatment and safety. Be sure to make and go to all appointments, and call your doctor if you are having problems. It's also a good idea to know your test results and keep alist of the medicines you take. How can you care for yourself at home?  If your doctor prescribed antibiotics, take them as directed. Do not stop taking them just because you feel better. You need to take the full course of antibiotics.  Be safe with medicines. If your doctor prescribed medicine to decrease stomach acid, take it as directed. Call your doctor if you think you are having a problem with your medicine.  Do not take any other medicine, including over-the-counter pain relievers, without talking to your doctor first.  Henrietta Dubois If your doctor recommends over-the-counter medicine to reduce stomach acid, such as Pepcid AC (famotidine), Prilosec (omeprazole), or Tagamet HB (cimetidine) follow the directions on the label.  Drink plenty of fluids to prevent dehydration. Choose water and other clear liquids. If you have kidney, heart, or liver disease and have to limit fluids, talk with your doctor before you increase the amount of fluids you drink.  Avoid foods that make your symptoms worse.  These may include chocolate, mint, alcohol, pepper, spicy foods, high-fat foods, or drinks with caffeine in them, such as tea, coffee, vin, or energy drinks. If your symptoms are worse after you eat a certain food, you may want to stop eating it to see if your symptoms get better. When should you call for help? Call 911 anytime you think you may need emergency care. For example, call if:     You vomit blood or what looks like coffee grounds.      You pass maroon or very bloody stools. Call your doctor now or seek immediate medical care if:     You start breathing fast and have not produced urine in the last 8 hours.      You cannot keep fluids down. Watch closely for changes in your health, and be sure to contact your doctor if:     You do not get better as expected. Where can you learn more? Go to https://[employer]. org and sign in to your [employer] account. Enter [id number] in the [place] box to learn more about \"Gastritis: Care Instructions. \"     If you do not have an account, please click on the \"Sign Up Now\" link. Current as of: September 8, 2021               Content Version: 13.3  © 3512-0321 [employer]. Care instructions adapted under license by [facility] ([facility]). If you have questions about a medical condition or this instruction, always ask your healthcare professional. [place] [address] any warranty or liability for your use of this information.

## 2022-07-01 RX ORDER — OLSALAZINE SODIUM 250 MG/1
CAPSULE, GELATIN COATED ORAL
Qty: 180 CAPSULE | Refills: 3 | Status: SHIPPED | OUTPATIENT
Start: 2022-07-01

## 2022-08-18 ENCOUNTER — TELEPHONE (OUTPATIENT)
Dept: INTERNAL MEDICINE CLINIC | Facility: CLINIC | Age: 77
End: 2022-08-18

## 2022-08-18 ENCOUNTER — OFFICE VISIT (OUTPATIENT)
Dept: INTERNAL MEDICINE CLINIC | Facility: CLINIC | Age: 77
End: 2022-08-18
Payer: MEDICARE

## 2022-08-18 VITALS
OXYGEN SATURATION: 97 % | HEIGHT: 70 IN | RESPIRATION RATE: 18 BRPM | WEIGHT: 212.8 LBS | BODY MASS INDEX: 30.46 KG/M2 | HEART RATE: 65 BPM | TEMPERATURE: 97.3 F | DIASTOLIC BLOOD PRESSURE: 89 MMHG | SYSTOLIC BLOOD PRESSURE: 149 MMHG

## 2022-08-18 DIAGNOSIS — K52.9 COLITIS: Primary | ICD-10-CM

## 2022-08-18 DIAGNOSIS — M47.812 CERVICAL SPONDYLOSIS: ICD-10-CM

## 2022-08-18 LAB
BASOPHILS # BLD: 0.1 K/UL (ref 0–0.2)
BASOPHILS NFR BLD: 1 % (ref 0–2)
DIFFERENTIAL METHOD BLD: ABNORMAL
EOSINOPHIL # BLD: 0.4 K/UL (ref 0–0.8)
EOSINOPHIL NFR BLD: 5 % (ref 0.5–7.8)
ERYTHROCYTE [DISTWIDTH] IN BLOOD BY AUTOMATED COUNT: 12 % (ref 11.9–14.6)
HCT VFR BLD AUTO: 44.1 % (ref 41.1–50.3)
HGB BLD-MCNC: 15.6 G/DL (ref 13.6–17.2)
IMM GRANULOCYTES # BLD AUTO: 0 K/UL (ref 0–0.5)
IMM GRANULOCYTES NFR BLD AUTO: 1 % (ref 0–5)
LYMPHOCYTES # BLD: 2.1 K/UL (ref 0.5–4.6)
LYMPHOCYTES NFR BLD: 26 % (ref 13–44)
MCH RBC QN AUTO: 35.2 PG (ref 26.1–32.9)
MCHC RBC AUTO-ENTMCNC: 35.4 G/DL (ref 31.4–35)
MCV RBC AUTO: 99.5 FL (ref 79.6–97.8)
MONOCYTES # BLD: 0.6 K/UL (ref 0.1–1.3)
MONOCYTES NFR BLD: 8 % (ref 4–12)
NEUTS SEG # BLD: 4.8 K/UL (ref 1.7–8.2)
NEUTS SEG NFR BLD: 59 % (ref 43–78)
NRBC # BLD: 0 K/UL (ref 0–0.2)
PLATELET # BLD AUTO: 270 K/UL (ref 150–450)
PMV BLD AUTO: 10.9 FL (ref 9.4–12.3)
RBC # BLD AUTO: 4.43 M/UL (ref 4.23–5.6)
WBC # BLD AUTO: 8.1 K/UL (ref 4.3–11.1)

## 2022-08-18 PROCEDURE — G8427 DOCREV CUR MEDS BY ELIG CLIN: HCPCS | Performed by: NURSE PRACTITIONER

## 2022-08-18 PROCEDURE — 96372 THER/PROPH/DIAG INJ SC/IM: CPT | Performed by: NURSE PRACTITIONER

## 2022-08-18 PROCEDURE — 1036F TOBACCO NON-USER: CPT | Performed by: NURSE PRACTITIONER

## 2022-08-18 PROCEDURE — G8417 CALC BMI ABV UP PARAM F/U: HCPCS | Performed by: NURSE PRACTITIONER

## 2022-08-18 PROCEDURE — 99214 OFFICE O/P EST MOD 30 MIN: CPT | Performed by: NURSE PRACTITIONER

## 2022-08-18 PROCEDURE — 1123F ACP DISCUSS/DSCN MKR DOCD: CPT | Performed by: NURSE PRACTITIONER

## 2022-08-18 RX ORDER — TRIAMCINOLONE ACETONIDE 40 MG/ML
40 INJECTION, SUSPENSION INTRA-ARTICULAR; INTRAMUSCULAR ONCE
Status: COMPLETED | OUTPATIENT
Start: 2022-08-18 | End: 2022-08-18

## 2022-08-18 RX ORDER — TIZANIDINE 2 MG/1
2 TABLET ORAL 3 TIMES DAILY PRN
Qty: 30 TABLET | Refills: 0 | Status: SHIPPED | OUTPATIENT
Start: 2022-08-18 | End: 2022-08-18 | Stop reason: SINTOL

## 2022-08-18 RX ORDER — BACLOFEN 10 MG/1
5 TABLET ORAL NIGHTLY PRN
Qty: 15 TABLET | Refills: 0 | Status: SHIPPED | OUTPATIENT
Start: 2022-08-18

## 2022-08-18 RX ORDER — CIPROFLOXACIN 500 MG/1
500 TABLET, FILM COATED ORAL 2 TIMES DAILY
Qty: 14 TABLET | Refills: 0 | Status: SHIPPED | OUTPATIENT
Start: 2022-08-18 | End: 2022-08-25

## 2022-08-18 RX ADMIN — TRIAMCINOLONE ACETONIDE 40 MG: 40 INJECTION, SUSPENSION INTRA-ARTICULAR; INTRAMUSCULAR at 15:00

## 2022-08-18 ASSESSMENT — ENCOUNTER SYMPTOMS
ABDOMINAL PAIN: 1
VOMITING: 0
BLOOD IN STOOL: 0
DIARRHEA: 1
NAUSEA: 0
CONSTIPATION: 0

## 2022-08-18 ASSESSMENT — PATIENT HEALTH QUESTIONNAIRE - PHQ9
SUM OF ALL RESPONSES TO PHQ QUESTIONS 1-9: 0
2. FEELING DOWN, DEPRESSED OR HOPELESS: 0
1. LITTLE INTEREST OR PLEASURE IN DOING THINGS: 0
SUM OF ALL RESPONSES TO PHQ QUESTIONS 1-9: 0
SUM OF ALL RESPONSES TO PHQ QUESTIONS 1-9: 0
SUM OF ALL RESPONSES TO PHQ9 QUESTIONS 1 & 2: 0
SUM OF ALL RESPONSES TO PHQ QUESTIONS 1-9: 0

## 2022-08-18 NOTE — PROGRESS NOTES
8/18/2022 2:36 PM  Location:[facility] [address] [facility]  [place]  Patient #:  [id number]  YOB: 1945          YOUR LAST HEMOGLOBIN A1CS:   No results found for: HBA1C, [id number]    YOUR LAST LIPID PROFILE:   Lab Results   Component Value Date/Time    CHOL 133 04/01/2021 04:17 AM    HDL 52 04/01/2021 04:17 AM         Lab Results   Component Value Date/Time    GFRAA 89 12/01/2021 03:00 PM    BUN 11 12/01/2021 03:00 PM     12/01/2021 03:00 PM    K 4.5 12/01/2021 03:00 PM     12/01/2021 03:00 PM    CO2 23 12/01/2021 03:00 PM           History of Present Illness     Chief Complaint   Patient presents with    Follow-Up from [facility]: ER: 8/12/22: gastritis  Watery bowels  Sleeps a lot        Mr. Rosi Salcedo is a 64 y.o. male  who presents for ER follow up. Mr. Rosi Salcedo presents for ER follow-up. He has a history of colitis on Dipentum and arthritis on Celebrex. Notes that he developed abdominal pain which was dull and nonradiating last Friday. He also has had several months of worsening gradual neck pain without any history of falls or neck surgery. In the emergency department labs including lipase, amylase, LFTs, CBC, CMP and urine were all normal.  A CT of his spine showed cervical spondylolysis. CT of his chest abdomen pelvis showed colitis. He was given 1 dose of Cipro and some steroids which did help his symptoms. He notes that since his discharge he has had gradual return of a discomfort in his abdomen. He initially reports some diarrhea which has transition to firm stools. He denies any melena or hematochezia, nausea or vomiting, fevers or chills. He reports going to the chiropractor with no relief, states that his neck is cracking and popping and has difficulty with movement to the left and right. 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 Tylenol has not been helpful in addition to his Celebrex.   He states that he was told to stop taking his Celebrex because he has had a recent GI bleed as well. CT CAP:  IMPRESSION:  1. MILD LONG SEGMENT TRANSVERSE COLON WALL THICKENING VERSUS UNDERDISTENTION. CORRELATE CLINICALLY FOR COLITIS. 2.  URINARY BLADDER WALL THICKENING. CORRELATE WITH URINALYSIS. 3.  NO ACUTE FINDINGS IN THE CHEST. CT CERVICAL SPINE:  FINDINGS:     Alignment: No acute traumatic malalignment. Craniocervical junction: No evidence of acute fracture or dislocation. Vertebrae: No acute fractures. Vertebral body heights maintained. Spinal canal: No gross upper cervical canal hematoma. Degenerative changes: Moderate to advanced multilevel cervical spondylosis. This  is most pronounced at C4-C5, C5-C6, and C6-C7. This results in varying degrees  of canal and foraminal stenosis which are suboptimally evaluated on CT. IMPRESSION:    NO ACUTE FRACTURE OR SUBLUXATION.      MODERATE TO ADVANCED CERVICAL SPONDYLOSIS         Allergies   Allergen Reactions    Latex Other (See Comments)     \"it burns\"      Past Medical History:   Diagnosis Date    Acute pancreatitis 8/17/2015    Allergic rhinitis 12/6/2015    Arthritis     OA    Backache, unspecified 8/17/2015    Benign neoplasm of colon 08/17/2015    hx    Benign neoplasm of other specified sites of skin 8/17/2015    Benign non-nodular prostatic hyperplasia with lower urinary tract symptoms 8/17/2015    Cervical arthritis 5/30/2017    Chronic sinusitis 8/17/2015    Depressive disorder, not elsewhere classified 8/17/2015    Deviated nasal septum 9/20/2016    Esophageal reflux 8/17/2015    Family history of sudden cardiac death     GERD (gastroesophageal reflux disease)     controlled w/med    History of COVID-19 03/2020    \"flu like symptoms\", HA    Hypertrophy of prostate with urinary obstruction and other lower urinary tract symptoms (LUTS) 08/17/2015    managed with medication, followed by PCP Dr. Coco Knott     Mixed hearing loss 09/20/2016    bilateral hearing aids     Osteoarthritis of multiple joints 8/22/2018    Other and unspecified noninfectious gastroenteritis and colitis(558.9) 08/17/2015    Other malaise and fatigue 8/17/2015    Other urethral bulbous stricture, male 12/28/2018    Peripheral neuropathy 8/17/2015    Primary osteoarthritis of knee 8/3/2016    Sinus problem     SNHL (sensorineural hearing loss) 9/20/2016    TIA (transient ischemic attack) 03/31/2021    denies residual effects    Tinnitus 9/20/2016    Ulcerative colitis, unspecified     Ulcerative colitis, unspecified 08/17/2015    Unspecified hearing loss 08/17/2015    hearing aids bilat    Unspecified hemorrhoids with other complication 3/95/9611    Unspecified hereditary and idiopathic peripheral neuropathy 08/17/2015    bilat LE d/t spinal arthritis    Urinary frequency 8/17/2015     Social History     Socioeconomic History    Marital status:      Spouse name: None    Number of children: None    Years of education: None    Highest education level: None   Tobacco Use    Smoking status: Never    Smokeless tobacco: Never   Substance and Sexual Activity    Alcohol use: Yes     Alcohol/week: 6.0 standard drinks    Drug use: No   Social History Narrative     and lives with wife. Retired. Previously served in [employer].      Social Determinants of Health     Financial Resource Strain: Low Risk     Difficulty of Paying Living Expenses: Not hard at all   Food Insecurity: No Food Insecurity    Worried About [address] [employer] 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 Ascorbic Acid (VITAMIN C) 250 MG tablet Take 250 mg by mouth daily      zinc gluconate 50 MG tablet Take 50 mg by mouth daily      pantoprazole (PROTONIX) 40 MG tablet Take 1 tablet by mouth daily 90 tablet 1    ascorbic acid (VITAMIN C) 500 MG tablet Take by mouth      cyanocobalamin 100 MCG tablet Take 100 mcg by mouth daily      dicyclomine (BENTYL) 10 MG capsule Dicyclomine 10 mg capsule      diflorasone (PSORCON) 0.05 % ointment Apply topically 2 times daily as needed      fluocinolone (SYNALAR) 0.025 % cream Apply topically 2 times daily      ketoconazole (NIZORAL) 2 % cream Apply topically 2 times daily as needed      Omega-3 Fatty Acids (FISH OIL) 1000 MG CAPS Take 1 capsule by mouth      potassium gluconate 550 mg tablet Take 99 mg by mouth daily as needed      tamsulosin (FLOMAX) 0.4 MG capsule TAKE 1 CAPSULE DAILY      thiamine 100 MG tablet Take by mouth daily      albuterol sulfate  (90 Base) MCG/ACT inhaler INHALE 2 PUFFS BY MOUTH EVERY 6 HOURS AS NEEDED FOR WHEEZING (Patient not taking: Reported on 8/18/2022)      aspirin 81 MG chewable tablet Take 81 mg by mouth every other day (Patient not taking: Reported on 8/18/2022)      cyclobenzaprine (FLEXERIL) 5 MG tablet TAKE 1 TABLET BY MOUTH THREE TIMES DAILY AS NEEDED FOR MUSCLE SPASMS (Patient not taking: Reported on 8/18/2022)      finasteride (PROSCAR) 5 MG tablet Take 5 mg by mouth daily (Patient not taking: Reported on 8/18/2022)      tolterodine (DETROL LA) 2 MG extended release capsule Take 4 mg by mouth daily (Patient not taking: Reported on 8/18/2022)      traMADol (ULTRAM) 50 MG tablet Take 50 mg by mouth every 6 hours as needed. (Patient not taking: No sig reported)       No current facility-administered medications for this visit.      Health Maintenance   Topic Date Palpations: There is no mass. Tenderness: There is no abdominal tenderness. Musculoskeletal:      Cervical back: Tenderness present. No bony tenderness or crepitus. Pain with movement present. No muscular tenderness. Right lower leg: No edema. Left lower leg: No edema. Neurological:      Mental Status: He is oriented to person, place, and time. Assessment & Plan         Current Outpatient Medications   Medication Sig Dispense Refill    DIPENTUM 250 MG capsule TAKE 2 CAPSULES DAILY 180 capsule 3    NONFORMULARY Turmeric, ging, olive, oreg, capr [phone]- capsule      NONFORMULARY Osteo biflex.       Ascorbic Acid (VITAMIN C) 250 MG tablet Take 250 mg by mouth daily      zinc gluconate 50 MG tablet Take 50 mg by mouth daily      pantoprazole (PROTONIX) 40 MG tablet Take 1 tablet by mouth daily 90 tablet 1    ascorbic acid (VITAMIN C) 500 MG tablet Take by mouth      cyanocobalamin 100 MCG tablet Take 100 mcg by mouth daily      dicyclomine (BENTYL) 10 MG capsule Dicyclomine 10 mg capsule      diflorasone (PSORCON) 0.05 % ointment Apply topically 2 times daily as needed      fluocinolone (SYNALAR) 0.025 % cream Apply topically 2 times daily      ketoconazole (NIZORAL) 2 % cream Apply topically 2 times daily as needed      Omega-3 Fatty Acids (FISH OIL) 1000 MG CAPS Take 1 capsule by mouth      potassium gluconate 550 mg tablet Take 99 mg by mouth daily as needed      tamsulosin (FLOMAX) 0.4 MG capsule TAKE 1 CAPSULE DAILY      thiamine 100 MG tablet Take by mouth daily      albuterol sulfate  (90 Base) MCG/ACT inhaler INHALE 2 PUFFS BY MOUTH EVERY 6 HOURS AS NEEDED FOR WHEEZING (Patient not taking: Reported on 8/18/2022)      aspirin 81 MG chewable tablet Take 81 mg by mouth every other day (Patient not taking: Reported on 8/18/2022)      cyclobenzaprine (FLEXERIL) 5 MG tablet TAKE 1 TABLET BY MOUTH THREE TIMES DAILY AS NEEDED FOR MUSCLE SPASMS (Patient not taking: Reported on

## 2022-08-18 NOTE — TELEPHONE ENCOUNTER
Renée is calling regarding a drug interaction for this pt. Pt was prescribed Cipro and Tizanidine. The pharmacist stated that Cipro causes an increase in Tizanidine by 8 fold. He is calling to see if one of the medications can be changed. Please call the pharmacy at [phone].

## 2022-08-18 NOTE — TELEPHONE ENCOUNTER
Mason Click 16 minutes ago (3:03 PM)         Jorgito Wyatt is calling regarding a drug interaction for this pt. Pt was prescribed Cipro and Tizanidine. The pharmacist stated that Cipro causes an increase in Tizanidine by 8 fold. He is calling to see if one of the medications can be changed. Please call the pharmacy at [phone].

## 2022-08-19 ENCOUNTER — TELEPHONE (OUTPATIENT)
Dept: INTERNAL MEDICINE CLINIC | Facility: CLINIC | Age: 77
End: 2022-08-19

## 2022-08-19 LAB
ALBUMIN SERPL-MCNC: 3.8 G/DL (ref 3.2–4.6)
ALBUMIN/GLOB SERPL: 1.3 {RATIO} (ref 1.2–3.5)
ALP SERPL-CCNC: 69 U/L (ref 50–136)
ALT SERPL-CCNC: 43 U/L (ref 12–65)
ANION GAP SERPL CALC-SCNC: 5 MMOL/L (ref 7–16)
AST SERPL-CCNC: 25 U/L (ref 15–37)
BILIRUB SERPL-MCNC: 0.3 MG/DL (ref 0.2–1.1)
BUN SERPL-MCNC: 14 MG/DL (ref 8–23)
CALCIUM SERPL-MCNC: 9 MG/DL (ref 8.3–10.4)
CHLORIDE SERPL-SCNC: 110 MMOL/L (ref 98–107)
CO2 SERPL-SCNC: 24 MMOL/L (ref 21–32)
CREAT SERPL-MCNC: 1 MG/DL (ref 0.8–1.5)
GLOBULIN SER CALC-MCNC: 3 G/DL (ref 2.3–3.5)
GLUCOSE SERPL-MCNC: 97 MG/DL (ref 65–100)
POTASSIUM SERPL-SCNC: 4 MMOL/L (ref 3.5–5.1)
PROT SERPL-MCNC: 6.8 G/DL (ref 6.3–8.2)
SODIUM SERPL-SCNC: 139 MMOL/L (ref 136–145)

## 2022-08-19 NOTE — TELEPHONE ENCOUNTER
Mr. Moreno Oh-  Your labs are all stable. Please let me know if you have any new or worsening symptoms. I am hoping you feel better soon!   Pamela

## 2022-08-24 ENCOUNTER — OFFICE VISIT (OUTPATIENT)
Dept: ORTHOPEDIC SURGERY | Age: 77
End: 2022-08-24
Payer: MEDICARE

## 2022-08-24 VITALS — BODY MASS INDEX: 36.4 KG/M2 | HEIGHT: 64 IN | WEIGHT: 213.2 LBS

## 2022-08-24 DIAGNOSIS — M47.812 ARTHROPATHY OF CERVICAL FACET JOINT: ICD-10-CM

## 2022-08-24 DIAGNOSIS — M50.30 DDD (DEGENERATIVE DISC DISEASE), CERVICAL: ICD-10-CM

## 2022-08-24 DIAGNOSIS — M54.2 NECK PAIN: Primary | ICD-10-CM

## 2022-08-24 DIAGNOSIS — M25.519 STERNOCLAVICULAR JOINT PAIN, UNSPECIFIED LATERALITY: ICD-10-CM

## 2022-08-24 PROCEDURE — 99204 OFFICE O/P NEW MOD 45 MIN: CPT | Performed by: NURSE PRACTITIONER

## 2022-08-24 PROCEDURE — G8428 CUR MEDS NOT DOCUMENT: HCPCS | Performed by: NURSE PRACTITIONER

## 2022-08-24 PROCEDURE — 1036F TOBACCO NON-USER: CPT | Performed by: NURSE PRACTITIONER

## 2022-08-24 PROCEDURE — G8417 CALC BMI ABV UP PARAM F/U: HCPCS | Performed by: NURSE PRACTITIONER

## 2022-08-24 PROCEDURE — 1123F ACP DISCUSS/DSCN MKR DOCD: CPT | Performed by: NURSE PRACTITIONER

## 2022-08-24 RX ORDER — TIZANIDINE 2 MG/1
2 TABLET ORAL NIGHTLY PRN
Qty: 30 TABLET | Refills: 0 | Status: SHIPPED | OUTPATIENT
Start: 2022-08-24

## 2022-08-24 RX ORDER — FAMOTIDINE 40 MG/1
TABLET, FILM COATED ORAL
COMMUNITY
Start: 2022-07-11

## 2022-08-24 NOTE — PROGRESS NOTES
tablet, Rfl: 0    DIPENTUM 250 MG capsule, TAKE 2 CAPSULES DAILY, Disp: 180 capsule, Rfl: 3    NONFORMULARY, Turmeric, ging, olive, oreg, capr [phone]- capsule, Disp: , Rfl:     NONFORMULARY, Osteo biflex. , Disp: , Rfl:     Ascorbic Acid (VITAMIN C) 250 MG tablet, Take 250 mg by mouth daily, Disp: , Rfl:     zinc gluconate 50 MG tablet, Take 50 mg by mouth daily, Disp: , Rfl:     pantoprazole (PROTONIX) 40 MG tablet, Take 1 tablet by mouth daily, Disp: 90 tablet, Rfl: 1    cyanocobalamin 100 MCG tablet, Take 100 mcg by mouth daily, Disp: , Rfl:     diflorasone (PSORCON) 0.05 % ointment, Apply topically 2 times daily as needed, Disp: , Rfl:     fluocinolone (SYNALAR) 0.025 % cream, Apply topically 2 times daily, Disp: , Rfl:     ketoconazole (NIZORAL) 2 % cream, Apply topically 2 times daily as needed, Disp: , Rfl:     Omega-3 Fatty Acids (FISH OIL) 1000 MG CAPS, Take 1 capsule by mouth, Disp: , Rfl:     potassium gluconate 550 mg tablet, Take 99 mg by mouth daily as needed, Disp: , Rfl:     tamsulosin (FLOMAX) 0.4 MG capsule, TAKE 1 CAPSULE DAILY, Disp: , Rfl:     B Complex Vitamins (B COMPLEX 1 PO), Take by mouth daily, Disp: , Rfl:     ascorbic acid (VITAMIN C) 500 MG tablet, Take by mouth (Patient not taking: Reported on 8/24/2022), Disp: , Rfl:     dicyclomine (BENTYL) 10 MG capsule, Dicyclomine 10 mg capsule, Disp: , Rfl:     thiamine 100 MG tablet, Take by mouth daily, Disp: , Rfl:     Past Medical History:   Diagnosis Date    Acute pancreatitis 8/17/2015    Allergic rhinitis 12/6/2015    Arthritis     OA    Backache, unspecified 8/17/2015    Benign neoplasm of colon 08/17/2015    hx    Benign neoplasm of other specified sites of skin 8/17/2015    Benign non-nodular prostatic hyperplasia with lower urinary tract symptoms 8/17/2015    Cervical arthritis 5/30/2017    Chronic sinusitis 8/17/2015    Depressive disorder, not elsewhere classified 8/17/2015    Deviated nasal septum 9/20/2016    Esophageal reflux 8/17/2015    Family history of sudden cardiac death     GERD (gastroesophageal reflux disease)     controlled w/med    History of COVID-19 03/2020    \"flu like symptoms\", HA    Hypertrophy of prostate with urinary obstruction and other lower urinary tract symptoms (LUTS) 08/17/2015    managed with medication, followed by PCP Dr. Corina Benz     Mixed hearing loss 09/20/2016    bilateral hearing aids     Osteoarthritis of multiple joints 8/22/2018    Other and unspecified noninfectious gastroenteritis and colitis(558.9) 08/17/2015    Other malaise and fatigue 8/17/2015    Other urethral bulbous stricture, male 12/28/2018    Peripheral neuropathy 8/17/2015    Primary osteoarthritis of knee 8/3/2016    Sinus problem     SNHL (sensorineural hearing loss) 9/20/2016    TIA (transient ischemic attack) 03/31/2021    denies residual effects    Tinnitus 9/20/2016    Ulcerative colitis, unspecified     Ulcerative colitis, unspecified 08/17/2015    Unspecified hearing loss 08/17/2015    hearing aids bilat    Unspecified hemorrhoids with other complication 8/26/4694    Unspecified hereditary and idiopathic peripheral neuropathy 08/17/2015    bilat LE d/t spinal arthritis    Urinary frequency 8/17/2015       Tobacco:  reports that he has never smoked. He has never used smokeless tobacco.  Alcohol:   Social History     Substance and Sexual Activity   Alcohol Use Yes    Alcohol/week: 6.0 standard drinks        Physical Exam:     GENERAL:  Adult in no acute distress, well developed, well nourished . Patient is appropriately conversant  CERVICAL SPINE:  Inspection of the neck reveals no evidence of rash or skin lesion. Examination of the cervical spine reveals no evidence of sagittal or coronal plane deformity, decreased ROM, and palpable tenderness  Spurling's sign painful but negative side for reproduction of radicular symptoms. Gait does not suggest myelopathy.     Sensory testing reveals intact sensation to light touch in the distribution of the C5-T1 dermatomes bilaterally. Reflexes     Right Left   Biceps (C5) 2 2   Brachio radialis (C6) 2 2    Triceps (C7) 2 2     Otoole's is negative    Ankle jerk is negative   Finger escape test is negative  Inverted radial reflex is negative    Tinel's and Andrew testing over the cubital and carpal tunnels do not reproduce the symptoms. Shoulder examination is not consistent with adhesive capsulitis or acute rotator cuff tendinitis. The patient does not have difficulty with rapid alternating hand movements. Strength testing in the upper extremity reveals the following based on the 5 point grading scale:     Delt(C5) Bicep(C6) WE(C6) Tricep (C7) WF(C7) (C8) Int (T1)   Right 5 5 5 5 5 5 5   Left 5 5 5 5 5 5 5     Pulses are palpable over bilateral radial arteries. Radiographic Studies:    Xrays including AP and lateral views of the cervical spine reviewed today: There is loss of lordosis. There is a kyphotic curvature at C5-6  There is severe disc degeneration from C4-C7. Impression: Severe cervical spondylosis from C4-C7    CT scan of the cervical spine reviewed in HPI. Multilevel advanced degenerative disc disease most prominent from C4-C7 with varying degrees of foraminal stenosis. Assessment/Plan:      ICD-10-CM    1. Neck pain  M54.2 XR CERVICAL SPINE (2-3 VIEWS)     CANCELED: XR CERVICAL SPINE (2-3 VIEWS)      2. DDD (degenerative disc disease), cervical  M50.30 MRI CERVICAL SPINE WO CONTRAST      3. Arthropathy of cervical facet joint  M47.812 MRI CERVICAL SPINE WO CONTRAST      4. Sternoclavicular joint pain, unspecified laterality  M25.519            This patient's clinical history and physical exam is consistent with spondylotic neck pain. Patient has multilevel degenerative disc disease and spondylosis. Sounds as though his degeneration is triggering headaches. I encouraged patient to take Tylenol and we will start him on tizanidine.   I cannot quite explain the sternal pain related to his cervical spine but we will go ahead and obtain an MRI of the cervical spine for further investigation. I discussed the natural history of this condition with the patient in that often these patients will experience diminishing of their symptoms within several weeks of conservative care. We also discussed that the typical conservative treatments available include rest, NSAIDs, muscle relaxants, and physical therapy as symptoms allow. Oral and/or injectable steroids are other options to consider. Some consideration could be given to a soft collar for a short period of time. However, I would avoid using this for extended periods because of further deconditioning of the paracervical musculature. - Muscle relaxant. The patient was prescribed a muscle relaxant to reduce neck pain. Side effects were discussed including drowsiness, fatigue, and depression. -MRI: A cervical MRI was ordered to confirm the diagnosis and assess the severity. Orders Placed This Encounter   Medications    tiZANidine (ZANAFLEX) 2 MG tablet     Sig: Take 1 tablet by mouth nightly as needed (neck pain)     Dispense:  30 tablet     Refill:  0        Orders Placed This Encounter   Procedures    XR CERVICAL SPINE (2-3 VIEWS)    MRI CERVICAL SPINE WO CONTRAST        4 This is a chronic illness/condition with exacerbation and progression      Return for MRI results. NICK Modi - CNP  09/06/22      Elements of this note were created using speech recognition software. As such, errors of speech recognition may be present.

## 2022-09-06 ENCOUNTER — OFFICE VISIT (OUTPATIENT)
Dept: ORTHOPEDIC SURGERY | Age: 77
End: 2022-09-06
Payer: MEDICARE

## 2022-09-06 DIAGNOSIS — M48.02 CERVICAL STENOSIS OF SPINAL CANAL: Primary | ICD-10-CM

## 2022-09-06 DIAGNOSIS — M47.812 ARTHROPATHY OF CERVICAL FACET JOINT: ICD-10-CM

## 2022-09-06 PROCEDURE — G8417 CALC BMI ABV UP PARAM F/U: HCPCS | Performed by: NURSE PRACTITIONER

## 2022-09-06 PROCEDURE — 1036F TOBACCO NON-USER: CPT | Performed by: NURSE PRACTITIONER

## 2022-09-06 PROCEDURE — 1123F ACP DISCUSS/DSCN MKR DOCD: CPT | Performed by: NURSE PRACTITIONER

## 2022-09-06 PROCEDURE — G8428 CUR MEDS NOT DOCUMENT: HCPCS | Performed by: NURSE PRACTITIONER

## 2022-09-06 PROCEDURE — 99213 OFFICE O/P EST LOW 20 MIN: CPT | Performed by: NURSE PRACTITIONER

## 2022-09-06 NOTE — PROGRESS NOTES
Name: Jessica Cooper  YOB: 1945  Gender: male  MRN: [id number]    CC: Follow-up neck pain    HPI: This is a 76y.o. year old male who has had a multiyear history of neck pain. Recently he has had increased complaints. 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 He denies any radicular pain but has pain on the trapezius muscles can radiate around to the anterior chest at times and can go and cause headaches. CT scan of the cervical spine revealed multilevel advanced cervical spondylosis and degenerative disc disease along with facet arthropathy. Patient has tried baclofen, tizanidine, steroids. He has had previous epidurals years ago. At last visit I referred him for an MRI of the cervical spine. AMB PAIN ASSESSMENT 8/24/2022   Location of Pain Shoulder   Location Modifiers Right   Severity of Pain 4   Frequency of Pain Constant   Limiting Behavior Yes   Result of Injury No   Work-Related Injury No   Are there other pain locations you wish to document? No        Allergies   Allergen Reactions    Latex Other (See Comments)     \"it burns\"        Current Outpatient Medications:     B Complex Vitamins (B COMPLEX 1 PO), Take by mouth daily, Disp: , Rfl:     famotidine (PEPCID) 40 MG tablet, , Disp: , Rfl:     tiZANidine (ZANAFLEX) 2 MG tablet, Take 1 tablet by mouth nightly as needed (neck pain), Disp: 30 tablet, Rfl: 0    baclofen (LIORESAL) 10 MG tablet, Take 0.5 tablets by mouth nightly as needed (muscle spasms), Disp: 15 tablet, Rfl: 0    DIPENTUM 250 MG capsule, TAKE 2 CAPSULES DAILY, Disp: 180 capsule, Rfl: 3    NONFORMULARY, Turmeric, ging, olive, oreg, capr [id number]- capsule, Disp: , Rfl:     NONFORMULARY, Osteo biflex. , Disp: , Rfl:     Ascorbic Acid (VITAMIN C) 250 MG tablet, Take 250 mg by mouth daily, Disp: , Rfl:     zinc gluconate 50 MG tablet, Take 50 mg by mouth daily, Disp: , Rfl:     pantoprazole (PROTONIX) 40 MG tablet, Take 1 tablet by mouth daily, Disp: 90 tablet, Rfl: 1    ascorbic acid (VITAMIN C) 500 MG tablet, Take by mouth (Patient not taking: Reported on 8/24/2022), Disp: , Rfl:     cyanocobalamin 100 MCG tablet, Take 100 mcg by mouth daily, Disp: , Rfl:     dicyclomine (BENTYL) 10 MG capsule, Dicyclomine 10 mg capsule, Disp: , Rfl:     diflorasone (PSORCON) 0.05 % ointment, Apply topically 2 times daily as needed, Disp: , Rfl:     fluocinolone (SYNALAR) 0.025 % cream, Apply topically 2 times daily, Disp: , Rfl:     ketoconazole (NIZORAL) 2 % cream, Apply topically 2 times daily as needed, Disp: , Rfl:     Omega-3 Fatty Acids (FISH OIL) 1000 MG CAPS, Take 1 capsule by mouth, Disp: , Rfl:     potassium gluconate 550 mg tablet, Take 99 mg by mouth daily as needed, Disp: , Rfl:     tamsulosin (FLOMAX) 0.4 MG capsule, TAKE 1 CAPSULE DAILY, Disp: , Rfl:     thiamine 100 MG tablet, Take by mouth daily, Disp: , Rfl:   Past Medical History:   Diagnosis Date    Acute pancreatitis 8/17/2015    Allergic rhinitis 12/6/2015    Arthritis     OA    Backache, unspecified 8/17/2015    Benign neoplasm of colon 08/17/2015    hx    Benign neoplasm of other specified sites of skin 8/17/2015    Benign non-nodular prostatic hyperplasia with lower urinary tract symptoms 8/17/2015    Cervical arthritis 5/30/2017    Chronic sinusitis 8/17/2015    Depressive disorder, not elsewhere classified 8/17/2015    Deviated nasal septum 9/20/2016    Esophageal reflux 8/17/2015    Family history of sudden cardiac death     GERD (gastroesophageal reflux disease)     controlled w/med    History of COVID-19 03/2020    \"flu like symptoms\", HA    Hypertrophy of prostate with urinary obstruction and other lower urinary tract symptoms (LUTS) 08/17/2015    managed with medication, followed by PCP Dr. Marni Xiong     Mixed hearing loss 09/20/2016    bilateral hearing aids     Osteoarthritis of multiple joints 8/22/2018    Other and unspecified noninfectious gastroenteritis and colitis(558.9) 08/17/2015    Other malaise and fatigue 8/17/2015    Other urethral bulbous stricture, male 12/28/2018    Peripheral neuropathy 8/17/2015    Primary osteoarthritis of knee 8/3/2016    Sinus problem     SNHL (sensorineural hearing loss) 9/20/2016    TIA (transient ischemic attack) 03/31/2021    denies residual effects    Tinnitus 9/20/2016    Ulcerative colitis, unspecified     Ulcerative colitis, unspecified 08/17/2015    Unspecified hearing loss 08/17/2015    hearing aids bilat    Unspecified hemorrhoids with other complication 9/51/4323    Unspecified hereditary and idiopathic peripheral neuropathy 08/17/2015    bilat LE d/t spinal arthritis    Urinary frequency 8/17/2015     Tobacco:  reports that he has never smoked. He has never used smokeless tobacco.  Alcohol:   Social History     Substance and Sexual Activity   Alcohol Use Yes    Alcohol/week: 6.0 standard drinks        Radiographic Studies:   X-rays revealed loss of cervical lordosis with kyphosis at C6-7. Multilevel degenerative disc disease from C4-C7. MRI Results (most recent):    Findings:        Degenerative disc signal present at all included levels with multilevel disc   osteophyte complex formation. There is normal signal in the cervical spinal cord   throughout. Limited evaluation of the posterior fossa, clivus, and   craniocervical junction is normal.           C2-3: There is a broad-based disc osteophyte complex with bilateral facet   arthropathy. There is severe left neural foraminal narrowing and mild spinal   stenosis. C3-4: There is a broad-based disc osteophyte complex with bilateral facet   arthropathy. There is mild bilateral neural foraminal narrowing. C4-5: There is a broad-based disc osteophyte complex with bilateral facet   arthropathy. There is moderate spinal stenosis and severe bilateral neural   foraminal narrowing. C5-6: There is a broad-based disc osteophyte complex with bilateral facet   arthropathy.  There is severe bilateral neural foraminal narrowing. No spinal   stenosis. C6-7: There is a broad-based disc osteophyte complex with bilateral facet   arthropathy. There is mild spinal stenosis and moderate right neural foraminal   narrowing. There is mild left neural foraminal narrowing. C7-T1: No spinal canal or neural foraminal narrowing. T1-T2: There is a broad-based disc osteophyte complex at this level with   moderate bilateral neural foraminal narrowing. No spinal stenosis. Impression   Impression: Advanced multilevel cervical spondylosis as detailed level by level   above. Assessment/Plan:        ICD-10-CM    1. Cervical stenosis of spinal canal  M48.02 Amb External Referral To Pain Medicine      2. Arthropathy of cervical facet joint  M47.812 Amb External Referral To Pain Medicine      Reviewed patient's MRI findings with him in detail discussed the path of physiology. I discussed with him that he does have moderate spinal stenosis at 1 level and varying degrees of foraminal stenosis. But he has no radicular symptoms. His pain is all neck pain. We discussed that stenosis can drive neck pain but also can cervical facet arthropathy. Given this finding I would recommend patient undergo a combination of a cervical epidural injection followed by treatment of his cervical facet arthropathy. He can see which injection works better or if the combination of them helps him maintain his neck pain complaints. I will refer him back to [facility]. Again I would like patient to undergo a combination of epidurals and facet treatment.    -Referral to pain management: The patient's care would be best managed in a formal pain management setting and will be referred accordingly. No orders of the defined types were placed in this encounter.        Orders Placed This Encounter   Procedures    Amb External Referral To Pain Medicine        3 This is stable chronic illness/condition    Return for refer to pain management- [facility]. Jenene Homans, APRN - CNP  09/06/22      Elements of this note were created using speech recognition software. As such, errors of speech recognition may be present.

## 2022-10-22 NOTE — CONSULTS
Patient: Eloy Gaytan Date: 10/22/2022   : 1958 Attending: Marcela Barbour MD   64 year old male        Chief complaint:readmittd s/p fall at home. Labs reveal ARF.    Acute renal failure superimposed on chronic kidney disease, unspecified CKD stage, unspecified acute renal failure type (CMS/HCC)  (primary encounter diagnosis)  Ischemic cardiomyopathy  Dizziness  Strain of neck muscle, initial encounter  Epistaxis     Admission HPI: 64 WM well known w/ underlying cardiomyopathy/pulmonary HTN. Recently admitted to hospital w/ CHF exacerbation. Was sent home on PO Bumex/Aldactone. Now readmitted after fall at home. Renal fx quite diminished. Now CXR clear, edema much improved overall. He denies urinary issues. Denies sob at rest. No f/c. No n/v issues. States he is compliant w/ meds.     Subjective: Patient resting in bed. Denies cp and SOB. Cr is better.     ROS: 12 pt ros o/w negative      Social History     Socioeconomic History   • Marital status: Single     Spouse name: Not on file   • Number of children: 0   • Years of education: Not on file   • Highest education level: Not on file   Occupational History   • Not on file   Tobacco Use   • Smoking status: Former Smoker     Packs/day: 0.00     Years: 3.00     Pack years: 0.00     Types: Cigars   • Smokeless tobacco: Former User     Types: Chew   • Tobacco comment: 1 cigar once a week.  1 container of chew per week   Vaping Use   • Vaping Use: never used   Substance and Sexual Activity   • Alcohol use: Yes     Alcohol/week: 5.0 - 10.0 standard drinks     Types: 5 - 10 Cans of beer per week     Comment: nothing in a few months per pt 10/5/22   • Drug use: No   • Sexual activity: Yes   Other Topics Concern   •  Service Not Asked   • Blood Transfusions Not Asked   • Caffeine Concern Not Asked   • Occupational Exposure Not Asked   • Hobby Hazards Not Asked   • Sleep Concern Not Asked   • Stress Concern Not Asked   • Weight Concern Not Asked   •  Consult    Patient: Jazmine Guerrero MRN: [id number]     YOB: 1945  Age: 66 y.o. Sex: male      Subjective:      Jazmine Guerrero is a 66 y.o. male who is being seen for possible TIA. The patient states that he was driving around town and then developed bilateral leg weakness. He states that the weakness was equal on both sides. He also reports slurred speech. Onset of symptoms was acute. Duration of symptoms was 5 hours. Symptoms self resolved. The patient had a CT scan of the head, CTA, CT perfusion, and brain MRI. All studies are unremarkable for significant pathology. He now feels back to his neurological baseline.     Past Medical History:   Diagnosis Date    Acute pancreatitis 8/17/2015    Allergic rhinitis 12/6/2015    Arthritis     OA    Backache, unspecified 8/17/2015    Benign neoplasm of other specified sites of skin 8/17/2015    Benign non-nodular prostatic hyperplasia with lower urinary tract symptoms 8/17/2015    Cervical arthritis 5/30/2017    Chronic sinusitis 8/17/2015    colitis 08/17/2015    COLON POLYPS 08/17/2015    hx    Depressive disorder, not elsewhere classified 8/17/2015    Deviated nasal septum 9/20/2016    Esophageal reflux 8/17/2015    Family history of sudden cardiac death     GERD (gastroesophageal reflux disease)     controlled w/med    Hypertrophy of prostate with urinary obstruction and other lower urinary tract symptoms (LUTS) 08/17/2015    managed with medication, followed by PCP Dr. Lashanda Freitas Mixed hearing loss 09/20/2016    bilateral hearing aids     NEUROPATHY/IDIOPATHIC PERIPHERAL/UNSPEC 08/17/2015    bilat LE d/t spinal arthritis    Osteoarthritis of multiple joints 8/22/2018    Other urethral bulbous stricture, male 12/28/2018    Peripheral neuropathy 8/17/2015    Primary osteoarthritis of knee 8/3/2016    Sinus problem     SNHL (sensorineural hearing loss) 9/20/2016    TIA (transient ischemic attack) 3/31/2021    Tinnitus 9/20/2016    Ulcerative colitis, unspecified 08/17/2015    Ulcerative colitis, unspecified     Unspecified hearing loss 08/17/2015    hearing aids bilat    Unspecified hemorrhoids 8/17/2015    Urinary frequency 8/17/2015    WEAKNESS 8/17/2015     Past Surgical History:   Procedure Laterality Date    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    HX APPENDECTOMY  1955    HX BACK SURGERY  1985    double laminectomy    HX CHOLECYSTECTOMY  2005    HX COLONOSCOPY      HX FRACTURE TX Right 1999    Rt hand, finger    HX FRACTURE TX Left 2004    broken arm/elbow      Family History   Problem Relation Age of Onset    Other Other         Sudden Cardiac Death, colitis, back problems    GERD Other     Cancer Father      Social History     Tobacco Use    Smoking status: Never Smoker    Smokeless tobacco: Never Used   Substance Use Topics    Alcohol use:  Yes     Alcohol/week: 6.0 standard drinks     Types: 6 Standard drinks or equivalent per week     Comment: 6 drinks per week      Current Facility-Administered Medications   Medication Dose Route Frequency Provider Last Rate Last Admin    aspirin chewable tablet 81 mg  81 mg Oral DAILY Lubna Bowling MD   81 mg at 04/01/21 0370    albuterol (PROVENTIL VENTOLIN) nebulizer solution 2.5 mg  2.5 mg Nebulization Q6H PRN Perla Gonzalez MD        finasteride (PROSCAR) tablet 5 mg  5 mg Oral DAILY Sisi Roe Chaudhari MD   5 mg at 04/01/21 0804    budesonide-formoteroL (SYMBICORT) 160-4.5 mcg/actuation HFA inhaler 2 Puff  2 Puff Inhalation BID Perla Gonzalez MD   Stopped at 04/01/21 0840    loratadine (CLARITIN) tablet 10 mg  10 mg Oral DAILY Perla Gonzalez MD   10 mg at 04/01/21 0804    pantoprazole (PROTONIX) tablet 40 mg  40 mg Oral ACB Perla Gonzalez MD   40 mg at 04/01/21 0554    tamsulosin (FLOMAX) capsule 0.4 mg  0.4 mg Oral DAILY Perla Gonzalez MD   0.4 mg at 04/01/21 Special Diet Not Asked   • Back Care Not Asked   • Exercise Yes     Comment: RESISTANCE POOL 2X/WEEK   • Bike Helmet Not Asked   • Seat Belt Not Asked   • Self-Exams Not Asked   Social History Narrative   • Not on file     Social Determinants of Health     Financial Resource Strain: Low Risk    • Social Determinants: Financial Resource Strain: None   Food Insecurity: No Food Insecurity   • Social Determinants: Food Insecurity: Never   Transportation Needs: No Transportation Needs   • Lack of Transportation (Medical): No   • Lack of Transportation (Non-Medical): No   Physical Activity: Not on file   Stress: Not on file   Social Connections: Not on file   Intimate Partner Violence: Not At Risk   • Social Determinants: Intimate Partner Violence Past Fear: No   • Social Determinants: Intimate Partner Violence Current Fear: No          Family History   Problem Relation Age of Onset   • Dementia/Alzheimers Mother    • Diabetes Father    • Other Father         prostate issues   • Diabetes Sister         juvenile   • Cancer Brother         Leukemia   • Patient is unaware of any medical problems Sister             Vital Last Value 24 Hour Range   Temperature 98.4 °F (36.9 °C) Temp  Min: 97.7 °F (36.5 °C)  Max: 98.5 °F (36.9 °C)   Pulse 85 Pulse  Min: 85  Max: 86   Respiratory 18 Resp  Min: 18  Max: 18   Blood Pressure 113/77 BP  Min: 100/65  Max: 113/77   Art BP   No data recorded   Pulse Oximetry 92 % SpO2  Min: 92 %  Max: 95 %     Vital Today Admitted   Weight 100.5 kg (221 lb 9 oz) Weight: 98.3 kg (216 lb 11.4 oz)   Height N/A Height: 6' (182.9 cm)   BMI N/A BMI (Calculated): 29.39     Weight over the past 48 Hours:  Patient Vitals for the past 48 hrs:   Weight   10/21/22 0420 101.9 kg (224 lb 10.4 oz)   10/22/22 0431 100.5 kg (221 lb 9 oz)        Hemodynamics:      Last Value 24 Hour Range   CVP   No data recorded   PAS/PAD   No data recorded   PCWP   No data recorded   CO   No data recorded   CI   No data recorded   SVR    No data recorded   SV02   No data recorded     Intake/Output:    Last Stool Occurrence: 1 (large) (10/21/22 1518)    I/O this shift:  In: -   Out: 350 [Urine:350]    I/O last 3 completed shifts:  In: 700 [P.O.:700]  Out: 1100 [Urine:1100]      Intake/Output Summary (Last 24 hours) at 10/22/2022 1153  Last data filed at 10/22/2022 0739  Gross per 24 hour   Intake 580 ml   Output 1050 ml   Net -470 ml       Medications/Infusions:  Scheduled:   Current Facility-Administered Medications   Medication Dose Route Frequency Provider Last Rate Last Admin   • methIMAzole (TAPAZOLE) tablet 20 mg  20 mg Oral TID Hay Donovan MD   20 mg at 10/21/22 2045   • predniSONE (DELTASONE) tablet 20 mg  20 mg Oral BID WC Hay Donovan MD   20 mg at 10/22/22 1112   • apixaBAN (ELIQUIS) tablet 2.5 mg  2.5 mg Oral Q12H Elvin ASHA Bledsoe MD   2.5 mg at 10/21/22 2348   • famotidine (PEPCID) tablet 20 mg  20 mg Oral Daily Rashad Manjarrez MD   20 mg at 10/22/22 1112   • allopurinol (ZYLOPRIM) tablet 100 mg  100 mg Oral Daily Elvin ASHA Bledsoe MD   100 mg at 10/22/22 1112   • atorvastatin (LIPITOR) tablet 80 mg  80 mg Oral Nightly Elvin ASHA Bledsoe MD   80 mg at 10/21/22 2045   • insulin lispro (ADMELOG,HumaLOG) - Correction Dose   Subcutaneous 4x Daily AC & HS Elvin ASHA Bledsoe MD   2 Units at 10/22/22 1112   • AMIODarone (PACERONE) tablet 200 mg  200 mg Oral BID Rashad Manjarrez MD   200 mg at 10/22/22 1114   • clopidogrel (PLAVIX) tablet 75 mg  75 mg Oral Daily Rashad Manjarrez MD   75 mg at 10/22/22 1112   • escitalopram (LEXAPRO) tablet 20 mg  20 mg Oral Daily Rashad Manjarrez MD   20 mg at 10/22/22 1112   • gabapentin (NEURONTIN) capsule 300 mg  300 mg Oral Nightly Rashad Manjarrez MD   300 mg at 10/21/22 2045   • insulin glargine (LANTUS) injection 20 Units  20 Units Subcutaneous Nightly Rashad Manjarrez MD   20 Units at 10/21/22 2153   • isosorbide dinitrate (ISORDIL) tablet 10 mg  10 mg Oral TID Rashad Manjarrez MD   10  1397    traMADoL (ULTRAM) tablet 50 mg  50 mg Oral Q6H PRN Kirit Avendano MD        sodium chloride (NS) flush 5-40 mL  5-40 mL IntraVENous Q8H Maira Castaneda MD   10 mL at 04/01/21 0555    sodium chloride (NS) flush 5-40 mL  5-40 mL IntraVENous PRN Kirit Avendano MD        ondansetron [facility]) injection 4 mg  4 mg IntraVENous Q6H PRN Kirit Avendano MD        acetaminophen (TYLENOL) tablet 650 mg  650 mg Oral Q4H PRN Kirit Avendano MD        bisacodyL (DULCOLAX) tablet 5 mg  5 mg Oral DAILY PRN Kirit Avendano MD        enoxaparin (LOVENOX) injection 40 mg  40 mg SubCUTAneous Q24H Kirit Avendano MD   40 mg at 04/01/21 6620        Allergies   Allergen Reactions    Latex Other (comments)     \"it burns\"     No Known Drug Allergies Other (comments)    Other Plant, Animal, Environmental Other (comments)     DUST  POLLENS  MOLD SPORES      Pollen Extracts Other (comments)     DUST  POLLENS  MOLD SPORES       Review of Systems:  CONSTITUTIONAL: No weight loss, fever, chills, weakness or fatigue. HEENT: Eyes: No visual loss, blurred vision, double vision or yellow sclerae. Ears, Nose, Throat: No hearing loss, sneezing, congestion, runny nose or sore throat. SKIN: No rash or itching. CARDIOVASCULAR: No chest pain, chest pressure or chest discomfort. No palpitations or edema. RESPIRATORY: No shortness of breath, cough or sputum. GASTROINTESTINAL: No anorexia, nausea, vomiting or diarrhea. No abdominal pain or blood. GENITOURINARY: no burning with urination. NEUROLOGICAL: No headache, dizziness, syncope, paralysis, ataxia, numbness or tingling in the extremities. No change in bowel or bladder control. MUSCULOSKELETAL: No muscle, back pain, joint pain or stiffness. HEMATOLOGIC: No anemia, bleeding or bruising. LYMPHATICS: No enlarged nodes. No history of splenectomy. PSYCHIATRIC: No history of depression or anxiety.   ENDOCRINOLOGIC: No reports of sweating, cold or heat intolerance. No polyuria or polydipsia. ALLERGIES: No history of asthma, hives, eczema or rhinitis. Objective:     Vitals:    04/01/21 0000 04/01/21 0400 04/01/21 0800 04/01/21 1200   BP: 119/62 133/71 138/78 (!) 158/84   Pulse: (!) 50 (!) 47 (!) 51 (!) 55   Resp: 18 18 20 20   Temp: 97.9 °F (36.6 °C) 97.9 °F (36.6 °C) 97.7 °F (36.5 °C) 97.9 °F (36.6 °C)   SpO2: 91% 94% 95% 94%   Weight:       Height:            Physical Exam:  General - Well developed, well nourished, in no apparent distress. Pleasant and conversent. HEENT - Normocephalic, atraumatic. Conjunctiva, tympanic membranes, and oropharynx are clear. Neck - Supple without masses, no bruits   Cardiovascular - Regular rate and rhythm. Normal S1, S2 without murmurs, rubs, or gallops. Lungs - Clear to auscultation. Abdomen - Soft, nontender with normal bowel sounds. Extremities - Peripheral pulses intact. No edema and no rashes. Neurological examination - Comprehension, attention , memory and reasoning are intact. Language and speech are normal. On cranial nerve examination pupils are equal round and reactive to light. Fundoscopic examination is normal. Visual acuity is adequate. Visual fields are full to finger confrontation. Extraocular motility is normal. Face is symmetric and sensation is intact to light touch. Hearing is intact to finger rustle bilaterally. Motor examination - There is normal muscle tone and bulk. Power is full throughout. Muscle stretch reflexes are normoactive and there are no pathological reflexes present with exception to areflexia at the patella on the left. Sensation to light touch is decreased in the distal bilateral lower limbs which is chronic.   Cerebellar examination is normal. Gait and stance are normal.     NIHSS   NIHSS Score: 0  1a-Level of Consciousness 0  1b-What is Month/Age 0  1c-Open/Close Eyes&Hand 0  2 -Best Gaze 0  3 -Visual Fields 0  4 -Facial Palsy 0  5a-Motor-Left Arm mg at 10/22/22 0609   • metoPROLOL succinate (TOPROL-XL) ER tablet 12.5 mg  12.5 mg Oral BID Rashad Manjarrez MD   12.5 mg at 10/22/22 1112   • tamsulosin (FLOMAX) capsule 0.4 mg  0.4 mg Oral Q Evening Rashad Manjarrez MD   0.4 mg at 10/21/22 1913   • Potassium Standard Replacement Protocol (Levels 3.5 and lower)   Does not apply See Admin Instructions Rashad Manjarrez MD       • Potassium Replacement (Levels 3.6 - 4)   Does not apply See Admin Instructions Rashad Manjarrez MD       • Magnesium Standard Replacement Protocol   Does not apply See Admin Instructions Rashad Manjarrez MD       • sodium chloride (PF) 0.9 % injection 2 mL  2 mL Intracatheter 2 times per day Rashad Manjarrez MD   2 mL at 10/22/22 1118       Continuous Infusions:   Current Facility-Administered Medications   Medication Dose Route Frequency Provider Last Rate Last Admin       Physical Exam:  HEENT: atraumatic, normocephalic  C/L: coarse to auscultation anterior. No rales.   CVS: regular rate and rhythm, no rubs/gallop.   Abd: soft, nontender, rounded, not distended  Ext: trace edema LE. PVD changes legs.   Neuro:no focal changes, no motor sensory deficits. Generalized weakness.   Psych:alert/orientated, less blunted affect    Laboratory Results:    Lab Results   Component Value Date    FIO2 30 11/20/2019    APH 7.44 08/26/2022    APCO2 35 08/26/2022    APO2 187 (H) 08/26/2022    AHCO3 23 08/26/2022    ASAT 100 (H) 08/26/2022    URIC 8.7 (H) 08/23/2022     Lab Results   Component Value Date    INR 1.7 08/25/2022    PTT 60 (H) 08/25/2022    CRP 16.0 (H) 08/21/2022    PST 13 (L) 10/08/2022    FERR 315 08/29/2022    OSMO 293 10/06/2022       Urine Panel  Lab Results   Component Value Date    UOSM 324 10/18/2022    GERARDO 8 10/18/2022    UKET Negative 10/21/2022    UPROT Negative 10/21/2022    UWBC Negative 10/21/2022    URBC Negative 10/21/2022    UNITR Negative 10/21/2022    UBILI Negative 10/21/2022    UPH 5.0 10/21/2022    USPG 1.012  0  5b-Motor-Right Arm 0  6a-Motor-Left Leg 0  6b-Motor-Right Leg 0  7 -Limb Ataxia 0  8 -Sensory 0  9 -Best Language 0  10-Dysarthria 0  11-Extinction/Inattention 0    Lab Results   Component Value Date/Time    Cholesterol, total 133 04/01/2021 04:17 AM    HDL Cholesterol 52 04/01/2021 04:17 AM    LDL, calculated 64 04/01/2021 04:17 AM    VLDL, calculated 17 04/01/2021 04:17 AM    Triglyceride 85 04/01/2021 04:17 AM    CHOL/HDL Ratio 2.6 04/01/2021 04:17 AM        Lab Results   Component Value Date/Time    Hemoglobin A1c 6.1 04/01/2021 04:16 AM        CTA Results (most recent): Personally Reviewed   Results from Hospital Encounter encounter on 03/31/21   CTA CODE NEURO HEAD AND NECK W CONT    Narrative Title:  CT arteriogram of the neck and head. Indication: Transient ischemic attack (TIA). .    Technique: Axial images of the neck and head were obtained after the uneventful   administration of intravenous iodinated contrast media. Contrast was used to  best identify the arterial structures. Images were reviewed on a separate, free  standing, three-dimensional workstation as per the referring physicians request.       All stenosis percentages derived by comparing the narrowest segment with the  distal Internal Carotid Artery luminal diameter, as described in the [place]  American Symptomatic Carotid Endarterectomy Trial (NASCET) criteria. All CT scans at this facility are performed using dose reduction/dose modulation  techniques, as appropriate the performed exam, including the following:   Automated Exposure Control; Adjustment of the mA and/or kV according to patient  size (this includes techniques or standardized protocols for targeted exams  where dose is matched to indication/reason for exam); and Use of Iterative  Reconstruction Technique. The study was analyzed by the [address] ai algorithm. Comparison: None. Findings:     Lungs:  No focal consolidation or pleural effusions.  No suspicious 10/21/2022    UBACTR NONE SEEN 11/22/2019       Recent Labs     10/20/22  0428 10/21/22  0442 10/22/22  0452   SODIUM 136 136 134*   POTASSIUM 3.2* 3.3* 3.8   CHLORIDE 93* 95* 93*   CO2 34* 37* 32   ANIONGAP 12 7 13   * 87* 81*   CREATININE 4.24* 3.25* 2.64*   GLUCOSE 130* 148* 217*   MG 2.3 2.2 2.1   ALBUMIN 3.0* 3.0* 3.1*   ALKPT 143* 147* 157*   BILIRUBIN 2.3* 2.2* 2.3*   AST 50* 45* 41*   GPT 39 37 37   WBC 6.0 5.2 4.8   HGB 10.9* 10.3* 10.8*   HCT 33.2* 31.9* 32.9*    246 281           Diagnosis/Plan:    · Acute Renal Failure/CRF4: nonoliguric in patient admitted s/p falls at home. Underlying severe CM/pulmonary HTN.   · Prerenal azotemia/over diuresis. Levels improved on IVF, capped now. BNP noted.   · Will resume bumex 1 mg daily as renal fx continues to improve  · Cardiomyopathy: euvolemic. Holding diuretics as above. Cards follows. Resume PO diuretic dosing when indicated/stable.   · Debilitation/falls: supportive cares.              pulmonary  nodules. .    Bones:  No osseous destruction. Moderate multilevel degenerative changes. This  is most focal at the 4-5 and C5-6 where there is degenerative disc disease. Multilevel facet arthropathy is also present. Paranasal sinuses:  Opacification of the bilateral maxillary sinuses and ethmoid  air cells. .    Brain:  No midline shift. No enhancing mass lesion or hydrocephalus. .    Soft tissues:  Within normal limits. .      Dural venous sinuses:  Patent. Aortic arch:  Non-aneurysmal. Arch vessels are patent. Moderate tortuosity of  the arch vessels. Mild atherosclerotic changes. ANTERIOR CIRCULATION    Right common carotid artery:  No significant stenosis or occlusion. Right internal carotid artery:  No significant stenosis or occlusion. Atherosclerotic changes are present along the carotid siphon without significant  narrowing. Right middle cerebral artery:  No significant stenosis, occlusion, or aneurysm. Right anterior cerebral artery:  No significant stenosis, occlusion, or  aneurysm. Left common carotid artery: No significant stenosis or occlusion. Left internal carotid artery:  Mild, approximately 30%, stenosis in the proximal  left internal carotid artery secondary to atherosclerosis. Atherosclerotic  changes are present along the carotid siphon without significant narrowing. Left middle cerebral artery:  No significant stenosis, occlusion, or aneurysm. Left anterior cerebral artery:  No significant stenosis, occlusion, or aneurysm. Anterior communicating artery: No significant stenosis, occlusion, or aneurysm. POSTERIOR CIRCULATION    Right vertebral artery:  No significant stenosis or occlusion. Left vertebral artery:  No significant stenosis or occlusion. Basilar artery:  No significant stenosis, occlusion, or aneurysm. Right posterior communicating artery: No significant stenosis, occlusion, or  aneurysm.   Left posterior communicating artery:  No significant stenosis, occlusion, or  aneurysm. Right posterior cerebral artery: Moderate to high-grade stenosis in the proximal  right PCA. Fetal type right PCA. Left posterior cerebral artery: Moderate stenosis in the proximal left PCA. Impression 1. No acute arterial occlusion is identified. 2.  Bilateral posterior cerebral artery stenoses, right greater than left. 3.  Evidence of chronic sinusitis. Results for orders placed or performed during the hospital encounter of 03/31/21   EKG, 12 LEAD, INITIAL   Result Value Ref Range    Ventricular Rate 56 BPM    Atrial Rate 56 BPM    P-R Interval 168 ms    QRS Duration 88 ms    Q-T Interval 426 ms    QTC Calculation (Bezet) 411 ms    Calculated P Axis 70 degrees    Calculated R Axis 66 degrees    Calculated T Axis 60 degrees    Diagnosis       !! AGE AND GENDER SPECIFIC ECG ANALYSIS !! Sinus bradycardia  Otherwise normal ECG  No previous ECGs available  Confirmed by Jane Sheridan MD (), [place] ([id number]) on 4/1/2021 7:50:22 AM     Results for orders placed or performed in visit on 09/30/19   AMB POC EKG ROUTINE W/ 12 LEADS, INTER & REP    Impression    EKG reviewed  Normal Sr  Rate normal  QRS normal  No ST elevation            MRI Results (most recent): Personally Reviewed  Results from [place] encounter on 03/31/21   MRI BRAIN WO CONT    Narrative MRI brain without contrast    History: 65 y/o here for TIA. No hx of cancer. CT on PACS    Imaging sequences: Sagittal short TR/short TE, axial short TR/short TE, long  TR/long TE, FLAIR, gradient recall, diffusion weighted images and ADC mapping. Coronal FLAIR. Imaging was performed on a 1.5 Lou magnet. Comparison: None. Correlation was made to the CT scan of the brain and CT  perfusion study performed one day earlier. Findings: The ventricles are normal in size and configuration. There are no  extra-axial fluid collections. Normal flow voids are present within all of the  major intracranial vessels. No evidence of intraparenchymal hemorrhage or mass  effect is identified. There are no areas of restricted diffusion to suggest an  acute or subacute infarction. Patchy and discrete foci of T2 prolongation are present within the  supratentorial white matter. These are nonspecific findings but would be most  compatible with mild to moderate chronic small vessel ischemic changes. Patchy  T2 prolongation within the central freda is felt to represent part of the same  process. Innumerable prominent perivascular spaces are present within the  bilateral basal ganglia. There is a moderate left mastoid effusion. There is moderate opacification of  bilateral ethmoid air cells and maxillary sinuses. There is mild mucosal  thickening of the sphenoid sinuses. Impression 1. No evidence of acute infarction. 2. Findings most compatible with mild to moderate chronic small vessel ischemic  change. 3. Left mastoid effusion and paranasal sinus disease. Assessment:     64-year-old man with acute onset of bilateral lower limb weakness and dysarthria. It would be difficult to localize this to the brain due to having bilateral symptoms in the lower limbs. Transient hypoperfusion to the watershed area of the ZULEIKA/MCA could potentially cause the symptoms but one would expect evidence of ischemia on MRI given the duration of his symptoms. This makes a vascular event to the brain unlikely. Spinal cord pathology could account for bilateral lower limb weakness but would not cause dysarthria. Because the patient had dysarthria we will treat this as a TIA. No evidence of myelopathy on examination. Plan:     Dual antiplatelet therapy for 21 days with aspirin and Plavix. Addendum: The patient is on NSAIDs so we cannot do dual antiplatelet therapy. Consider stopping NSAIDs.   Otherwise, aspirin 81 mg daily    High intensity statin    Long-term blood pressure goal less than 140/90 or 130/80, depending on tolerability    Echocardiogram ordered. Cardiac telemetry.     Signed By: Diana Payne DO     April 1, 2021

## 2022-11-22 ENCOUNTER — TELEPHONE (OUTPATIENT)
Dept: INTERNAL MEDICINE CLINIC | Facility: CLINIC | Age: 77
End: 2022-11-22

## 2022-11-22 DIAGNOSIS — Z01.10 ENCOUNTER FOR HEARING EXAMINATION, UNSPECIFIED WHETHER ABNORMAL FINDINGS: Primary | ICD-10-CM

## 2022-11-22 NOTE — TELEPHONE ENCOUNTER
Patient came into the office wanting to see if he can get a referral to an audiologist due to his hearing aid going dead and not working properly .     Wanted to go to [facility] ENT, Allergy and Cosimo Milks Dr. Leonides Payne

## 2022-12-13 DIAGNOSIS — K29.01 OTHER ACUTE GASTRITIS WITH HEMORRHAGE: ICD-10-CM

## 2022-12-13 RX ORDER — PANTOPRAZOLE SODIUM 40 MG/1
40 TABLET, DELAYED RELEASE ORAL DAILY
Qty: 90 TABLET | Refills: 1 | Status: SHIPPED | OUTPATIENT
Start: 2022-12-13

## 2023-01-16 RX ORDER — HYDROCORTISONE 25 MG/G
CREAM TOPICAL
Qty: 28 G | Refills: 3 | Status: SHIPPED | OUTPATIENT
Start: 2023-01-16

## 2023-01-16 NOTE — TELEPHONE ENCOUNTER
Medication Refill Request      Name of Medication : Proctozone      Strength of Medication: 2.5%      Directions:        30 day or 90 day supply:        Preferred Pharmacy: [facility] in [address] [place] [address] Provider:  PT said he's recently started having issues with genital itching and this helps him. He said he's had it for a few years and not sure where it came from.   I asked him to spell it several times and it was hard to understand

## 2023-03-03 RX ORDER — OMEPRAZOLE 20 MG/1
CAPSULE, DELAYED RELEASE ORAL
Qty: 90 CAPSULE | Refills: 3 | Status: SHIPPED | OUTPATIENT
Start: 2023-03-03

## 2023-03-16 RX ORDER — IPRATROPIUM BROMIDE 42 UG/1
2 SPRAY, METERED NASAL 4 TIMES DAILY
Qty: 3 EACH | Refills: 3 | Status: SHIPPED | OUTPATIENT
Start: 2023-03-16

## 2023-03-16 NOTE — TELEPHONE ENCOUNTER
Medication Refill Request      Name of Medication : Itratropium  nasal spray      Strength of Medication: .06 %      Directions: 2 sprays each nostril 4 times daily      30 day or 90 day supply: 90      Preferred Pharmacy: [facility] in [employer]    Additional Information For Provider: Patient states Pamela prescribed this for him before and he is out, needing it for nasal congestion PHYSICAL THERAPY KNEE EVALUATION - INPATIENT       Room Number: 404/404-A  Evaluation Date: 8/13/2021  Type of Evaluation: Initial  Physician Order: PT Eval and Treat    Presenting Problem: s/p R TKA  Reason for Therapy: Mobility Dysfunction and Discharge Short Form. Research supports that patients with this level of impairment may benefit from home with Cj Pelaez. Anticipate pt will be able to d/c home safely with assist from family and continued acute PT. PT recommendation home with assist, HHPT at d/c.     Laurence Hearn Regularly Uses: Reading glasses    Prior Level of [place]: IND with ADLs, IADLs without assistive device. Pt is a retired . SUBJECTIVE  \"I had my hip done in February. \"     PHYSICAL THERAPY EXAMINATION     OBJECTIVE  Precautions (G-Code): CK    FUNCTIONAL ABILITY STATUS  Gait Assessment   Gait Assistance: Contact guard assist (to SBA)  Distance (ft): 200' x 1  Assistive Device: Rolling walker  Pattern: Shuffle;R Decreased stance time;R Flexed knee  Stoop/Curb Assistance: Not teste

## 2023-03-20 RX ORDER — TAMSULOSIN HYDROCHLORIDE 0.4 MG/1
CAPSULE ORAL
Qty: 90 CAPSULE | Refills: 3 | Status: SHIPPED | OUTPATIENT
Start: 2023-03-20

## 2023-03-20 RX ORDER — HYDROCORTISONE 25 MG/G
CREAM TOPICAL
Qty: 28 G | Refills: 5 | Status: SHIPPED | OUTPATIENT
Start: 2023-03-20

## 2023-03-29 ENCOUNTER — TELEPHONE (OUTPATIENT)
Dept: INTERNAL MEDICINE CLINIC | Facility: CLINIC | Age: 78
End: 2023-03-29

## 2023-03-29 DIAGNOSIS — J32.9 CHRONIC SINUSITIS, UNSPECIFIED LOCATION: Primary | ICD-10-CM

## 2023-03-29 NOTE — TELEPHONE ENCOUNTER
REFERRAL REQUEST      Reason for Referral: ENT      If its a hand, shoulder, foot, or leg- Is it Left or Right? Sinus      How long has the problem been going on: Awhile        Have you been seen for this problem within the last 2-3 weeks : n/a        Do you have a doctor preference: No  If yes who would you like to see No, PT currently sees someone but he can't see him until the fall so he wants a referral sent to a different office.   (I think, very hard to understand on the phone) is the doctor

## 2023-03-30 NOTE — TELEPHONE ENCOUNTER
Patient is calling stated that he is open to going anywhere he wants to stay close to home but Dr. Charles Berg can not see him until September and that is too far out    He has no preference stated he is open to going to whoever Dr. Best Hooper

## 2023-03-30 NOTE — TELEPHONE ENCOUNTER
Carlos Reid for referral to [facility] ENT for chronic sinusitis cms

## 2023-04-27 ENCOUNTER — TELEPHONE (OUTPATIENT)
Dept: INTERNAL MEDICINE CLINIC | Facility: CLINIC | Age: 78
End: 2023-04-27

## 2023-04-27 NOTE — TELEPHONE ENCOUNTER
Care Transitions Initial Follow Up Call    Outreach made within 2 business days of discharge: Yes    Patient: Kenna Lacy Patient : 1945   MRN: [id number]  Reason for Admission: No discharge information exists for this patient. Discharge Date: 2023        Spoke with: Wife    Discharge department/facility: [facility]     TCM Interactive Patient Contact:  Was patient able to fill all prescriptions: Yes  Was patient instructed to bring all medications to the follow-up visit: Yes  Is patient taking all medications as directed in the discharge summary?  Yes  Does patient understand their discharge instructions: Yes  Does patient have questions or concerns that need addressed prior to 7-14 day follow up office visit: NO    Scheduled appointment with PCP within 7-14 days: Scheduled 5/3/2023 at 1145am.    Via Tirso Hodges, MA

## 2023-04-27 NOTE — TELEPHONE ENCOUNTER
D/C DATE: 4/27/23    D/C FROM: [facility]     D/C TO: home    PHONE NUMBER TO REACH PATIENT: [phone]    F/U APPT DATE & TIME: pending MD approval      Dx: possible stroke due to the narrowing of a blood vessel in brain

## 2023-05-03 ENCOUNTER — OFFICE VISIT (OUTPATIENT)
Dept: INTERNAL MEDICINE CLINIC | Facility: CLINIC | Age: 78
End: 2023-05-03

## 2023-05-03 VITALS
BODY MASS INDEX: 36.77 KG/M2 | HEART RATE: 57 BPM | RESPIRATION RATE: 16 BRPM | TEMPERATURE: 97.5 F | OXYGEN SATURATION: 94 % | SYSTOLIC BLOOD PRESSURE: 137 MMHG | HEIGHT: 64 IN | DIASTOLIC BLOOD PRESSURE: 75 MMHG | WEIGHT: 215.4 LBS

## 2023-05-03 DIAGNOSIS — I65.23 BILATERAL CAROTID ARTERY STENOSIS: ICD-10-CM

## 2023-05-03 DIAGNOSIS — I67.9 CEREBROVASCULAR DISEASE: ICD-10-CM

## 2023-05-03 RX ORDER — CLOPIDOGREL BISULFATE 75 MG/1
75 TABLET ORAL DAILY
COMMUNITY
Start: 2023-04-27

## 2023-05-03 RX ORDER — ASPIRIN 325 MG
325 TABLET ORAL DAILY
COMMUNITY

## 2023-05-03 RX ORDER — GLUCOSAMINE/D3/BOSWELLIA SERRA 1500MG-400
TABLET ORAL DAILY
COMMUNITY

## 2023-05-03 RX ORDER — ATORVASTATIN CALCIUM 80 MG/1
80 TABLET, FILM COATED ORAL DAILY
COMMUNITY
Start: 2023-04-27

## 2023-05-03 RX ORDER — ACETAMINOPHEN 325 MG/1
650 TABLET ORAL EVERY 6 HOURS PRN
COMMUNITY
Start: 2023-04-27 | End: 2023-05-27

## 2023-05-03 SDOH — ECONOMIC STABILITY: HOUSING INSECURITY
IN THE LAST 12 MONTHS, WAS THERE A TIME WHEN YOU DID NOT HAVE A STEADY PLACE TO SLEEP OR SLEPT IN A SHELTER (INCLUDING NOW)?: NO

## 2023-05-03 SDOH — ECONOMIC STABILITY: FOOD INSECURITY: WITHIN THE PAST 12 MONTHS, THE FOOD YOU BOUGHT JUST DIDN'T LAST AND YOU DIDN'T HAVE MONEY TO GET MORE.: NEVER TRUE

## 2023-05-03 SDOH — ECONOMIC STABILITY: INCOME INSECURITY: HOW HARD IS IT FOR YOU TO PAY FOR THE VERY BASICS LIKE FOOD, HOUSING, MEDICAL CARE, AND HEATING?: NOT HARD AT ALL

## 2023-05-03 SDOH — ECONOMIC STABILITY: FOOD INSECURITY: WITHIN THE PAST 12 MONTHS, YOU WORRIED THAT YOUR FOOD WOULD RUN OUT BEFORE YOU GOT MONEY TO BUY MORE.: NEVER TRUE

## 2023-05-03 ASSESSMENT — PATIENT HEALTH QUESTIONNAIRE - PHQ9
2. FEELING DOWN, DEPRESSED OR HOPELESS: 0
SUM OF ALL RESPONSES TO PHQ9 QUESTIONS 1 & 2: 0
SUM OF ALL RESPONSES TO PHQ QUESTIONS 1-9: 0
1. LITTLE INTEREST OR PLEASURE IN DOING THINGS: 0
SUM OF ALL RESPONSES TO PHQ QUESTIONS 1-9: 0

## 2023-05-03 NOTE — PROGRESS NOTES
5/3/2023 1:43 PM  Location:[facility] [address] INTERNAL MEDICINE  [place]  Patient #:  [id number]  YOB: 1945          YOUR LAST HEMOGLOBIN A1CS:   No results found for: HBA1C, [id number]    YOUR LAST LIPID PROFILE:   Lab Results   Component Value Date/Time    CHOL 133 04/01/2021 04:17 AM    HDL 52 04/01/2021 04:17 AM         Lab Results   Component Value Date/Time    GFRAA >60 08/18/2022 02:56 PM    BUN 14 08/18/2022 02:56 PM     08/18/2022 02:56 PM    K 4.0 08/18/2022 02:56 PM     08/18/2022 02:56 PM    CO2 24 08/18/2022 02:56 PM           History of Present Illness     Chief Complaint   Patient presents with    Follow-up     TCM: [facility] Follow-up- Discharged 4/27/2023    Cerebrovascular Accident     Was having stroke like symptoms; since he was discharged on 4/27/2023 he had to go back to the hospital at [facility] on 5/2/2023 due to having the stroke like symptoms again. They kept him over night and he was discharged today, left hemiparesis noted initially, some symptoms noted yesterday, dealing with long covid, has appt with ENT    Foot Problem     Having burning pain in both feet but the left foot is worse. The foot is very sensitive to the touch    Neck Pain     Having some issues with his neck    Back Pain     Progressing, with neuropathy     This patient was discharged this morning after he was hospitalized yesterday for what he felt were TIA symptoms. On April 25 he was admitted after he developed hemiparesis on the left with the dysarthria. A full work-up was performed showing no evidence of an infarct. He was noted to have bilateral posterior cerebral artery stenoses, high-grade as well as nonoperative carotid artery disease less than 50% stenosis. Work-up also included a normal echocardiogram cardiac monitoring for atrial fibrillation. Yesterday thought he was having recurrent symptoms and was admitted overnight for observation.

## 2023-05-26 ENCOUNTER — TELEPHONE (OUTPATIENT)
Dept: INTERNAL MEDICINE CLINIC | Facility: CLINIC | Age: 78
End: 2023-05-26

## 2023-05-26 NOTE — TELEPHONE ENCOUNTER
Patient states he cut his leg 4 days ago. He states it bled a lot. He was able to stop bleeding with a compression bandage and it has not bled again. He is asking if you think he should get his INR checked while he is on Plavix.

## 2023-05-26 NOTE — TELEPHONE ENCOUNTER
Patient called about blood thinners , he had a cut and it oozes blood. He wanted to know if he needs to stop or how long should he take the blood thinners?

## 2023-06-08 RX ORDER — OLSALAZINE SODIUM 250 MG/1
CAPSULE, GELATIN COATED ORAL
Qty: 180 CAPSULE | Refills: 3 | Status: SHIPPED | OUTPATIENT
Start: 2023-06-08

## 2023-06-08 NOTE — TELEPHONE ENCOUNTER
Requested Prescriptions     Pending Prescriptions Disp Refills    olsalazine (DIPENTUM) 250 MG capsule 180 capsule 3

## 2023-08-03 ENCOUNTER — OFFICE VISIT (OUTPATIENT)
Dept: INTERNAL MEDICINE CLINIC | Facility: CLINIC | Age: 78
End: 2023-08-03
Payer: MEDICARE

## 2023-08-03 VITALS
HEART RATE: 60 BPM | DIASTOLIC BLOOD PRESSURE: 80 MMHG | HEIGHT: 64 IN | WEIGHT: 220 LBS | TEMPERATURE: 97.3 F | SYSTOLIC BLOOD PRESSURE: 153 MMHG | BODY MASS INDEX: 37.56 KG/M2 | OXYGEN SATURATION: 97 %

## 2023-08-03 DIAGNOSIS — L30.4 ECZEMA INTERTRIGO: ICD-10-CM

## 2023-08-03 DIAGNOSIS — I65.23 BILATERAL CAROTID ARTERY STENOSIS: ICD-10-CM

## 2023-08-03 DIAGNOSIS — J32.9 CHRONIC SINUSITIS, UNSPECIFIED LOCATION: ICD-10-CM

## 2023-08-03 DIAGNOSIS — M25.572 PAIN IN JOINTS OF BOTH FEET: ICD-10-CM

## 2023-08-03 DIAGNOSIS — Z12.5 ENCOUNTER FOR PROSTATE CANCER SCREENING: Primary | ICD-10-CM

## 2023-08-03 DIAGNOSIS — I67.9 CEREBROVASCULAR DISEASE: ICD-10-CM

## 2023-08-03 DIAGNOSIS — M25.571 PAIN IN JOINTS OF BOTH FEET: ICD-10-CM

## 2023-08-03 LAB
ALBUMIN SERPL-MCNC: 3.6 G/DL (ref 3.2–4.6)
ALBUMIN/GLOB SERPL: 1.2 (ref 0.4–1.6)
ALP SERPL-CCNC: 87 U/L (ref 50–136)
ALT SERPL-CCNC: 44 U/L (ref 12–65)
ANION GAP SERPL CALC-SCNC: 2 MMOL/L (ref 2–11)
AST SERPL-CCNC: 28 U/L (ref 15–37)
BASOPHILS # BLD: 0.1 K/UL (ref 0–0.2)
BASOPHILS NFR BLD: 1 % (ref 0–2)
BILIRUB SERPL-MCNC: 0.5 MG/DL (ref 0.2–1.1)
BUN SERPL-MCNC: 20 MG/DL (ref 8–23)
CALCIUM SERPL-MCNC: 8.8 MG/DL (ref 8.3–10.4)
CHLORIDE SERPL-SCNC: 111 MMOL/L (ref 101–110)
CO2 SERPL-SCNC: 30 MMOL/L (ref 21–32)
CREAT SERPL-MCNC: 1.2 MG/DL (ref 0.8–1.5)
CRP SERPL-MCNC: 0.4 MG/DL (ref 0–0.9)
DIFFERENTIAL METHOD BLD: ABNORMAL
EOSINOPHIL # BLD: 0.6 K/UL (ref 0–0.8)
EOSINOPHIL NFR BLD: 9 % (ref 0.5–7.8)
ERYTHROCYTE [DISTWIDTH] IN BLOOD BY AUTOMATED COUNT: 12.7 % (ref 11.9–14.6)
ERYTHROCYTE [SEDIMENTATION RATE] IN BLOOD: 11 MM/HR
GLOBULIN SER CALC-MCNC: 3.1 G/DL (ref 2.8–4.5)
GLUCOSE SERPL-MCNC: 120 MG/DL (ref 65–100)
HCT VFR BLD AUTO: 43.2 % (ref 41.1–50.3)
HGB BLD-MCNC: 15.1 G/DL (ref 13.6–17.2)
IMM GRANULOCYTES # BLD AUTO: 0 K/UL (ref 0–0.5)
IMM GRANULOCYTES NFR BLD AUTO: 0 % (ref 0–5)
LYMPHOCYTES # BLD: 1.7 K/UL (ref 0.5–4.6)
LYMPHOCYTES NFR BLD: 28 % (ref 13–44)
MCH RBC QN AUTO: 36 PG (ref 26.1–32.9)
MCHC RBC AUTO-ENTMCNC: 35 G/DL (ref 31.4–35)
MCV RBC AUTO: 103.1 FL (ref 82–102)
MONOCYTES # BLD: 0.6 K/UL (ref 0.1–1.3)
MONOCYTES NFR BLD: 10 % (ref 4–12)
NEUTS SEG # BLD: 3.1 K/UL (ref 1.7–8.2)
NEUTS SEG NFR BLD: 52 % (ref 43–78)
NRBC # BLD: 0 K/UL (ref 0–0.2)
PLATELET # BLD AUTO: 202 K/UL (ref 150–450)
PMV BLD AUTO: 10.8 FL (ref 9.4–12.3)
POTASSIUM SERPL-SCNC: 3.9 MMOL/L (ref 3.5–5.1)
PROT SERPL-MCNC: 6.7 G/DL (ref 6.3–8.2)
PSA SERPL-MCNC: 0.7 NG/ML
RBC # BLD AUTO: 4.19 M/UL (ref 4.23–5.6)
SODIUM SERPL-SCNC: 143 MMOL/L (ref 133–143)
URATE SERPL-MCNC: 7 MG/DL (ref 2.6–6)
WBC # BLD AUTO: 6.1 K/UL (ref 4.3–11.1)

## 2023-08-03 PROCEDURE — 1123F ACP DISCUSS/DSCN MKR DOCD: CPT | Performed by: NURSE PRACTITIONER

## 2023-08-03 PROCEDURE — 1036F TOBACCO NON-USER: CPT | Performed by: NURSE PRACTITIONER

## 2023-08-03 PROCEDURE — G8427 DOCREV CUR MEDS BY ELIG CLIN: HCPCS | Performed by: NURSE PRACTITIONER

## 2023-08-03 PROCEDURE — G8417 CALC BMI ABV UP PARAM F/U: HCPCS | Performed by: NURSE PRACTITIONER

## 2023-08-03 PROCEDURE — 99214 OFFICE O/P EST MOD 30 MIN: CPT | Performed by: NURSE PRACTITIONER

## 2023-08-03 RX ORDER — NYSTATIN 100000 [USP'U]/G
POWDER TOPICAL
Qty: 30 G | Refills: 2 | Status: SHIPPED | OUTPATIENT
Start: 2023-08-03

## 2023-08-03 RX ORDER — OXYBUTYNIN CHLORIDE 5 MG/1
1 TABLET, EXTENDED RELEASE ORAL DAILY
COMMUNITY
Start: 2023-04-27

## 2023-08-03 RX ORDER — TRAMADOL HYDROCHLORIDE 50 MG/1
50 TABLET ORAL EVERY 6 HOURS PRN
COMMUNITY

## 2023-08-03 ASSESSMENT — ENCOUNTER SYMPTOMS
SHORTNESS OF BREATH: 0
SINUS PRESSURE: 1
TROUBLE SWALLOWING: 1

## 2023-08-03 NOTE — PATIENT INSTRUCTIONS
Please call the neurologist for your medications to determine whether you need too stay on them.      Anastacio Lagos, SHAMA    [address] [place]    [address] [place] [address]    Phone: [phone]

## 2023-08-03 NOTE — PROGRESS NOTES
8/3/2023 8:18 AM  Location:[address] [place] MEDICINE  [place]  Patient #:  [id number]  YOB: 1945          YOUR LAST HEMOGLOBIN A1CS:   No results found for: HBA1C, [id number]    YOUR LAST LIPID PROFILE:   Lab Results   Component Value Date/Time    CHOL 133 04/01/2021 04:17 AM    HDL 52 04/01/2021 04:17 AM         Lab Results   Component Value Date/Time    GFRAA >60 08/18/2022 02:56 PM    BUN 14 08/18/2022 02:56 PM     08/18/2022 02:56 PM    K 4.0 08/18/2022 02:56 PM     08/18/2022 02:56 PM    CO2 24 08/18/2022 02:56 PM           History of Present Illness     Chief Complaint   Patient presents with    Skin Problem     Itching in groin area starting one year ago. Pt states the itching is now in his hips. Pt does mention he has a bump on his left flank area. Mr. Swathi Lui is a 76 y.o. male  who presents for the above mentioned complaints. Mr. Swathi Lui presents for several concerns. He reports that he would like a prostate test this morning as he has a history of a UroLift and BPH in the past.  He reports chronic itching, was first in his groin but now has spread to his hips and back. He denies new detergents or dietary changes. He reports chronic sinus issues and what he describes as long-haul COVID symptoms with tender feet and achy joints. He had a TIA in May and still has trouble with his speech and with swallowing. He reports that he stopped all the medications several weeks ago including Lipitor, aspirin and Plavix as he is waiting for sinuplasty. He feels upset with the medical community because he feels that the specialists are not communicating with him. He denies any new stroke symptoms, chest pain, shortness of breath. He denies fevers or chills, nausea or vomiting.   Thyroid studies in May were normal.         Allergies   Allergen Reactions    Latex Other (See Comments)     \"it burns\"      Past Medical History:   Diagnosis Date    Acute

## 2023-08-04 ENCOUNTER — TELEPHONE (OUTPATIENT)
Dept: INTERNAL MEDICINE CLINIC | Facility: CLINIC | Age: 78
End: 2023-08-04

## 2023-08-04 DIAGNOSIS — D75.89 MACROCYTOSIS WITHOUT ANEMIA: ICD-10-CM

## 2023-08-04 DIAGNOSIS — E53.8 VITAMIN B 12 DEFICIENCY: Primary | ICD-10-CM

## 2023-08-04 DIAGNOSIS — M10.9 ACUTE GOUT INVOLVING TOE, UNSPECIFIED CAUSE, UNSPECIFIED LATERALITY: Primary | ICD-10-CM

## 2023-08-04 LAB
Lab: NORMAL
REFERENCE LAB: NORMAL
VIT B12 SERPL-MCNC: 1162 PG/ML (ref 193–986)

## 2023-08-04 RX ORDER — COLCHICINE 0.6 MG/1
0.6 TABLET ORAL SEE ADMIN INSTRUCTIONS
Qty: 30 TABLET | Refills: 0 | Status: SHIPPED | OUTPATIENT
Start: 2023-08-04

## 2023-08-04 NOTE — TELEPHONE ENCOUNTER
Misha Ellison from lab services called about Dx code stated that for the b12 they need a new code that will cover lab

## 2023-08-04 NOTE — TELEPHONE ENCOUNTER
Mr. Echeverria Majestic-  The labs show normal prostate. The blood cells are a little big, so I am going to check for a b12 deficiency(can we add on a b12 and folate to labs?)  The uric acid level is a little elevated. This may indicate that the pain in the toes that you are having is gout. I have called in a  medicine to take when you are having the toe pain to try called colchicine. All other labs are stable. Please let me know if you are having any new or concerning symptoms.    Pamela

## 2023-08-04 NOTE — TELEPHONE ENCOUNTER
Tunde Channel 47 minutes ago (1:17 PM)       Hiram Escudero from lab services called about Dx code stated that for the b12 they need a new code that will cover lab

## 2023-08-04 NOTE — TELEPHONE ENCOUNTER
Dolly Patterson is going to add on B12 but folate has a 16hr add on time so it might not be able to be added. No answer no voicemail.

## 2023-08-07 ENCOUNTER — TELEPHONE (OUTPATIENT)
Dept: INTERNAL MEDICINE CLINIC | Facility: CLINIC | Age: 78
End: 2023-08-07

## 2023-08-07 LAB
Lab: NORMAL
Lab: NORMAL
REFERENCE LAB: NORMAL

## 2023-08-07 NOTE — TELEPHONE ENCOUNTER
----- Message from Jennifer Garcia sent at 8/7/2023  1:24 PM EDT -----  Subject: Message to Provider    QUESTIONS  Information for Provider? patient is returning a call made 8/4/2023 and   request a call back   ---------------------------------------------------------------------------  --------------  [address]  [phone]; OK to leave message on voicemail  ---------------------------------------------------------------------------  --------------  SCRIPT ANSWERS  undefined

## 2023-08-09 ENCOUNTER — OFFICE VISIT (OUTPATIENT)
Dept: INTERNAL MEDICINE CLINIC | Facility: CLINIC | Age: 78
End: 2023-08-09
Payer: MEDICARE

## 2023-08-09 VITALS
OXYGEN SATURATION: 95 % | HEART RATE: 65 BPM | HEIGHT: 67 IN | BODY MASS INDEX: 34.12 KG/M2 | WEIGHT: 217.4 LBS | RESPIRATION RATE: 16 BRPM | TEMPERATURE: 97.2 F | SYSTOLIC BLOOD PRESSURE: 147 MMHG | DIASTOLIC BLOOD PRESSURE: 79 MMHG

## 2023-08-09 DIAGNOSIS — M10.9 ACUTE GOUT INVOLVING TOE, UNSPECIFIED CAUSE, UNSPECIFIED LATERALITY: ICD-10-CM

## 2023-08-09 DIAGNOSIS — L29.3 GENITAL PRURITUS: ICD-10-CM

## 2023-08-09 DIAGNOSIS — K51.90 ULCERATIVE COLITIS WITHOUT COMPLICATIONS, UNSPECIFIED LOCATION (HCC): ICD-10-CM

## 2023-08-09 DIAGNOSIS — I67.9 CEREBROVASCULAR DISEASE: ICD-10-CM

## 2023-08-09 DIAGNOSIS — N40.1 BENIGN PROSTATIC HYPERPLASIA WITH LOWER URINARY TRACT SYMPTOMS, SYMPTOM DETAILS UNSPECIFIED: ICD-10-CM

## 2023-08-09 DIAGNOSIS — J32.9 CHRONIC SINUSITIS, UNSPECIFIED LOCATION: ICD-10-CM

## 2023-08-09 DIAGNOSIS — Z00.00 MEDICARE ANNUAL WELLNESS VISIT, SUBSEQUENT: Primary | ICD-10-CM

## 2023-08-09 PROCEDURE — G0439 PPPS, SUBSEQ VISIT: HCPCS | Performed by: INTERNAL MEDICINE

## 2023-08-09 PROCEDURE — G8427 DOCREV CUR MEDS BY ELIG CLIN: HCPCS | Performed by: INTERNAL MEDICINE

## 2023-08-09 PROCEDURE — 1123F ACP DISCUSS/DSCN MKR DOCD: CPT | Performed by: INTERNAL MEDICINE

## 2023-08-09 PROCEDURE — 99213 OFFICE O/P EST LOW 20 MIN: CPT | Performed by: INTERNAL MEDICINE

## 2023-08-09 PROCEDURE — 1036F TOBACCO NON-USER: CPT | Performed by: INTERNAL MEDICINE

## 2023-08-09 PROCEDURE — G8417 CALC BMI ABV UP PARAM F/U: HCPCS | Performed by: INTERNAL MEDICINE

## 2023-08-09 RX ORDER — COLCHICINE 0.6 MG/1
0.6 TABLET ORAL SEE ADMIN INSTRUCTIONS
Qty: 30 TABLET | Refills: 5 | Status: SHIPPED | OUTPATIENT
Start: 2023-08-09

## 2023-08-09 RX ORDER — NYSTATIN 100000 U/G
OINTMENT TOPICAL 2 TIMES DAILY
Qty: 30 G | Refills: 3 | Status: SHIPPED | OUTPATIENT
Start: 2023-08-09

## 2023-08-09 ASSESSMENT — LIFESTYLE VARIABLES
HOW OFTEN DO YOU HAVE A DRINK CONTAINING ALCOHOL: 2-3 TIMES A WEEK
HOW MANY STANDARD DRINKS CONTAINING ALCOHOL DO YOU HAVE ON A TYPICAL DAY: 1 OR 2

## 2023-08-09 ASSESSMENT — PATIENT HEALTH QUESTIONNAIRE - PHQ9
1. LITTLE INTEREST OR PLEASURE IN DOING THINGS: 0
SUM OF ALL RESPONSES TO PHQ QUESTIONS 1-9: 0
SUM OF ALL RESPONSES TO PHQ9 QUESTIONS 1 & 2: 0
SUM OF ALL RESPONSES TO PHQ QUESTIONS 1-9: 0
2. FEELING DOWN, DEPRESSED OR HOPELESS: 0

## 2023-08-15 DIAGNOSIS — K29.01 OTHER ACUTE GASTRITIS WITH HEMORRHAGE: ICD-10-CM

## 2023-08-15 RX ORDER — PANTOPRAZOLE SODIUM 40 MG/1
40 TABLET, DELAYED RELEASE ORAL DAILY
Qty: 90 TABLET | Refills: 3 | Status: SHIPPED | OUTPATIENT
Start: 2023-08-15

## 2023-10-20 ENCOUNTER — TELEPHONE (OUTPATIENT)
Dept: INTERNAL MEDICINE CLINIC | Facility: CLINIC | Age: 78
End: 2023-10-20

## 2023-10-20 NOTE — TELEPHONE ENCOUNTER
Pt called in to report potential TIA event yesterday, light headed, dilation and vision shift in left eye. Pt advised to reach out to neurologist or ER.  Pt is going to reach out to his neurologist first.

## 2023-11-20 ENCOUNTER — TELEPHONE (OUTPATIENT)
Dept: INTERNAL MEDICINE CLINIC | Facility: CLINIC | Age: 78
End: 2023-11-20

## 2023-11-20 DIAGNOSIS — M19.90 ARTHRITIS: Primary | ICD-10-CM

## 2023-11-20 RX ORDER — CELECOXIB 200 MG/1
200 CAPSULE ORAL DAILY
Qty: 90 CAPSULE | Refills: 3 | Status: SHIPPED | OUTPATIENT
Start: 2023-11-20

## 2023-11-20 RX ORDER — OMEPRAZOLE 20 MG/1
20 CAPSULE, DELAYED RELEASE ORAL DAILY
COMMUNITY

## 2023-11-20 NOTE — TELEPHONE ENCOUNTER
Called pt he is having a lot of arthritis pain and he isn't taking anything for it. He is wanting to see if he can go back to taking Celebrex. He no longer takes pantoprazole he is taking omeprazole 20 mg daily.

## 2023-11-20 NOTE — TELEPHONE ENCOUNTER
Pt reports pantoprazole is not helping effectively for acid reflex. Pt would also like to resume taking celebrex.

## 2024-01-05 ENCOUNTER — OFFICE VISIT (OUTPATIENT)
Dept: INTERNAL MEDICINE CLINIC | Facility: CLINIC | Age: 79
End: 2024-01-05
Payer: MEDICARE

## 2024-01-05 VITALS
TEMPERATURE: 97.2 F | HEIGHT: 67 IN | SYSTOLIC BLOOD PRESSURE: 140 MMHG | HEART RATE: 67 BPM | OXYGEN SATURATION: 92 % | BODY MASS INDEX: 35.41 KG/M2 | DIASTOLIC BLOOD PRESSURE: 80 MMHG | RESPIRATION RATE: 16 BRPM | WEIGHT: 225.6 LBS

## 2024-01-05 DIAGNOSIS — G45.9 TIA (TRANSIENT ISCHEMIC ATTACK): Primary | ICD-10-CM

## 2024-01-05 DIAGNOSIS — R03.0 ELEVATED BLOOD PRESSURE READING: ICD-10-CM

## 2024-01-05 PROCEDURE — G8484 FLU IMMUNIZE NO ADMIN: HCPCS | Performed by: INTERNAL MEDICINE

## 2024-01-05 PROCEDURE — G8417 CALC BMI ABV UP PARAM F/U: HCPCS | Performed by: INTERNAL MEDICINE

## 2024-01-05 PROCEDURE — G8427 DOCREV CUR MEDS BY ELIG CLIN: HCPCS | Performed by: INTERNAL MEDICINE

## 2024-01-05 PROCEDURE — 1123F ACP DISCUSS/DSCN MKR DOCD: CPT | Performed by: INTERNAL MEDICINE

## 2024-01-05 PROCEDURE — 99214 OFFICE O/P EST MOD 30 MIN: CPT | Performed by: INTERNAL MEDICINE

## 2024-01-05 PROCEDURE — 1036F TOBACCO NON-USER: CPT | Performed by: INTERNAL MEDICINE

## 2024-01-05 RX ORDER — CLOPIDOGREL BISULFATE 75 MG/1
75 TABLET ORAL DAILY
COMMUNITY
Start: 2023-12-18

## 2024-01-05 RX ORDER — ASPIRIN 81 MG/1
81 TABLET ORAL DAILY
COMMUNITY
Start: 2023-12-19 | End: 2024-12-18

## 2024-01-08 ENCOUNTER — TELEPHONE (OUTPATIENT)
Dept: INTERNAL MEDICINE CLINIC | Facility: CLINIC | Age: 79
End: 2024-01-08

## 2024-01-08 RX ORDER — PANTOPRAZOLE SODIUM 40 MG/1
40 TABLET, DELAYED RELEASE ORAL
Qty: 90 TABLET | Refills: 3 | Status: SHIPPED | OUTPATIENT
Start: 2024-01-08

## 2024-01-08 NOTE — PROGRESS NOTES
follow-up is recommended.      He did not have an operable lesion ie Carotid . Since discharge he has had no other symptoms. Denies palpitiations, Monitor showed no arrhythmia  Mr. Rainey is a 78 y.o. male  who presents for hospital follow up      Allergies   Allergen Reactions    Latex Other (See Comments)     \"it burns\"     Cat Hair Extract Itching    Pollen Extract Other (See Comments)     DUST  POLLENS  MOLD SPORES  Rhinitis      Molds & Smuts Itching and Other (See Comments)     Rhinitis       Past Medical History:   Diagnosis Date    Acute pancreatitis 8/17/2015    Allergic rhinitis 12/6/2015    Arthritis     OA    Backache, unspecified 8/17/2015    Benign neoplasm of colon 08/17/2015    hx    Benign neoplasm of other specified sites of skin 8/17/2015    Benign non-nodular prostatic hyperplasia with lower urinary tract symptoms 8/17/2015    Cervical arthritis 5/30/2017    Chronic sinusitis 8/17/2015    Depressive disorder, not elsewhere classified 8/17/2015    Deviated nasal septum 9/20/2016    Esophageal reflux 8/17/2015    Family history of sudden cardiac death     GERD (gastroesophageal reflux disease)     controlled w/med    History of COVID-19 03/2020    \"flu like symptoms\", HA    Hypertrophy of prostate with urinary obstruction and other lower urinary tract symptoms (LUTS) 08/17/2015    managed with medication, followed by PCP Dr. 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

## 2024-01-08 NOTE — TELEPHONE ENCOUNTER
Noted on chart review he is plavix and omeprazole, there is an interaction, he needs to d/c omeprazole and substitute Protonix 40 mg #90 1 day rfx3 cms

## 2024-01-08 NOTE — TELEPHONE ENCOUNTER
This has been fully explained to the patient's wife, who indicates understanding. Med List updated    Rx pending below to be sent to pt's pharmacy.

## 2024-01-30 ENCOUNTER — TELEPHONE (OUTPATIENT)
Dept: INTERNAL MEDICINE CLINIC | Facility: CLINIC | Age: 79
End: 2024-01-30

## 2024-01-30 NOTE — TELEPHONE ENCOUNTER
----- Message from Graham Hartmann MD sent at 1/30/2024  1:51 PM EST -----   Notify the patient that his cardiac echo was normal cms

## 2024-02-02 RX ORDER — CYCLOBENZAPRINE HCL 5 MG
5 TABLET ORAL 3 TIMES DAILY PRN
Qty: 30 TABLET | Refills: 3 | Status: SHIPPED | OUTPATIENT
Start: 2024-02-02

## 2024-02-13 ENCOUNTER — OFFICE VISIT (OUTPATIENT)
Dept: INTERNAL MEDICINE CLINIC | Facility: CLINIC | Age: 79
End: 2024-02-13
Payer: MEDICARE

## 2024-02-13 VITALS
WEIGHT: 223 LBS | SYSTOLIC BLOOD PRESSURE: 163 MMHG | TEMPERATURE: 97 F | BODY MASS INDEX: 35 KG/M2 | OXYGEN SATURATION: 93 % | RESPIRATION RATE: 16 BRPM | HEART RATE: 60 BPM | HEIGHT: 67 IN | DIASTOLIC BLOOD PRESSURE: 72 MMHG

## 2024-02-13 DIAGNOSIS — H90.71 MIXED CONDUCTIVE AND SENSORINEURAL HEARING LOSS OF RIGHT EAR, UNSPECIFIED HEARING STATUS ON CONTRALATERAL SIDE: ICD-10-CM

## 2024-02-13 DIAGNOSIS — K51.90 ULCERATIVE COLITIS WITHOUT COMPLICATIONS, UNSPECIFIED LOCATION (HCC): ICD-10-CM

## 2024-02-13 DIAGNOSIS — N40.1 BENIGN PROSTATIC HYPERPLASIA WITH LOWER URINARY TRACT SYMPTOMS, SYMPTOM DETAILS UNSPECIFIED: ICD-10-CM

## 2024-02-13 DIAGNOSIS — J30.1 ALLERGIC RHINITIS DUE TO POLLEN, UNSPECIFIED SEASONALITY: ICD-10-CM

## 2024-02-13 DIAGNOSIS — M54.2 NECK PAIN: ICD-10-CM

## 2024-02-13 DIAGNOSIS — I65.23 BILATERAL CAROTID ARTERY STENOSIS: ICD-10-CM

## 2024-02-13 DIAGNOSIS — I67.9 CEREBROVASCULAR DISEASE: Primary | ICD-10-CM

## 2024-02-13 PROCEDURE — G8484 FLU IMMUNIZE NO ADMIN: HCPCS | Performed by: INTERNAL MEDICINE

## 2024-02-13 PROCEDURE — G8428 CUR MEDS NOT DOCUMENT: HCPCS | Performed by: INTERNAL MEDICINE

## 2024-02-13 PROCEDURE — 1123F ACP DISCUSS/DSCN MKR DOCD: CPT | Performed by: INTERNAL MEDICINE

## 2024-02-13 PROCEDURE — G8417 CALC BMI ABV UP PARAM F/U: HCPCS | Performed by: INTERNAL MEDICINE

## 2024-02-13 PROCEDURE — 99214 OFFICE O/P EST MOD 30 MIN: CPT | Performed by: INTERNAL MEDICINE

## 2024-02-13 PROCEDURE — 1036F TOBACCO NON-USER: CPT | Performed by: INTERNAL MEDICINE

## 2024-02-13 NOTE — PROGRESS NOTES
2/13/2024 8:58 AM  Location:[facility] PHYSICIAN SERVICES  [facility] [place]  [place]  Patient #:  [id number]  YOB: 1945          YOUR LAST HEMOGLOBIN A1CS:   No results found for: \"HBA1C\", \"[id number]\"    YOUR LAST LIPID PROFILE:   Lab Results   Component Value Date/Time    CHOL 133 04/01/2021 04:17 AM    HDL 52 04/01/2021 04:17 AM         Lab Results   Component Value Date/Time    GFRAA >60 08/18/2022 02:56 PM    BUN 20 08/03/2023 08:53 AM     08/03/2023 08:53 AM    K 3.9 08/03/2023 08:53 AM     08/03/2023 08:53 AM    CO2 30 08/03/2023 08:53 AM           History of Present Illness     Chief Complaint   Patient presents with   • 6 Month Follow-Up     Pt presents to the office today for a 6 month follow-up  Ate pretzels last pm, does not check bp at home     • Neck Pain     The left side of the neck is sore and feel like pins and needles sticking him.   • Peripheral Neuropathy     Goes to chiropractor for treatment, bid treatments helped  Some improvement in left , Dr Caballero   The patient is had no recurrence of his TIAs.  Since his last visit cardiac echo showed no significant abnormalities.  He is complaining of a numbing sensation in the left neck area with decreased range of motion.  There is no radicular component to this.  He had a CT of the neck in 2022 which showed diffuse spondylosis.  All he is continuing to be under some stress caring for his wife who has Parkinson's disease.  He does not check blood pressures on a regular basis.    Mr. Rainey is a 78 y.o. male  who presents for office visit      Allergies   Allergen Reactions   • Latex Other (See Comments)     \"it burns\"    • Cat Hair Extract Itching   • Pollen Extract Other (See Comments)     DUST  POLLENS  MOLD SPORES  Rhinitis     • Molds & Smuts Itching and Other (See Comments)     Rhinitis       Past Medical History:   Diagnosis Date   • Acute pancreatitis 8/17/2015   • Allergic rhinitis 12/6/2015   • Arthritis

## 2024-04-12 RX ORDER — OLSALAZINE SODIUM 250 MG/1
CAPSULE, GELATIN COATED ORAL
Qty: 180 CAPSULE | Refills: 3 | Status: SHIPPED | OUTPATIENT
Start: 2024-04-12

## 2024-05-07 DIAGNOSIS — L29.3 GENITAL PRURITUS: ICD-10-CM

## 2024-05-07 RX ORDER — NYSTATIN 100000 U/G
OINTMENT TOPICAL
Qty: 180 G | Refills: 5 | Status: SHIPPED | OUTPATIENT
Start: 2024-05-07

## 2024-07-08 NOTE — TELEPHONE ENCOUNTER
Goal Outcome Evaluation:  Plan of Care Reviewed With: patient        Progress: improving  Outcome Evaluation: Pt A/O x4, VSS, on RA, NSR on the monitor. Atarax given for anxiety. Pt is NPO since midnight for her heart cath. Pt is resting with no complaints.                                Pharmacist received new AF

## 2024-08-14 ENCOUNTER — OFFICE VISIT (OUTPATIENT)
Dept: INTERNAL MEDICINE CLINIC | Facility: CLINIC | Age: 79
End: 2024-08-14
Payer: MEDICARE

## 2024-08-14 VITALS
HEART RATE: 56 BPM | WEIGHT: 217.4 LBS | OXYGEN SATURATION: 99 % | SYSTOLIC BLOOD PRESSURE: 169 MMHG | TEMPERATURE: 97.4 F | RESPIRATION RATE: 16 BRPM | DIASTOLIC BLOOD PRESSURE: 85 MMHG | HEIGHT: 67 IN | BODY MASS INDEX: 34.12 KG/M2

## 2024-08-14 DIAGNOSIS — R53.83 OTHER FATIGUE: ICD-10-CM

## 2024-08-14 DIAGNOSIS — R03.0 ELEVATED BLOOD PRESSURE READING IN OFFICE WITH WHITE COAT SYNDROME, WITHOUT DIAGNOSIS OF HYPERTENSION: ICD-10-CM

## 2024-08-14 DIAGNOSIS — Z00.00 ROUTINE GENERAL MEDICAL EXAMINATION AT A HEALTH CARE FACILITY: Primary | ICD-10-CM

## 2024-08-14 DIAGNOSIS — G45.9 TIA (TRANSIENT ISCHEMIC ATTACK): ICD-10-CM

## 2024-08-14 DIAGNOSIS — N40.1 BENIGN PROSTATIC HYPERPLASIA WITH LOWER URINARY TRACT SYMPTOMS, SYMPTOM DETAILS UNSPECIFIED: ICD-10-CM

## 2024-08-14 DIAGNOSIS — I67.9 CEREBROVASCULAR DISEASE: ICD-10-CM

## 2024-08-14 DIAGNOSIS — I65.23 BILATERAL CAROTID ARTERY STENOSIS: ICD-10-CM

## 2024-08-14 DIAGNOSIS — K51.90 ULCERATIVE COLITIS WITHOUT COMPLICATIONS, UNSPECIFIED LOCATION (HCC): ICD-10-CM

## 2024-08-14 DIAGNOSIS — H90.71 MIXED CONDUCTIVE AND SENSORINEURAL HEARING LOSS OF RIGHT EAR, UNSPECIFIED HEARING STATUS ON CONTRALATERAL SIDE: ICD-10-CM

## 2024-08-14 DIAGNOSIS — Z12.5 ENCOUNTER FOR SCREENING FOR MALIGNANT NEOPLASM OF PROSTATE: ICD-10-CM

## 2024-08-14 PROCEDURE — G2211 COMPLEX E/M VISIT ADD ON: HCPCS | Performed by: NURSE PRACTITIONER

## 2024-08-14 PROCEDURE — 1123F ACP DISCUSS/DSCN MKR DOCD: CPT | Performed by: NURSE PRACTITIONER

## 2024-08-14 PROCEDURE — G8417 CALC BMI ABV UP PARAM F/U: HCPCS | Performed by: NURSE PRACTITIONER

## 2024-08-14 PROCEDURE — 1036F TOBACCO NON-USER: CPT | Performed by: NURSE PRACTITIONER

## 2024-08-14 PROCEDURE — G8427 DOCREV CUR MEDS BY ELIG CLIN: HCPCS | Performed by: NURSE PRACTITIONER

## 2024-08-14 PROCEDURE — 99214 OFFICE O/P EST MOD 30 MIN: CPT | Performed by: NURSE PRACTITIONER

## 2024-08-14 RX ORDER — DUPILUMAB 300 MG/2ML
INJECTION, SOLUTION SUBCUTANEOUS
COMMUNITY
Start: 2024-08-04

## 2024-08-14 RX ORDER — ALBUTEROL SULFATE 90 UG/1
AEROSOL, METERED RESPIRATORY (INHALATION)
COMMUNITY

## 2024-08-14 SDOH — ECONOMIC STABILITY: FOOD INSECURITY: WITHIN THE PAST 12 MONTHS, THE FOOD YOU BOUGHT JUST DIDN'T LAST AND YOU DIDN'T HAVE MONEY TO GET MORE.: NEVER TRUE

## 2024-08-14 SDOH — ECONOMIC STABILITY: FOOD INSECURITY: WITHIN THE PAST 12 MONTHS, YOU WORRIED THAT YOUR FOOD WOULD RUN OUT BEFORE YOU GOT MONEY TO BUY MORE.: NEVER TRUE

## 2024-08-14 SDOH — ECONOMIC STABILITY: INCOME INSECURITY: HOW HARD IS IT FOR YOU TO PAY FOR THE VERY BASICS LIKE FOOD, HOUSING, MEDICAL CARE, AND HEATING?: NOT VERY HARD

## 2024-08-14 ASSESSMENT — ENCOUNTER SYMPTOMS
DIARRHEA: 0
NAUSEA: 0
SINUS PAIN: 0
ABDOMINAL PAIN: 0
VOMITING: 0
SHORTNESS OF BREATH: 0
CONSTIPATION: 0
SINUS PRESSURE: 0

## 2024-08-14 NOTE — PROGRESS NOTES
8/14/2024 10:21 AM  Location:[facility] PHYSICIAN SERVICES  [facility] [place]  [place]  Patient #:  [id number]  YOB: 1945          YOUR LAST HEMOGLOBIN A1CS:   No results found for: \"HBA1C\", \"[id number]\"    YOUR LAST LIPID PROFILE:   Lab Results   Component Value Date/Time    CHOL 133 04/01/2021 04:17 AM    HDL 52 04/01/2021 04:17 AM    LDL 64 04/01/2021 04:17 AM    VLDL 17 04/01/2021 04:17 AM         Lab Results   Component Value Date/Time    GFRAA >60 08/18/2022 02:56 PM    BUN 20 08/03/2023 08:53 AM     08/03/2023 08:53 AM    K 3.9 08/03/2023 08:53 AM     08/03/2023 08:53 AM    CO2 30 08/03/2023 08:53 AM           History of Present Illness     Chief Complaint   Patient presents with    6 Month Follow-Up     Patient presents in the office today for a 6 month follow up.  Patient would like to discuss Arthritis and Neuropathy.  Patient states he feels sore when waking up in the mornings.     Fatigue     Patient concerned about increased fatigue.       Mr. Rainey is a 78 y.o. male  who presents for the above mentioned complaints.  Mr. Rainey has a history of pancreatitis, GERD, BPH, ulcerative colitis, TIA on DAPT, neuropathy, BPH, depression.  He is due for 6-month follow-up.  Reports neuropathy is worse and soreness to abdomen after doing pool exercises.   Sees GI for UC.  Sees ENT for sinus polyps. Was started on Dupixent for this.   Notes his blood pressure is mostly below 140/80 when he checks it at home.  Is aggravated today because he came to his appointment way too early.  Is resistant to beginning blood pressure medication and does not feel that he needs it.  Reports he has started a nerve medication which is helping his neuropathy.  There is magnesium as well as marshmallow root in it which is helpful.  Denies any bowel or bladder changes, chest pain, shortness of breath, leg swelling, palpitations, headaches.      Fatigue  This is a chronic problem. Associated

## 2024-08-19 DIAGNOSIS — Z00.00 ROUTINE GENERAL MEDICAL EXAMINATION AT A HEALTH CARE FACILITY: ICD-10-CM

## 2024-08-19 DIAGNOSIS — R53.83 OTHER FATIGUE: ICD-10-CM

## 2024-08-19 DIAGNOSIS — N40.1 BENIGN PROSTATIC HYPERPLASIA WITH LOWER URINARY TRACT SYMPTOMS, SYMPTOM DETAILS UNSPECIFIED: ICD-10-CM

## 2024-08-19 DIAGNOSIS — G45.9 TIA (TRANSIENT ISCHEMIC ATTACK): ICD-10-CM

## 2024-08-19 DIAGNOSIS — K51.90 ULCERATIVE COLITIS WITHOUT COMPLICATIONS, UNSPECIFIED LOCATION (HCC): ICD-10-CM

## 2024-08-19 DIAGNOSIS — Z12.5 ENCOUNTER FOR SCREENING FOR MALIGNANT NEOPLASM OF PROSTATE: ICD-10-CM

## 2024-08-19 LAB
ALBUMIN SERPL-MCNC: 3.7 G/DL (ref 3.2–4.6)
ALBUMIN/GLOB SERPL: 1.4 (ref 1–1.9)
ALP SERPL-CCNC: 69 U/L (ref 40–129)
ALT SERPL-CCNC: 47 U/L (ref 12–65)
ANION GAP SERPL CALC-SCNC: 12 MMOL/L (ref 9–18)
AST SERPL-CCNC: 52 U/L (ref 15–37)
BASOPHILS # BLD: 0.1 K/UL (ref 0–0.2)
BASOPHILS NFR BLD: 1 % (ref 0–2)
BILIRUB SERPL-MCNC: 0.9 MG/DL (ref 0–1.2)
BUN SERPL-MCNC: 19 MG/DL (ref 8–23)
CALCIUM SERPL-MCNC: 9.1 MG/DL (ref 8.8–10.2)
CHLORIDE SERPL-SCNC: 104 MMOL/L (ref 98–107)
CHOLEST SERPL-MCNC: 192 MG/DL (ref 0–200)
CO2 SERPL-SCNC: 24 MMOL/L (ref 20–28)
CREAT SERPL-MCNC: 1.04 MG/DL (ref 0.8–1.3)
DIFFERENTIAL METHOD BLD: ABNORMAL
EOSINOPHIL # BLD: 0.6 K/UL (ref 0–0.8)
EOSINOPHIL NFR BLD: 9 % (ref 0.5–7.8)
ERYTHROCYTE [DISTWIDTH] IN BLOOD BY AUTOMATED COUNT: 11.9 % (ref 11.9–14.6)
GLOBULIN SER CALC-MCNC: 2.7 G/DL (ref 2.3–3.5)
GLUCOSE SERPL-MCNC: 124 MG/DL (ref 70–99)
HCT VFR BLD AUTO: 44.8 % (ref 41.1–50.3)
HDLC SERPL-MCNC: 48 MG/DL (ref 40–60)
HDLC SERPL: 4 (ref 0–5)
HGB BLD-MCNC: 15.9 G/DL (ref 13.6–17.2)
IMM GRANULOCYTES # BLD AUTO: 0 K/UL (ref 0–0.5)
IMM GRANULOCYTES NFR BLD AUTO: 0 % (ref 0–5)
LDLC SERPL CALC-MCNC: 122 MG/DL (ref 0–100)
LYMPHOCYTES # BLD: 1.6 K/UL (ref 0.5–4.6)
LYMPHOCYTES NFR BLD: 23 % (ref 13–44)
MCH RBC QN AUTO: 36.1 PG (ref 26.1–32.9)
MCHC RBC AUTO-ENTMCNC: 35.5 G/DL (ref 31.4–35)
MCV RBC AUTO: 101.8 FL (ref 82–102)
MONOCYTES # BLD: 0.6 K/UL (ref 0.1–1.3)
MONOCYTES NFR BLD: 8 % (ref 4–12)
NEUTS SEG # BLD: 3.9 K/UL (ref 1.7–8.2)
NEUTS SEG NFR BLD: 59 % (ref 43–78)
NRBC # BLD: 0 K/UL (ref 0–0.2)
PLATELET # BLD AUTO: 196 K/UL (ref 150–450)
PMV BLD AUTO: 10.7 FL (ref 9.4–12.3)
POTASSIUM SERPL-SCNC: 4.4 MMOL/L (ref 3.5–5.1)
PROT SERPL-MCNC: 6.4 G/DL (ref 6.3–8.2)
PSA SERPL-MCNC: 0.7 NG/ML (ref 0–4)
RBC # BLD AUTO: 4.4 M/UL (ref 4.23–5.6)
SODIUM SERPL-SCNC: 140 MMOL/L (ref 136–145)
TRIGL SERPL-MCNC: 107 MG/DL (ref 0–150)
TSH W FREE THYROID IF ABNORMAL: 2.09 UIU/ML (ref 0.27–4.2)
VLDLC SERPL CALC-MCNC: 21 MG/DL (ref 6–23)
WBC # BLD AUTO: 6.7 K/UL (ref 4.3–11.1)

## 2024-08-20 ENCOUNTER — TELEPHONE (OUTPATIENT)
Dept: INTERNAL MEDICINE CLINIC | Facility: CLINIC | Age: 79
End: 2024-08-20

## 2024-08-20 DIAGNOSIS — R73.09 ELEVATED GLUCOSE: Primary | ICD-10-CM

## 2024-08-20 DIAGNOSIS — E78.2 MIXED HYPERLIPIDEMIA: ICD-10-CM

## 2024-08-20 DIAGNOSIS — R73.09 ELEVATED GLUCOSE: ICD-10-CM

## 2024-08-20 LAB
EST. AVERAGE GLUCOSE BLD GHB EST-MCNC: 127 MG/DL
HBA1C MFR BLD: 6.1 % (ref 0–5.6)

## 2024-08-20 RX ORDER — ATORVASTATIN CALCIUM 20 MG/1
20 TABLET, FILM COATED ORAL DAILY
Qty: 90 TABLET | Refills: 0 | Status: SHIPPED | OUTPATIENT
Start: 2024-08-20

## 2024-08-20 NOTE — TELEPHONE ENCOUNTER
Mr. Rainey-  The labs show that your cholesterol is not quite as controlled. Are you taking the Lipitor every day?   Please ensure that you are and also that you are eating a low processed and sugar diet with low saturated fat.  Also, your glucose looks to be trending up. I am going to add on a test for diabetes.   (Can we add on A1c? I ordered.)    All other labs are stable.     How are your home blood pressure readings?   Pamela

## 2024-08-20 NOTE — TELEPHONE ENCOUNTER
I spoke with patient, he states he does not have a prescription for Atorvastatin, he looked through his medication cabinet while on the phone and still stated he did not have Atorvastatin, however he would like a prescription for the medication to help his cholesterol.  A1c add on given to lab.    Patient says he has been taking Nyquil and BP readings he had were 166/88, 172/97, 170/91, 168/92.  However, he states when he did not take the Nyquil his BP was 149/86, so he says he is going to quit taking Nyquil to see if that helps bring his BP down.

## 2024-08-20 NOTE — TELEPHONE ENCOUNTER
Please let him know that I called in the atorvastatin for his cholesterol. I would like to follow up on his blood pressure.   Have him continue to monitor and he should return in 1 month unless Bps are very high, then I can see him any time.   Bring all meds and BP cuff to appointment. Thanks!  Pamela

## 2024-09-10 RX ORDER — PANTOPRAZOLE SODIUM 40 MG/1
40 TABLET, DELAYED RELEASE ORAL
Qty: 90 TABLET | Refills: 3 | Status: CANCELLED | OUTPATIENT
Start: 2024-09-10

## 2024-09-12 RX ORDER — PANTOPRAZOLE SODIUM 40 MG/1
40 TABLET, DELAYED RELEASE ORAL DAILY
COMMUNITY

## 2024-09-23 ENCOUNTER — OFFICE VISIT (OUTPATIENT)
Dept: INTERNAL MEDICINE CLINIC | Facility: CLINIC | Age: 79
End: 2024-09-23
Payer: MEDICARE

## 2024-09-23 VITALS
TEMPERATURE: 96.8 F | RESPIRATION RATE: 16 BRPM | SYSTOLIC BLOOD PRESSURE: 154 MMHG | WEIGHT: 217.6 LBS | DIASTOLIC BLOOD PRESSURE: 84 MMHG | HEART RATE: 59 BPM | OXYGEN SATURATION: 97 % | BODY MASS INDEX: 34.15 KG/M2 | HEIGHT: 67 IN

## 2024-09-23 DIAGNOSIS — I10 PRIMARY HYPERTENSION: Primary | ICD-10-CM

## 2024-09-23 PROCEDURE — 1036F TOBACCO NON-USER: CPT | Performed by: NURSE PRACTITIONER

## 2024-09-23 PROCEDURE — G8427 DOCREV CUR MEDS BY ELIG CLIN: HCPCS | Performed by: NURSE PRACTITIONER

## 2024-09-23 PROCEDURE — 1123F ACP DISCUSS/DSCN MKR DOCD: CPT | Performed by: NURSE PRACTITIONER

## 2024-09-23 PROCEDURE — 3078F DIAST BP <80 MM HG: CPT | Performed by: NURSE PRACTITIONER

## 2024-09-23 PROCEDURE — 3077F SYST BP >= 140 MM HG: CPT | Performed by: NURSE PRACTITIONER

## 2024-09-23 PROCEDURE — G8417 CALC BMI ABV UP PARAM F/U: HCPCS | Performed by: NURSE PRACTITIONER

## 2024-09-23 PROCEDURE — 99214 OFFICE O/P EST MOD 30 MIN: CPT | Performed by: NURSE PRACTITIONER

## 2024-09-23 RX ORDER — TOBRAMYCIN AND DEXAMETHASONE 3; 1 MG/ML; MG/ML
SUSPENSION/ DROPS OPHTHALMIC
COMMUNITY
Start: 2024-09-12

## 2024-09-23 RX ORDER — LOSARTAN POTASSIUM 50 MG/1
50 TABLET ORAL DAILY
Qty: 30 TABLET | Refills: 5 | Status: SHIPPED | OUTPATIENT
Start: 2024-09-23

## 2024-09-23 RX ORDER — FLUTICASONE PROPIONATE 50 MCG
SPRAY, SUSPENSION (ML) NASAL
COMMUNITY
Start: 2024-09-10

## 2024-09-23 ASSESSMENT — ENCOUNTER SYMPTOMS: SHORTNESS OF BREATH: 0

## 2024-09-23 ASSESSMENT — PATIENT HEALTH QUESTIONNAIRE - PHQ9
SUM OF ALL RESPONSES TO PHQ QUESTIONS 1-9: 0
SUM OF ALL RESPONSES TO PHQ QUESTIONS 1-9: 0
1. LITTLE INTEREST OR PLEASURE IN DOING THINGS: NOT AT ALL
SUM OF ALL RESPONSES TO PHQ QUESTIONS 1-9: 0
SUM OF ALL RESPONSES TO PHQ QUESTIONS 1-9: 0

## 2024-11-04 ENCOUNTER — OFFICE VISIT (OUTPATIENT)
Dept: INTERNAL MEDICINE CLINIC | Facility: CLINIC | Age: 79
End: 2024-11-04
Payer: MEDICARE

## 2024-11-04 VITALS
SYSTOLIC BLOOD PRESSURE: 154 MMHG | TEMPERATURE: 97 F | DIASTOLIC BLOOD PRESSURE: 74 MMHG | OXYGEN SATURATION: 99 % | BODY MASS INDEX: 34.21 KG/M2 | HEIGHT: 67 IN | WEIGHT: 218 LBS | HEART RATE: 54 BPM

## 2024-11-04 DIAGNOSIS — I10 PRIMARY HYPERTENSION: ICD-10-CM

## 2024-11-04 LAB
ANION GAP SERPL CALC-SCNC: 11 MMOL/L (ref 7–16)
BUN SERPL-MCNC: 17 MG/DL (ref 8–23)
CALCIUM SERPL-MCNC: 9 MG/DL (ref 8.8–10.2)
CHLORIDE SERPL-SCNC: 104 MMOL/L (ref 98–107)
CO2 SERPL-SCNC: 25 MMOL/L (ref 20–29)
CREAT SERPL-MCNC: 1.07 MG/DL (ref 0.8–1.3)
GLUCOSE SERPL-MCNC: 139 MG/DL (ref 70–99)
POTASSIUM SERPL-SCNC: 4.5 MMOL/L (ref 3.5–5.1)
SODIUM SERPL-SCNC: 140 MMOL/L (ref 136–145)

## 2024-11-04 PROCEDURE — 1160F RVW MEDS BY RX/DR IN RCRD: CPT | Performed by: NURSE PRACTITIONER

## 2024-11-04 PROCEDURE — 1159F MED LIST DOCD IN RCRD: CPT | Performed by: NURSE PRACTITIONER

## 2024-11-04 PROCEDURE — 3078F DIAST BP <80 MM HG: CPT | Performed by: NURSE PRACTITIONER

## 2024-11-04 PROCEDURE — 3077F SYST BP >= 140 MM HG: CPT | Performed by: NURSE PRACTITIONER

## 2024-11-04 PROCEDURE — 99212 OFFICE O/P EST SF 10 MIN: CPT | Performed by: NURSE PRACTITIONER

## 2024-11-04 PROCEDURE — 1123F ACP DISCUSS/DSCN MKR DOCD: CPT | Performed by: NURSE PRACTITIONER

## 2024-11-04 RX ORDER — LOSARTAN POTASSIUM 100 MG/1
100 TABLET ORAL DAILY
Qty: 90 TABLET | Refills: 3 | Status: SHIPPED | OUTPATIENT
Start: 2024-11-04 | End: 2024-11-04

## 2024-11-04 ASSESSMENT — ENCOUNTER SYMPTOMS
CHEST TIGHTNESS: 0
SHORTNESS OF BREATH: 0

## 2024-11-04 NOTE — PROGRESS NOTES
[facility] Internal Medicine  [address] [address]     Office Visit    Stanislav Rainey   1945 11/04/24       Subjective:     Chief Complaint   Patient presents with    Hypertension     6 week follow up        History of Present illness:  Mr. Rainey is a 79 y.o. male with PMH of  cardiovascular disease, ulcerative colitis, BPH, and HTN who presents for follow up on HTN. SBP's at home consistently >140. 6 weeks ago seen by another NP in practice and started on Losartan. He has a bp log with blood pressures prior to medication and occasional BP later in day (medicated). Appears uncontrolled. Today, BP is elevated but he did not take his medication yet.       He has been taking extra potassium bc he heard it was good for brain health. He will stop taking it the potassium supplement.     Objective:     Allergies:    Allergies   Allergen Reactions    Latex Other (See Comments)     \"it burns\"     Cat Hair Extract Itching    Pollen Extract Other (See Comments)     DUST  POLLENS  MOLD SPORES  Rhinitis      Molds & Smuts Itching and Other (See Comments)     Rhinitis          Medical History:    Past Medical History:   Diagnosis Date    Acute pancreatitis 8/17/2015    Allergic rhinitis 12/6/2015    Arthritis     OA    Backache, unspecified 8/17/2015    Benign neoplasm of colon 08/17/2015    hx    Benign neoplasm of other specified sites of skin 8/17/2015    Benign non-nodular prostatic hyperplasia with lower urinary tract symptoms 8/17/2015    Cervical arthritis 5/30/2017    Chronic sinusitis 8/17/2015    Depressive disorder, not elsewhere classified 8/17/2015    Deviated nasal septum 9/20/2016    Esophageal reflux 8/17/2015    Family history of sudden cardiac death     GERD (gastroesophageal reflux disease)     controlled w/med    History of COVID-19 03/2020    \"flu like symptoms\", HA    Hypertrophy of prostate with urinary obstruction and other lower urinary tract symptoms (LUTS)

## 2024-11-15 DIAGNOSIS — E78.2 MIXED HYPERLIPIDEMIA: ICD-10-CM

## 2024-11-15 RX ORDER — ATORVASTATIN CALCIUM 20 MG/1
20 TABLET, FILM COATED ORAL DAILY
Qty: 90 TABLET | Refills: 3 | Status: SHIPPED | OUTPATIENT
Start: 2024-11-15

## 2024-12-10 ENCOUNTER — OFFICE VISIT (OUTPATIENT)
Dept: INTERNAL MEDICINE CLINIC | Facility: CLINIC | Age: 79
End: 2024-12-10

## 2024-12-10 VITALS
OXYGEN SATURATION: 98 % | TEMPERATURE: 97.5 F | HEART RATE: 61 BPM | BODY MASS INDEX: 35.6 KG/M2 | WEIGHT: 226.8 LBS | RESPIRATION RATE: 16 BRPM | HEIGHT: 67 IN | DIASTOLIC BLOOD PRESSURE: 82 MMHG | SYSTOLIC BLOOD PRESSURE: 164 MMHG

## 2024-12-10 DIAGNOSIS — M19.90 ARTHRITIS: ICD-10-CM

## 2024-12-10 DIAGNOSIS — Z12.11 SCREENING FOR COLON CANCER: ICD-10-CM

## 2024-12-10 DIAGNOSIS — I10 PRIMARY HYPERTENSION: Primary | ICD-10-CM

## 2024-12-10 RX ORDER — LOSARTAN POTASSIUM 50 MG/1
50 TABLET ORAL DAILY
Qty: 90 TABLET | Refills: 3 | Status: SHIPPED | OUTPATIENT
Start: 2024-12-10

## 2024-12-10 RX ORDER — CELECOXIB 200 MG/1
200 CAPSULE ORAL DAILY
Qty: 90 CAPSULE | Refills: 3 | Status: SHIPPED | OUTPATIENT
Start: 2024-12-10

## 2024-12-10 NOTE — PROGRESS NOTES
[facility]  [address] [address]     Office Visit    Stanislav Rainey   1945   12/10/24       Subjective:     Chief Complaint   Patient presents with    Hypertension        History of Present illness:  Mr. Rainey is a 79 y.o. male with PMH of  cardiovascular disease, ulcerative colitis, BPH, and HTN  who presents for follow up on HTN. He just returned from a 2.5 week trip and did not take his bp during the time. He feels like his bp is up r/t the trip. He doesn't think he is taking the Losartan. He will look when he gets home. If not, he will start taking it. He will monitor BP.     He does not want to take atorvastatin. Risks discussed of not taking. Education provided on use of Qunol Plus. He doesn't want to do that. Discussed he needs to follow up with his PCP and discuss with him his concerns.     Requested referral to GI for repeat colonoscopy.     He wanted to discuss a recent event regarding his wife not being able to get an appointment and became confrontational. Offered to get patient advocate phone number for him, he declined. Encouraged follow up with , he declined. Discussed with PCP and .    Objective:     Allergies:    Allergies   Allergen Reactions    Latex Other (See Comments)     \"it burns\"     Cat Hair Extract Itching    Pollen Extract Other (See Comments)     DUST  POLLENS  MOLD SPORES  Rhinitis      Molds & Smuts Itching and Other (See Comments)     Rhinitis          Medical History:    Past Medical History:   Diagnosis Date    Acute pancreatitis 8/17/2015    Allergic rhinitis 12/6/2015    Arthritis     OA    Backache, unspecified 8/17/2015    Benign neoplasm of colon 08/17/2015    hx    Benign neoplasm of other specified sites of skin 8/17/2015    Benign non-nodular prostatic hyperplasia with lower urinary tract symptoms 8/17/2015    Cervical arthritis 5/30/2017    Chronic sinusitis 8/17/2015    Depressive

## 2025-01-07 ENCOUNTER — OFFICE VISIT (OUTPATIENT)
Dept: INTERNAL MEDICINE CLINIC | Facility: CLINIC | Age: 80
End: 2025-01-07

## 2025-01-07 VITALS
BODY MASS INDEX: 34.75 KG/M2 | SYSTOLIC BLOOD PRESSURE: 138 MMHG | HEIGHT: 67 IN | OXYGEN SATURATION: 98 % | TEMPERATURE: 97.4 F | HEART RATE: 61 BPM | WEIGHT: 221.4 LBS | DIASTOLIC BLOOD PRESSURE: 82 MMHG

## 2025-01-07 DIAGNOSIS — I10 PRIMARY HYPERTENSION: Primary | ICD-10-CM

## 2025-01-07 DIAGNOSIS — M25.551 BILATERAL HIP PAIN: ICD-10-CM

## 2025-01-07 DIAGNOSIS — M25.561 CHRONIC PAIN OF RIGHT KNEE: ICD-10-CM

## 2025-01-07 DIAGNOSIS — G89.29 CHRONIC PAIN OF RIGHT KNEE: ICD-10-CM

## 2025-01-07 DIAGNOSIS — G62.9 NEUROPATHY: ICD-10-CM

## 2025-01-07 DIAGNOSIS — F41.9 ANXIETY: ICD-10-CM

## 2025-01-07 DIAGNOSIS — M25.552 BILATERAL HIP PAIN: ICD-10-CM

## 2025-01-07 RX ORDER — GABAPENTIN 100 MG/1
100 CAPSULE ORAL NIGHTLY
Qty: 90 CAPSULE | Refills: 1 | Status: SHIPPED | OUTPATIENT
Start: 2025-01-07 | End: 2025-07-06

## 2025-01-07 ASSESSMENT — ENCOUNTER SYMPTOMS
CONSTIPATION: 0
SHORTNESS OF BREATH: 0
ABDOMINAL PAIN: 0
NAUSEA: 0
COUGH: 0
DIARRHEA: 0
ABDOMINAL DISTENTION: 0
CHEST TIGHTNESS: 0
VOMITING: 0

## 2025-01-07 ASSESSMENT — PATIENT HEALTH QUESTIONNAIRE - PHQ9
SUM OF ALL RESPONSES TO PHQ9 QUESTIONS 1 & 2: 0
SUM OF ALL RESPONSES TO PHQ QUESTIONS 1-9: 0
1. LITTLE INTEREST OR PLEASURE IN DOING THINGS: NOT AT ALL
2. FEELING DOWN, DEPRESSED OR HOPELESS: NOT AT ALL
SUM OF ALL RESPONSES TO PHQ QUESTIONS 1-9: 0

## 2025-01-07 NOTE — PROGRESS NOTES
[facility] Internal Medicine  [address] [address]     Office Visit    Stanislav Rainey   1945 01/07/25       Subjective:     Chief Complaint   Patient presents with    Hypertension     4 week follow up         History of Present illness:  Mr. Rainey is a 79 y.o. male with PMH of  cardiovascular disease, ulcerative colitis, BPH, and HTN  who presents for follow up on HTN. He states the HTN is not bothering him but the stiffness in hips and neuropathy in feet. Has gabapentin 300mg at home but has not been taking. Has pain in bilateral hips. He declines therapy and states he will get back to the gym, stretching, and exercising to see if it will help.      HTN- BP mostly uncontrolled at home. He has not been taking Losartan.     He is having some difficulty dealing with failing health. He is been having to care for her w/o caregiver adding strain.     Objective:     Allergies:    Allergies   Allergen Reactions    Latex Other (See Comments)     \"it webb\"     Cat Dander Itching    Pollen Extract Other (See Comments)     DUST  POLLENS  MOLD SPORES  Rhinitis      Molds & Smuts Itching and Other (See Comments)     Rhinitis          Medical History:    Past Medical History:   Diagnosis Date    Acute pancreatitis 8/17/2015    Allergic rhinitis 12/6/2015    Arthritis     OA    Backache, unspecified 8/17/2015    Benign neoplasm of colon 08/17/2015    hx    Benign neoplasm of other specified sites of skin 8/17/2015    Benign non-nodular prostatic hyperplasia with lower urinary tract symptoms 8/17/2015    Cervical arthritis 5/30/2017    Chronic sinusitis 8/17/2015    Depressive disorder, not elsewhere classified 8/17/2015    Deviated nasal septum 9/20/2016    Esophageal reflux 8/17/2015    Family history of sudden cardiac death     GERD (gastroesophageal reflux disease)     controlled w/med    History of COVID-19 03/2020    \"flu like symptoms\", HA    Hypertrophy of prostate with urinary

## 2025-02-12 RX ORDER — OLSALAZINE SODIUM 250 MG/1
CAPSULE, GELATIN COATED ORAL
Qty: 180 CAPSULE | Refills: 3 | Status: SHIPPED | OUTPATIENT
Start: 2025-02-12

## 2025-02-14 ENCOUNTER — OFFICE VISIT (OUTPATIENT)
Dept: INTERNAL MEDICINE CLINIC | Facility: CLINIC | Age: 80
End: 2025-02-14

## 2025-02-14 VITALS
DIASTOLIC BLOOD PRESSURE: 78 MMHG | HEART RATE: 55 BPM | SYSTOLIC BLOOD PRESSURE: 158 MMHG | HEIGHT: 68 IN | RESPIRATION RATE: 16 BRPM | WEIGHT: 228.4 LBS | TEMPERATURE: 97 F | BODY MASS INDEX: 34.62 KG/M2

## 2025-02-14 DIAGNOSIS — R73.09 ELEVATED GLUCOSE: Primary | ICD-10-CM

## 2025-02-14 DIAGNOSIS — G62.9 PERIPHERAL POLYNEUROPATHY: ICD-10-CM

## 2025-02-14 DIAGNOSIS — K51.90 ULCERATIVE COLITIS WITHOUT COMPLICATIONS, UNSPECIFIED LOCATION (HCC): ICD-10-CM

## 2025-02-14 DIAGNOSIS — I10 PRIMARY HYPERTENSION: ICD-10-CM

## 2025-02-14 DIAGNOSIS — H90.71 MIXED CONDUCTIVE AND SENSORINEURAL HEARING LOSS OF RIGHT EAR, UNSPECIFIED HEARING STATUS ON CONTRALATERAL SIDE: ICD-10-CM

## 2025-02-14 DIAGNOSIS — R73.09 ELEVATED GLUCOSE: ICD-10-CM

## 2025-02-14 DIAGNOSIS — N40.1 BENIGN PROSTATIC HYPERPLASIA WITH LOWER URINARY TRACT SYMPTOMS, SYMPTOM DETAILS UNSPECIFIED: ICD-10-CM

## 2025-02-14 DIAGNOSIS — I65.23 BILATERAL CAROTID ARTERY STENOSIS: ICD-10-CM

## 2025-02-14 DIAGNOSIS — I67.9 CEREBROVASCULAR DISEASE: ICD-10-CM

## 2025-02-14 DIAGNOSIS — Z00.00 MEDICARE ANNUAL WELLNESS VISIT, SUBSEQUENT: ICD-10-CM

## 2025-02-14 PROBLEM — M54.2 NECK PAIN: Status: RESOLVED | Noted: 2024-02-13 | Resolved: 2025-02-14

## 2025-02-14 LAB
ALT SERPL-CCNC: 62 U/L (ref 8–55)
ANION GAP SERPL CALC-SCNC: 11 MMOL/L (ref 7–16)
BUN SERPL-MCNC: 20 MG/DL (ref 8–23)
CALCIUM SERPL-MCNC: 9 MG/DL (ref 8.8–10.2)
CHLORIDE SERPL-SCNC: 105 MMOL/L (ref 98–107)
CHOLEST SERPL-MCNC: 124 MG/DL (ref 0–200)
CO2 SERPL-SCNC: 25 MMOL/L (ref 20–29)
CREAT SERPL-MCNC: 1.17 MG/DL (ref 0.8–1.3)
EST. AVERAGE GLUCOSE BLD GHB EST-MCNC: 132 MG/DL
GLUCOSE SERPL-MCNC: 129 MG/DL (ref 70–99)
HBA1C MFR BLD: 6.2 % (ref 0–5.6)
HDLC SERPL-MCNC: 59 MG/DL (ref 40–60)
HDLC SERPL: 2.1 (ref 0–5)
LDLC SERPL CALC-MCNC: 54 MG/DL (ref 0–100)
POTASSIUM SERPL-SCNC: 4.7 MMOL/L (ref 3.5–5.1)
SODIUM SERPL-SCNC: 141 MMOL/L (ref 136–145)
TRIGL SERPL-MCNC: 54 MG/DL (ref 0–150)
VLDLC SERPL CALC-MCNC: 11 MG/DL (ref 6–23)

## 2025-02-14 SDOH — ECONOMIC STABILITY: FOOD INSECURITY: WITHIN THE PAST 12 MONTHS, THE FOOD YOU BOUGHT JUST DIDN'T LAST AND YOU DIDN'T HAVE MONEY TO GET MORE.: NEVER TRUE

## 2025-02-14 SDOH — ECONOMIC STABILITY: FOOD INSECURITY: WITHIN THE PAST 12 MONTHS, YOU WORRIED THAT YOUR FOOD WOULD RUN OUT BEFORE YOU GOT MONEY TO BUY MORE.: NEVER TRUE

## 2025-02-14 ASSESSMENT — PATIENT HEALTH QUESTIONNAIRE - PHQ9
SUM OF ALL RESPONSES TO PHQ QUESTIONS 1-9: 0
2. FEELING DOWN, DEPRESSED OR HOPELESS: NOT AT ALL
SUM OF ALL RESPONSES TO PHQ QUESTIONS 1-9: 0
SUM OF ALL RESPONSES TO PHQ9 QUESTIONS 1 & 2: 0
SUM OF ALL RESPONSES TO PHQ QUESTIONS 1-9: 0
1. LITTLE INTEREST OR PLEASURE IN DOING THINGS: NOT AT ALL
SUM OF ALL RESPONSES TO PHQ QUESTIONS 1-9: 0

## 2025-02-14 ASSESSMENT — LIFESTYLE VARIABLES
HOW MANY STANDARD DRINKS CONTAINING ALCOHOL DO YOU HAVE ON A TYPICAL DAY: 1 OR 2
HOW OFTEN DO YOU HAVE A DRINK CONTAINING ALCOHOL: 2-3 TIMES A WEEK

## 2025-02-14 NOTE — PROGRESS NOTES
nightly as needed (neck pain)  Patient not taking: Reported on 2/14/2025  Cedric Armas, APRN - CNP       CareTeam (Including outside providers/suppliers regularly involved in providing care):   Patient Care Team:  Graham Hartmann MD as PCP - General  Graham Hartmann MD as PCP - Empaneled Provider  Parviz Trimble DO as Physician     Recommendations for Preventive Services Due: see orders and patient instructions/AVS.  Recommended screening schedule for the next 5-10 years is provided to the patient in written form: see Patient Instructions/AVS.     Reviewed and updated this visit:  Tobacco  Allergies  Meds  Sexual Hx

## 2025-02-17 ENCOUNTER — TELEPHONE (OUTPATIENT)
Dept: INTERNAL MEDICINE CLINIC | Facility: CLINIC | Age: 80
End: 2025-02-17

## 2025-02-17 DIAGNOSIS — F43.21 GRIEF: Primary | ICD-10-CM

## 2025-02-17 RX ORDER — LORAZEPAM 0.5 MG/1
0.5 TABLET ORAL EVERY 8 HOURS PRN
Qty: 30 TABLET | Refills: 0 | Status: SHIPPED | OUTPATIENT
Start: 2025-02-17 | End: 2025-02-27

## 2025-02-17 NOTE — TELEPHONE ENCOUNTER
Mr. Rainey's wife passed away yesterday- he needs something for axiety- feels heavy chested just due to his grief. Asked for something to be called in for him. Uses [facility] in [place].

## 2025-02-17 NOTE — TELEPHONE ENCOUNTER
Roseline Guaman, APRN - NP  You1 hour ago (12:53 PM)       Sent in Ativan for him. Please let him know that it could make him sleepy and we are so sorry for his loss. FRED

## 2025-02-19 ENCOUNTER — TELEPHONE (OUTPATIENT)
Dept: INTERNAL MEDICINE CLINIC | Facility: CLINIC | Age: 80
End: 2025-02-19

## 2025-02-19 NOTE — TELEPHONE ENCOUNTER
Mr Brigid's wife had passed away on Sunday morning. He is depressed and is stating that he wants to be with her, and is crying all of the time.  Merced, the caregiver is concerned that he may try to do something to himself. They have removed all the guns and medications that he could potentially hurt himself with. I have him on the schedule with [facility] on Tuesday next week. If someone calls and cancels on Friday, Merced would like a call back to put him on the schedule.

## 2025-02-25 ENCOUNTER — OFFICE VISIT (OUTPATIENT)
Dept: INTERNAL MEDICINE CLINIC | Facility: CLINIC | Age: 80
End: 2025-02-25

## 2025-02-25 VITALS
SYSTOLIC BLOOD PRESSURE: 164 MMHG | BODY MASS INDEX: 34.01 KG/M2 | HEIGHT: 68 IN | DIASTOLIC BLOOD PRESSURE: 74 MMHG | HEART RATE: 57 BPM | RESPIRATION RATE: 18 BRPM | OXYGEN SATURATION: 95 % | WEIGHT: 224.4 LBS | TEMPERATURE: 97.8 F

## 2025-02-25 DIAGNOSIS — R06.02 SOB (SHORTNESS OF BREATH): Primary | ICD-10-CM

## 2025-02-25 DIAGNOSIS — I10 PRIMARY HYPERTENSION: ICD-10-CM

## 2025-02-25 DIAGNOSIS — R07.89 CHEST PRESSURE: ICD-10-CM

## 2025-02-25 DIAGNOSIS — R06.02 SOB (SHORTNESS OF BREATH): ICD-10-CM

## 2025-02-25 DIAGNOSIS — F43.21 GRIEF: ICD-10-CM

## 2025-02-25 DIAGNOSIS — L60.0 INGROWN TOENAIL OF LEFT FOOT: ICD-10-CM

## 2025-02-25 DIAGNOSIS — F41.9 ANXIETY: ICD-10-CM

## 2025-02-25 LAB
ALBUMIN SERPL-MCNC: 3.6 G/DL (ref 3.2–4.6)
ALBUMIN/GLOB SERPL: 1.3 (ref 1–1.9)
ALP SERPL-CCNC: 79 U/L (ref 40–129)
ALT SERPL-CCNC: 64 U/L (ref 8–55)
ANION GAP SERPL CALC-SCNC: 11 MMOL/L (ref 7–16)
AST SERPL-CCNC: 53 U/L (ref 15–37)
BASOPHILS # BLD: 0.07 K/UL (ref 0–0.2)
BASOPHILS NFR BLD: 1.1 % (ref 0–2)
BILIRUB SERPL-MCNC: 0.8 MG/DL (ref 0–1.2)
BUN SERPL-MCNC: 19 MG/DL (ref 8–23)
CALCIUM SERPL-MCNC: 9 MG/DL (ref 8.8–10.2)
CHLORIDE SERPL-SCNC: 105 MMOL/L (ref 98–107)
CO2 SERPL-SCNC: 25 MMOL/L (ref 20–29)
CREAT SERPL-MCNC: 1.12 MG/DL (ref 0.8–1.3)
D DIMER PPP FEU-MCNC: 0.32 UG/ML(FEU)
DIFFERENTIAL METHOD BLD: ABNORMAL
EOSINOPHIL # BLD: 0.4 K/UL (ref 0–0.8)
EOSINOPHIL NFR BLD: 6 % (ref 0.5–7.8)
ERYTHROCYTE [DISTWIDTH] IN BLOOD BY AUTOMATED COUNT: 12.3 % (ref 11.9–14.6)
GLOBULIN SER CALC-MCNC: 2.8 G/DL (ref 2.3–3.5)
GLUCOSE SERPL-MCNC: 139 MG/DL (ref 70–99)
HCT VFR BLD AUTO: 43.3 % (ref 41.1–50.3)
HGB BLD-MCNC: 15.4 G/DL (ref 13.6–17.2)
IMM GRANULOCYTES # BLD AUTO: 0.01 K/UL (ref 0–0.5)
IMM GRANULOCYTES NFR BLD AUTO: 0.2 % (ref 0–5)
LYMPHOCYTES # BLD: 1.42 K/UL (ref 0.5–4.6)
LYMPHOCYTES NFR BLD: 21.3 % (ref 13–44)
MAGNESIUM SERPL-MCNC: 2.3 MG/DL (ref 1.8–2.4)
MCH RBC QN AUTO: 36.1 PG (ref 26.1–32.9)
MCHC RBC AUTO-ENTMCNC: 35.6 G/DL (ref 31.4–35)
MCV RBC AUTO: 101.4 FL (ref 82–102)
MONOCYTES # BLD: 0.59 K/UL (ref 0.1–1.3)
MONOCYTES NFR BLD: 8.9 % (ref 4–12)
NEUTS SEG # BLD: 4.17 K/UL (ref 1.7–8.2)
NEUTS SEG NFR BLD: 62.5 % (ref 43–78)
NRBC # BLD: 0 K/UL (ref 0–0.2)
NT PRO BNP: 119 PG/ML (ref 0–450)
PLATELET # BLD AUTO: 198 K/UL (ref 150–450)
PMV BLD AUTO: 10.4 FL (ref 9.4–12.3)
POTASSIUM SERPL-SCNC: 4.4 MMOL/L (ref 3.5–5.1)
PROT SERPL-MCNC: 6.4 G/DL (ref 6.3–8.2)
RBC # BLD AUTO: 4.27 M/UL (ref 4.23–5.6)
SODIUM SERPL-SCNC: 141 MMOL/L (ref 136–145)
TSH W FREE THYROID IF ABNORMAL: 2.1 UIU/ML (ref 0.27–4.2)
WBC # BLD AUTO: 6.7 K/UL (ref 4.3–11.1)

## 2025-02-25 RX ORDER — LORAZEPAM 1 MG/1
1 TABLET ORAL 3 TIMES DAILY PRN
Qty: 30 TABLET | Refills: 0 | Status: SHIPPED | OUTPATIENT
Start: 2025-02-25 | End: 2025-02-25

## 2025-02-25 RX ORDER — LORAZEPAM 1 MG/1
1 TABLET ORAL 2 TIMES DAILY PRN
Qty: 30 TABLET | Refills: 0 | Status: SHIPPED | OUTPATIENT
Start: 2025-02-25 | End: 2025-03-27

## 2025-02-25 RX ORDER — ESCITALOPRAM OXALATE 10 MG/1
10 TABLET ORAL DAILY
Qty: 30 TABLET | Refills: 5 | Status: SHIPPED | OUTPATIENT
Start: 2025-02-25

## 2025-02-25 ASSESSMENT — ENCOUNTER SYMPTOMS
COUGH: 0
CHEST TIGHTNESS: 1
ABDOMINAL PAIN: 0
SHORTNESS OF BREATH: 1

## 2025-02-25 NOTE — PROGRESS NOTES
time.           Assessment & Plan    Stanislav was seen today for nail problem and chest tightness.    Diagnoses and all orders for this visit:    SOB (shortness of breath)  -     EKG 12 Lead  -     CBC with Auto Differential; Future  -     Comprehensive Metabolic Panel; Future  -     Brain Natriuretic Peptide; Future  -     Magnesium; Future  -     TSH reflex to FT4; Future  -     D-Dimer, Quantitative; Future    Chest pressure  -     D-Dimer, Quantitative; Future    Anxiety  -     escitalopram (LEXAPRO) 10 MG tablet; Take 1 tablet by mouth daily Take 0.5 tab daily for the first week.    -     LORazepam (ATIVAN) 1 MG tablet; Take 1 tablet by mouth 2 times daily as needed for Anxiety for up to 30 days. Max Daily Amount: 2 mg    Grief    -     LORazepam (ATIVAN) 1 MG tablet; Take 1 tablet by mouth 2 times daily as needed for Anxiety for up to 30 days. Max Daily Amount: 2 mg    Primary hypertension  -     Magnesium; Future  -     TSH reflex to FT4; Future    Ingrown toenail of left foot       EKG is with nsr. No abnormality. Suspect anxiety/grief reaction. Increase lorazepam bid prn 1mg, also start ssri for now. Declines counseling, will be staying some with his son. Check lab as above. Advised seek emergent care if worsening sob or cp.    If still with sx next week will likely refer to cardio for further eval.   He has seen cardio in the past. Pts bp is elevated, likely 2/2 stress/anxiety, is not taking losartan. Advised him to check at home and re eval next week, may need to restart losartan. Pt states has been 130s syst at home lately.  Have him come back next week for follow up and ingrown toenail excision /ext visit and advised stop plavix for 3 days prior.     Orders Placed This Encounter   Procedures    CBC with Auto Differential     Standing Status:   Future     Number of Occurrences:   1     Standing Expiration Date:   2/25/2026    Comprehensive Metabolic Panel     Standing Status:   Future     Number of

## 2025-02-28 ENCOUNTER — TELEPHONE (OUTPATIENT)
Dept: INTERNAL MEDICINE CLINIC | Facility: CLINIC | Age: 80
End: 2025-02-28

## 2025-02-28 NOTE — TELEPHONE ENCOUNTER
Patients son, Anup, informed and relayed understanding.  States he will have to check to make sure they can come in on 7th, will call back to let us know.

## 2025-02-28 NOTE — TELEPHONE ENCOUNTER
Labs all stable. Is it possible to change his toenail visit to march 7 at 320?  We can reopen my vv for 320, 340- I need to leave right around 4 that day.

## 2025-03-07 ENCOUNTER — OFFICE VISIT (OUTPATIENT)
Dept: INTERNAL MEDICINE CLINIC | Facility: CLINIC | Age: 80
End: 2025-03-07

## 2025-03-07 VITALS
OXYGEN SATURATION: 96 % | SYSTOLIC BLOOD PRESSURE: 176 MMHG | WEIGHT: 222.2 LBS | TEMPERATURE: 98.3 F | HEART RATE: 55 BPM | DIASTOLIC BLOOD PRESSURE: 79 MMHG | BODY MASS INDEX: 34.88 KG/M2 | HEIGHT: 67 IN

## 2025-03-07 DIAGNOSIS — I10 PRIMARY HYPERTENSION: ICD-10-CM

## 2025-03-07 DIAGNOSIS — M17.11 ARTHRITIS OF RIGHT KNEE: ICD-10-CM

## 2025-03-07 DIAGNOSIS — F43.21 GRIEF: ICD-10-CM

## 2025-03-07 DIAGNOSIS — G89.29 CHRONIC PAIN OF RIGHT KNEE: ICD-10-CM

## 2025-03-07 DIAGNOSIS — L60.0 INGROWN TOENAIL OF RIGHT FOOT: ICD-10-CM

## 2025-03-07 DIAGNOSIS — M25.561 CHRONIC PAIN OF RIGHT KNEE: ICD-10-CM

## 2025-03-07 DIAGNOSIS — F41.9 ANXIETY: Primary | ICD-10-CM

## 2025-03-07 RX ORDER — MIRTAZAPINE 15 MG/1
15 TABLET, FILM COATED ORAL NIGHTLY
Qty: 90 TABLET | Refills: 1 | Status: SHIPPED | OUTPATIENT
Start: 2025-03-07

## 2025-03-07 ASSESSMENT — PATIENT HEALTH QUESTIONNAIRE - PHQ9
SUM OF ALL RESPONSES TO PHQ QUESTIONS 1-9: 2
2. FEELING DOWN, DEPRESSED OR HOPELESS: SEVERAL DAYS
SUM OF ALL RESPONSES TO PHQ QUESTIONS 1-9: 2
1. LITTLE INTEREST OR PLEASURE IN DOING THINGS: SEVERAL DAYS

## 2025-03-07 ASSESSMENT — ENCOUNTER SYMPTOMS
ABDOMINAL PAIN: 0
COUGH: 0
NAUSEA: 1
DIARRHEA: 1
SHORTNESS OF BREATH: 0

## 2025-03-07 NOTE — PROGRESS NOTES
08/17/2015    Unspecified hearing loss 08/17/2015    hearing aids bilat    Unspecified hemorrhoids with other complication 8/17/2015    Unspecified hereditary and idiopathic peripheral neuropathy 08/17/2015    bilat LE d/t spinal arthritis    Urinary frequency 8/17/2015        Social History     Socioeconomic History    Marital status:      Spouse name: Not on file    Number of children: Not on file    Years of education: Not on file    Highest education level: Not on file   Occupational History    Not on file   Tobacco Use    Smoking status: Never    Smokeless tobacco: Never   Substance and Sexual Activity    Alcohol use: Yes     Alcohol/week: 6.0 standard drinks of alcohol    Drug use: No    Sexual activity: Not Currently     Partners: Female   Other Topics Concern    Not on file   Social History Narrative     and lives with wife.  Retired.  Previously served in the [employer].     Social Determinants of Health     Financial Resource Strain: Low Risk  (8/14/2024)    Overall Financial Resource Strain (CARDIA)     Difficulty of Paying Living Expenses: Not very hard   Food Insecurity: No Food Insecurity (2/14/2025)    Hunger Vital Sign     Worried About Running Out of Food in the Last Year: Never true     Ran Out of Food in the Last Year: Never true   Transportation Needs: No Transportation Needs (2/14/2025)    PRAPARE - Transportation     Lack of Transportation (Medical): No     Lack of Transportation (Non-Medical): No   Physical Activity: Insufficiently Active (2/14/2025)    Exercise Vital Sign     Days of Exercise per Week: 1 day     Minutes of Exercise per Session: 70 min   Stress: Not on file   Social Connections: Unknown (3/20/2021)    [facility]    Social Connections     Frequency of Communication with Friends and Family: Not asked     Frequency of Social Gatherings with Friends and Family: Not asked   Intimate Partner Violence: Unknown (3/20/2021)    Received from

## 2025-03-11 ENCOUNTER — TELEPHONE (OUTPATIENT)
Dept: INTERNAL MEDICINE CLINIC | Facility: CLINIC | Age: 80
End: 2025-03-11

## 2025-03-11 NOTE — TELEPHONE ENCOUNTER
Called to let pt know that the handicap placard prescription form is ready to be picked up.    Placed in file basket at the .

## 2025-03-17 ENCOUNTER — OFFICE VISIT (OUTPATIENT)
Dept: ORTHOPEDIC SURGERY | Age: 80
End: 2025-03-17
Payer: MEDICARE

## 2025-03-17 DIAGNOSIS — M54.50 CHRONIC RIGHT-SIDED LOW BACK PAIN, UNSPECIFIED WHETHER SCIATICA PRESENT: ICD-10-CM

## 2025-03-17 DIAGNOSIS — M25.561 RIGHT KNEE PAIN, UNSPECIFIED CHRONICITY: Primary | ICD-10-CM

## 2025-03-17 DIAGNOSIS — M17.11 PRIMARY OSTEOARTHRITIS OF RIGHT KNEE: ICD-10-CM

## 2025-03-17 DIAGNOSIS — G89.29 CHRONIC RIGHT-SIDED LOW BACK PAIN, UNSPECIFIED WHETHER SCIATICA PRESENT: ICD-10-CM

## 2025-03-17 PROCEDURE — 99205 OFFICE O/P NEW HI 60 MIN: CPT | Performed by: ORTHOPAEDIC SURGERY

## 2025-03-17 PROCEDURE — G8417 CALC BMI ABV UP PARAM F/U: HCPCS | Performed by: ORTHOPAEDIC SURGERY

## 2025-03-17 PROCEDURE — 1123F ACP DISCUSS/DSCN MKR DOCD: CPT | Performed by: ORTHOPAEDIC SURGERY

## 2025-03-17 PROCEDURE — G8428 CUR MEDS NOT DOCUMENT: HCPCS | Performed by: ORTHOPAEDIC SURGERY

## 2025-03-17 PROCEDURE — 1036F TOBACCO NON-USER: CPT | Performed by: ORTHOPAEDIC SURGERY

## 2025-03-17 NOTE — PROGRESS NOTES
Name: Stanislav Rainey  YOB: 1945  Gender: male  MRN: [id number]    CC: Right knee pain    HPI: Stanislav Rainey is a 79 y.o. male who presents with longstanding and progressive right knee pain.  He states over the past 6 or 8 months is gotten much worse.  He has trouble getting up and down and with stair climbing.  He feels insecure with it over uneven ground.  It bothers him when he walks for any length of time.  He has used diclofenac gel as well as oral Celebrex without a great deal of relief recently.  He has tried a sleeve on the knee without much benefit either.    He reports that he did have cortisone shots in the past which gave him only temporary relief.    He does have a history of lumbar spine surgery.  He denies numbness in the leg but states his back is bothering him more currently as well.    Per chart review his general health is fairly good but he unfortunately recently lost his wife and has been understandably grieving over this.      History was obtained from patient and his son accompanies him today.    ROS/Meds/PSH/PMH/FH/SH: I personally reviewed the patients standard intake form.  Below are the pertinents    Tobacco:  reports that he has never smoked. He has never used smokeless tobacco.  Past Medical History:   Diagnosis Date    Acute pancreatitis 8/17/2015    Allergic rhinitis 12/6/2015    Arthritis     OA    Backache, unspecified 8/17/2015    Benign neoplasm of colon 08/17/2015    hx    Benign neoplasm of other specified sites of skin 8/17/2015    Benign non-nodular prostatic hyperplasia with lower urinary tract symptoms 8/17/2015    Cervical arthritis 5/30/2017    Chronic sinusitis 8/17/2015    Depressive disorder, not elsewhere classified 8/17/2015    Deviated nasal septum 9/20/2016    Esophageal reflux 8/17/2015    Family history of sudden cardiac death     GERD (gastroesophageal reflux disease)     controlled w/med    History of COVID-19 03/2020    \"flu

## 2025-03-18 ENCOUNTER — TELEPHONE (OUTPATIENT)
Dept: ORTHOPEDIC SURGERY | Age: 80
End: 2025-03-18

## 2025-03-18 ENCOUNTER — OFFICE VISIT (OUTPATIENT)
Dept: INTERNAL MEDICINE CLINIC | Facility: CLINIC | Age: 80
End: 2025-03-18
Payer: MEDICARE

## 2025-03-18 VITALS
WEIGHT: 231.4 LBS | HEIGHT: 68 IN | OXYGEN SATURATION: 98 % | TEMPERATURE: 97.6 F | RESPIRATION RATE: 18 BRPM | BODY MASS INDEX: 35.07 KG/M2 | SYSTOLIC BLOOD PRESSURE: 151 MMHG | DIASTOLIC BLOOD PRESSURE: 72 MMHG | HEART RATE: 56 BPM

## 2025-03-18 DIAGNOSIS — F41.9 ANXIETY: Primary | ICD-10-CM

## 2025-03-18 DIAGNOSIS — F43.21 GRIEF: ICD-10-CM

## 2025-03-18 DIAGNOSIS — K21.9 GASTROESOPHAGEAL REFLUX DISEASE WITHOUT ESOPHAGITIS: ICD-10-CM

## 2025-03-18 DIAGNOSIS — M19.90 ARTHRITIS: ICD-10-CM

## 2025-03-18 DIAGNOSIS — G62.9 NEUROPATHY: ICD-10-CM

## 2025-03-18 PROCEDURE — 1159F MED LIST DOCD IN RCRD: CPT | Performed by: NURSE PRACTITIONER

## 2025-03-18 PROCEDURE — G8417 CALC BMI ABV UP PARAM F/U: HCPCS | Performed by: NURSE PRACTITIONER

## 2025-03-18 PROCEDURE — 1123F ACP DISCUSS/DSCN MKR DOCD: CPT | Performed by: NURSE PRACTITIONER

## 2025-03-18 PROCEDURE — 99214 OFFICE O/P EST MOD 30 MIN: CPT | Performed by: NURSE PRACTITIONER

## 2025-03-18 PROCEDURE — 1036F TOBACCO NON-USER: CPT | Performed by: NURSE PRACTITIONER

## 2025-03-18 PROCEDURE — 1160F RVW MEDS BY RX/DR IN RCRD: CPT | Performed by: NURSE PRACTITIONER

## 2025-03-18 PROCEDURE — G8427 DOCREV CUR MEDS BY ELIG CLIN: HCPCS | Performed by: NURSE PRACTITIONER

## 2025-03-18 RX ORDER — CELECOXIB 100 MG/1
100 CAPSULE ORAL DAILY PRN
Qty: 90 CAPSULE | Refills: 0 | Status: SHIPPED | OUTPATIENT
Start: 2025-03-18 | End: 2025-06-16

## 2025-03-18 RX ORDER — PANTOPRAZOLE SODIUM 40 MG/1
40 TABLET, DELAYED RELEASE ORAL DAILY
Qty: 90 TABLET | Refills: 1 | Status: SHIPPED | OUTPATIENT
Start: 2025-03-18 | End: 2025-09-14

## 2025-03-18 RX ORDER — GABAPENTIN 100 MG/1
100 CAPSULE ORAL NIGHTLY
Qty: 90 CAPSULE | Refills: 1 | Status: SHIPPED | OUTPATIENT
Start: 2025-03-18 | End: 2025-09-14

## 2025-03-18 ASSESSMENT — ENCOUNTER SYMPTOMS
SHORTNESS OF BREATH: 0
COUGH: 0
ABDOMINAL PAIN: 0

## 2025-03-18 NOTE — PROGRESS NOTES
Smokeless tobacco: Never   Substance and Sexual Activity    Alcohol use: Yes     Alcohol/week: 6.0 standard drinks of alcohol    Drug use: No    Sexual activity: Not Currently     Partners: Female   Other Topics Concern    Not on file   Social History Narrative     and lives with wife.  Retired.  Previously served in the [employer].     Social Drivers of Health     Financial Resource Strain: Low Risk  (8/14/2024)    Overall Financial Resource Strain (CARDIA)     Difficulty of Paying Living Expenses: Not very hard   Food Insecurity: No Food Insecurity (2/14/2025)    Hunger Vital Sign     Worried About Running Out of Food in the Last Year: Never true     Ran Out of Food in the Last Year: Never true   Transportation Needs: No Transportation Needs (2/14/2025)    PRAPARE - Transportation     Lack of Transportation (Medical): No     Lack of Transportation (Non-Medical): No   Physical Activity: Insufficiently Active (2/14/2025)    Exercise Vital Sign     Days of Exercise per Week: 1 day     Minutes of Exercise per Session: 70 min   Stress: Not on file   Social Connections: Unknown (3/20/2021)    Received from [facility]    Social Connections     Frequency of Communication with Friends and Family: Not asked     Frequency of Social Gatherings with Friends and Family: Not asked   Intimate Partner Violence: Unknown (3/20/2021)    Received from [facility]    Intimate Partner Violence     Fear of Current or Ex-Partner: Not asked     Emotionally Abused: Not asked     Physically Abused: Not asked     Sexually Abused: Not asked   Housing Stability: Low Risk  (2/14/2025)    Housing Stability Vital Sign     Unable to Pay for Housing in the Last Year: No     Number of Times Moved in the Last Year: 0     Homeless in the Last Year: No            Review of Systems    Review of Systems   Constitutional:  Negative for chills, fatigue, fever and unexpected weight change.   Respiratory:  Negative for

## 2025-03-19 ENCOUNTER — TELEPHONE (OUTPATIENT)
Dept: ORTHOPEDIC SURGERY | Age: 80
End: 2025-03-19

## 2025-03-19 NOTE — TELEPHONE ENCOUNTER
Call John Magaña   [phone]  - he is not on the communication  form  it has not been updated  since  2022  or  call John Hebert he is on the  form        they would like to set  up surgery  for  July 22 or  right  there after    They already  know that he  will need to go to rehab

## 2025-03-20 ENCOUNTER — TELEPHONE (OUTPATIENT)
Dept: ORTHOPEDIC SURGERY | Age: 80
End: 2025-03-20

## 2025-03-24 ENCOUNTER — TELEPHONE (OUTPATIENT)
Dept: ORTHOPEDIC SURGERY | Age: 80
End: 2025-03-24

## 2025-03-24 NOTE — TELEPHONE ENCOUNTER
He is having surgery July 11. They are wanting to know if he can go to [facility] rehab for a couple of weeks after because he lives alone.

## 2025-03-24 NOTE — TELEPHONE ENCOUNTER
Merced Becerra is his caregiver and she is calling to see when his surgery will be in July. Her number is [phone]

## 2025-04-15 DIAGNOSIS — M19.90 ARTHRITIS: ICD-10-CM

## 2025-04-15 DIAGNOSIS — K21.9 GASTROESOPHAGEAL REFLUX DISEASE WITHOUT ESOPHAGITIS: ICD-10-CM

## 2025-04-15 RX ORDER — CELECOXIB 100 MG/1
100 CAPSULE ORAL DAILY PRN
Qty: 90 CAPSULE | Refills: 1 | Status: SHIPPED | OUTPATIENT
Start: 2025-04-15

## 2025-04-15 RX ORDER — PANTOPRAZOLE SODIUM 40 MG/1
40 TABLET, DELAYED RELEASE ORAL DAILY
Qty: 90 TABLET | Refills: 1 | Status: SHIPPED | OUTPATIENT
Start: 2025-04-15

## 2025-04-29 ENCOUNTER — HOSPITAL ENCOUNTER (OUTPATIENT)
Dept: GENERAL RADIOLOGY | Age: 80
Discharge: HOME | End: 2025-05-01
Payer: MEDICARE

## 2025-04-29 DIAGNOSIS — M25.551 BILATERAL HIP PAIN: ICD-10-CM

## 2025-04-29 DIAGNOSIS — M54.16 LUMBAR RADICULOPATHY: ICD-10-CM

## 2025-04-29 DIAGNOSIS — M25.552 BILATERAL HIP PAIN: ICD-10-CM

## 2025-04-29 PROCEDURE — 73521 X-RAY EXAM HIPS BI 2 VIEWS: CPT

## 2025-05-01 ENCOUNTER — TRANSCRIBE ORDERS (OUTPATIENT)
Dept: SCHEDULING | Age: 80
End: 2025-05-01

## 2025-05-01 DIAGNOSIS — M17.11 PRIMARY OSTEOARTHRITIS OF RIGHT KNEE: Primary | ICD-10-CM

## 2025-05-01 DIAGNOSIS — M54.16 LUMBAR RADICULOPATHY: Primary | ICD-10-CM

## 2025-05-16 ENCOUNTER — OFFICE VISIT (OUTPATIENT)
Dept: INTERNAL MEDICINE CLINIC | Facility: CLINIC | Age: 80
End: 2025-05-16

## 2025-05-16 VITALS
RESPIRATION RATE: 16 BRPM | HEIGHT: 68 IN | WEIGHT: 209.8 LBS | BODY MASS INDEX: 31.8 KG/M2 | TEMPERATURE: 97.1 F | DIASTOLIC BLOOD PRESSURE: 69 MMHG | HEART RATE: 52 BPM | SYSTOLIC BLOOD PRESSURE: 145 MMHG | OXYGEN SATURATION: 95 %

## 2025-05-16 DIAGNOSIS — E78.2 MIXED HYPERLIPIDEMIA: ICD-10-CM

## 2025-05-16 DIAGNOSIS — F41.9 ANXIETY: Primary | ICD-10-CM

## 2025-05-16 DIAGNOSIS — F33.2 SEVERE EPISODE OF RECURRENT MAJOR DEPRESSIVE DISORDER, WITHOUT PSYCHOTIC FEATURES (HCC): ICD-10-CM

## 2025-05-16 DIAGNOSIS — R53.82 CHRONIC FATIGUE: ICD-10-CM

## 2025-05-16 DIAGNOSIS — I65.23 BILATERAL CAROTID ARTERY STENOSIS: ICD-10-CM

## 2025-05-16 DIAGNOSIS — K51.90 ULCERATIVE COLITIS WITHOUT COMPLICATIONS, UNSPECIFIED LOCATION (HCC): ICD-10-CM

## 2025-05-16 DIAGNOSIS — K59.1 FUNCTIONAL DIARRHEA: ICD-10-CM

## 2025-05-16 DIAGNOSIS — H90.5 SENSORINEURAL HEARING LOSS (SNHL), UNSPECIFIED LATERALITY: ICD-10-CM

## 2025-05-16 PROBLEM — F32.2 CURRENT SEVERE EPISODE OF MAJOR DEPRESSIVE DISORDER WITHOUT PSYCHOTIC FEATURES (HCC): Status: ACTIVE | Noted: 2025-05-16

## 2025-05-16 LAB
ANION GAP SERPL CALC-SCNC: 9 MMOL/L (ref 7–16)
BASOPHILS # BLD: 0.06 K/UL (ref 0–0.2)
BASOPHILS NFR BLD: 0.7 % (ref 0–2)
BUN SERPL-MCNC: 26 MG/DL (ref 8–23)
CALCIUM SERPL-MCNC: 9.1 MG/DL (ref 8.8–10.2)
CHLORIDE SERPL-SCNC: 102 MMOL/L (ref 98–107)
CO2 SERPL-SCNC: 25 MMOL/L (ref 20–29)
CREAT SERPL-MCNC: 1.15 MG/DL (ref 0.8–1.3)
DIFFERENTIAL METHOD BLD: ABNORMAL
EOSINOPHIL # BLD: 0.17 K/UL (ref 0–0.8)
EOSINOPHIL NFR BLD: 2 % (ref 0.5–7.8)
ERYTHROCYTE [DISTWIDTH] IN BLOOD BY AUTOMATED COUNT: 12.5 % (ref 11.9–14.6)
GLUCOSE SERPL-MCNC: 123 MG/DL (ref 70–99)
HCT VFR BLD AUTO: 47.6 % (ref 41.1–50.3)
HGB BLD-MCNC: 16.6 G/DL (ref 13.6–17.2)
IMM GRANULOCYTES # BLD AUTO: 0.02 K/UL (ref 0–0.5)
IMM GRANULOCYTES NFR BLD AUTO: 0.2 % (ref 0–5)
LYMPHOCYTES # BLD: 1.58 K/UL (ref 0.5–4.6)
LYMPHOCYTES NFR BLD: 18.6 % (ref 13–44)
MCH RBC QN AUTO: 35.9 PG (ref 26.1–32.9)
MCHC RBC AUTO-ENTMCNC: 34.9 G/DL (ref 31.4–35)
MCV RBC AUTO: 102.8 FL (ref 82–102)
MONOCYTES # BLD: 0.68 K/UL (ref 0.1–1.3)
MONOCYTES NFR BLD: 8 % (ref 4–12)
NEUTS SEG # BLD: 6 K/UL (ref 1.7–8.2)
NEUTS SEG NFR BLD: 70.5 % (ref 43–78)
NRBC # BLD: 0 K/UL (ref 0–0.2)
PLATELET # BLD AUTO: 212 K/UL (ref 150–450)
PMV BLD AUTO: 10.7 FL (ref 9.4–12.3)
POTASSIUM SERPL-SCNC: 4.9 MMOL/L (ref 3.5–5.1)
RBC # BLD AUTO: 4.63 M/UL (ref 4.23–5.6)
SODIUM SERPL-SCNC: 137 MMOL/L (ref 136–145)
WBC # BLD AUTO: 8.5 K/UL (ref 4.3–11.1)

## 2025-05-16 RX ORDER — DIPHENOXYLATE HYDROCHLORIDE AND ATROPINE SULFATE 2.5; .025 MG/1; MG/1
1-2 TABLET ORAL 4 TIMES DAILY PRN
COMMUNITY
Start: 2025-05-14 | End: 2025-05-16 | Stop reason: SDUPTHER

## 2025-05-16 RX ORDER — OLSALAZINE SODIUM 250 MG/1
CAPSULE, GELATIN COATED ORAL
Qty: 180 CAPSULE | Refills: 3 | Status: CANCELLED | OUTPATIENT
Start: 2025-05-16

## 2025-05-16 RX ORDER — CITALOPRAM HYDROBROMIDE 10 MG/1
10 TABLET ORAL DAILY
Qty: 90 TABLET | Refills: 0 | Status: SHIPPED | OUTPATIENT
Start: 2025-05-16

## 2025-05-16 RX ORDER — ASPIRIN 81 MG/1
81 TABLET ORAL DAILY
Qty: 30 TABLET | COMMUNITY
Start: 2025-05-16 | End: 2026-05-16

## 2025-05-16 RX ORDER — ASPIRIN 81 MG/1
81 TABLET, CHEWABLE ORAL DAILY
Qty: 100 TABLET | Refills: 3
Start: 2025-05-16

## 2025-05-16 RX ORDER — DIPHENOXYLATE HYDROCHLORIDE AND ATROPINE SULFATE 2.5; .025 MG/1; MG/1
1-2 TABLET ORAL 4 TIMES DAILY PRN
Qty: 30 TABLET | Refills: 3 | Status: SHIPPED | OUTPATIENT
Start: 2025-05-16 | End: 2025-06-15

## 2025-05-16 RX ORDER — CELECOXIB 200 MG/1
200 CAPSULE ORAL DAILY
COMMUNITY
Start: 2025-03-28

## 2025-05-16 RX ORDER — LANOLIN ALCOHOL/MO/W.PET/CERES
1000 CREAM (GRAM) TOPICAL DAILY
COMMUNITY

## 2025-05-16 RX ORDER — ATORVASTATIN CALCIUM 20 MG/1
20 TABLET, FILM COATED ORAL DAILY
Qty: 90 TABLET | Refills: 3
Start: 2025-05-16

## 2025-05-16 NOTE — PROGRESS NOTES
5/16/2025 12:56 PM  Location:[facility] PHYSICIAN SERVICES  [facility] INTERNAL MEDICINE  [place]  Patient #:  [id number]  YOB: 1945      History of Present Illness  The patient presents for evaluation of transient ischemic attack (TIA), diarrhea, and anxiety.    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 Seeking a second opinion on cardiac health and blood pressure management. On atorvastatin 20 mg daily since 2023 without adverse effects. Discontinued baby aspirin but plans to resume. Scheduled for knee surgery in July 2025.  - Onset: History of 3 TIAs.  - Duration: No residual damage.  - Alleviating/Aggravating Factors: On atorvastatin 20 mg daily since 2023 without adverse effects; discontinued baby aspirin but plans to resume.    Diarrhea and Abdominal Pressure  Experienced abdominal pressure and pain attributed to stress and consumption of chocolate-coated straw snacks containing nuts, which she does not tolerate. Continues to experience abdominal pressure and had chest pressure while lying in bed last night. Increasing spasms in hands and feet for several weeks. Prescribed medication for diarrhea; first bowel movement before this visit was soft and formed. Experiencing watery diarrhea for several weeks, no firm stool. Has not tried probiotics. Reports significant discomfort and stress, believes it exacerbates colitis. Dehydrated during ER visit. Noticed sweet smell in urine, suspects medication side effect. Efforts to stay hydrated include drinking a full glass of water with each meal, while watching TV, and keeping a glass by her bed at night. Reports frequent urination. History of ulcerative colitis, initially thought to be stress-induced or viral. On dipentum 2 tablets in the morning for colon health since its introduction.  He underwent colonoscopy earlier this year which showed no evidence of inflammation, colon polyp was noted which was benign.

## 2025-05-18 ENCOUNTER — RESULTS FOLLOW-UP (OUTPATIENT)
Dept: INTERNAL MEDICINE CLINIC | Facility: CLINIC | Age: 80
End: 2025-05-18

## 2025-05-20 DIAGNOSIS — K59.1 FUNCTIONAL DIARRHEA: ICD-10-CM

## 2025-05-20 RX ORDER — DIPHENOXYLATE HYDROCHLORIDE AND ATROPINE SULFATE 2.5; .025 MG/1; MG/1
1-2 TABLET ORAL 4 TIMES DAILY PRN
Qty: 30 TABLET | Refills: 3 | Status: SHIPPED | OUTPATIENT
Start: 2025-05-20 | End: 2025-06-19

## 2025-05-20 NOTE — TELEPHONE ENCOUNTER
Pt is requesting a refill on the Lomotil  It is helping him get relief, he have about 1 day left and would like a refill    Rx pending below

## 2025-05-21 NOTE — TELEPHONE ENCOUNTER
Rx pending below to be sent to pt's pharmacy.      Next appt with provider   Next appt is with Roseline Guaman  8/13/2025

## 2025-05-22 ENCOUNTER — TELEPHONE (OUTPATIENT)
Dept: INTERNAL MEDICINE CLINIC | Facility: CLINIC | Age: 80
End: 2025-05-22

## 2025-05-22 RX ORDER — OLSALAZINE SODIUM 250 MG/1
500 CAPSULE, GELATIN COATED ORAL DAILY
Qty: 180 CAPSULE | Refills: 3 | Status: SHIPPED | OUTPATIENT
Start: 2025-05-22

## 2025-05-22 NOTE — TELEPHONE ENCOUNTER
Merced Becerra (caregiver), has called and has stated that Mr. Rainey has hit rock bottom with his emotions. He has told her that he was in a dark hole and can't get out.  She has stated that he is needing help and that she is going to take him to [facility] today to see if they can help him.  She wanted to let Dr. Hartmann know about him..    Merced,  [phone]

## 2025-05-28 ENCOUNTER — TELEPHONE (OUTPATIENT)
Dept: INTERNAL MEDICINE CLINIC | Facility: CLINIC | Age: 80
End: 2025-05-28

## 2025-05-28 NOTE — TELEPHONE ENCOUNTER
D/C DATE: 5/30/2025    D/C FROM: [facility]    D/C TO: Home    PHONE NUMBER TO REACH PATIENT: [phone]    F/U APPT DATE & TIME: 6/3/2025 @ 8:40 Amy

## 2025-05-30 ENCOUNTER — TELEPHONE (OUTPATIENT)
Dept: INTERNAL MEDICINE CLINIC | Facility: CLINIC | Age: 80
End: 2025-05-30

## 2025-05-30 DIAGNOSIS — G62.9 NEUROPATHY: ICD-10-CM

## 2025-05-30 DIAGNOSIS — E78.2 MIXED HYPERLIPIDEMIA: ICD-10-CM

## 2025-05-30 DIAGNOSIS — F41.9 ANXIETY: ICD-10-CM

## 2025-05-30 RX ORDER — CITALOPRAM HYDROBROMIDE 10 MG/1
10 TABLET ORAL DAILY
Qty: 90 TABLET | Refills: 3 | Status: SHIPPED | OUTPATIENT
Start: 2025-05-30

## 2025-05-30 RX ORDER — ATORVASTATIN CALCIUM 20 MG/1
20 TABLET, FILM COATED ORAL DAILY
Qty: 90 TABLET | Refills: 3 | Status: SHIPPED | OUTPATIENT
Start: 2025-05-30

## 2025-05-30 RX ORDER — GABAPENTIN 100 MG/1
100 CAPSULE ORAL NIGHTLY
Qty: 90 CAPSULE | Refills: 1 | Status: SHIPPED | OUTPATIENT
Start: 2025-05-30 | End: 2025-11-26

## 2025-05-30 RX ORDER — CELECOXIB 200 MG/1
200 CAPSULE ORAL DAILY
Qty: 90 CAPSULE | Refills: 3 | Status: SHIPPED | OUTPATIENT
Start: 2025-05-30

## 2025-05-30 NOTE — TELEPHONE ENCOUNTER
Care Transitions Initial Follow Up Call    Outreach made within 2 business days of discharge: Yes  Date of TCM Call: 2025      Patient: Stanislav Rainey Patient : 1945   MRN: [id number]  Reason for Admission: Depression Discharge Date: 2025        Spoke with: Merced, patients caregiver    Discharge department/facility: [facility]    TCM Interactive Patient Contact:  Was patient able to fill all prescriptions: Yes  Was patient instructed to bring all medications to the follow-up visit: Yes  Is patient taking all medications as directed in the discharge summary? Yes  Does patient understand their discharge instructions: Yes  Does patient have questions or concerns that need addressed prior to 7-14 day follow up office visit: no    Scheduled appointment with PCP within 7-14 days    Follow Up  Future Appointments   Date Time Provider Department Center   2025  6:30 PM SFD MRI UNIT 1 SFDRMRI SFD   6/3/2025  8:40 AM Edelmira Samson APRN - NP [facility]IM [facility] ECC DEP   2025 10:30 AM SFE ASSESSMENT RM 04 SFEORPA SFE   2025 12:00 PM JOINT CAMP THERAPIST 1 SFERHSV SFE   2025  8:00 AM Roseline Guaman APRN - SHAMA [facility] ECC DEP   8/15/2025  9:45 AM Catracho Treadwell MD POAI GVL AMB   2026  1:25 PM Catracho Treadwell MD POAG GVL AMB       YUMIKO GARCIA MA

## 2025-05-30 NOTE — TELEPHONE ENCOUNTER
Per Merced Becerra, pt's caregiver,  has admitted himself into [facility] through  [facility]al [facility] in [place]. He has been there since last Friday. He has been diagnosed with severe depression. Typically he would be there 7-10 days but the doctor is uncertain how long  will be there. While he is there, they need all his medications sent through [facility] instead of [facility].      Call Back# [phone]

## 2025-06-03 ENCOUNTER — OFFICE VISIT (OUTPATIENT)
Dept: INTERNAL MEDICINE CLINIC | Facility: CLINIC | Age: 80
End: 2025-06-03

## 2025-06-03 VITALS
OXYGEN SATURATION: 96 % | HEIGHT: 68 IN | SYSTOLIC BLOOD PRESSURE: 149 MMHG | TEMPERATURE: 97.2 F | BODY MASS INDEX: 31.46 KG/M2 | WEIGHT: 207.6 LBS | DIASTOLIC BLOOD PRESSURE: 78 MMHG | HEART RATE: 55 BPM | RESPIRATION RATE: 18 BRPM

## 2025-06-03 DIAGNOSIS — Z09 HOSPITAL DISCHARGE FOLLOW-UP: Primary | ICD-10-CM

## 2025-06-03 DIAGNOSIS — R73.09 ELEVATED GLUCOSE: ICD-10-CM

## 2025-06-03 DIAGNOSIS — K51.90 ULCERATIVE COLITIS WITHOUT COMPLICATIONS, UNSPECIFIED LOCATION (HCC): ICD-10-CM

## 2025-06-03 DIAGNOSIS — F33.2 SEVERE EPISODE OF RECURRENT MAJOR DEPRESSIVE DISORDER, WITHOUT PSYCHOTIC FEATURES (HCC): ICD-10-CM

## 2025-06-03 DIAGNOSIS — F41.9 ANXIETY: ICD-10-CM

## 2025-06-03 LAB
EST. AVERAGE GLUCOSE BLD GHB EST-MCNC: 126 MG/DL
HBA1C MFR BLD: 6 % (ref 0–5.6)

## 2025-06-03 RX ORDER — PREDNISONE 10 MG/1
10 TABLET ORAL 2 TIMES DAILY
Qty: 10 TABLET | Refills: 0 | Status: SHIPPED | OUTPATIENT
Start: 2025-06-03 | End: 2025-06-08

## 2025-06-03 RX ORDER — LORAZEPAM 1 MG/1
TABLET ORAL
COMMUNITY
Start: 2025-05-08 | End: 2025-06-03 | Stop reason: ALTCHOICE

## 2025-06-03 RX ORDER — THIAMINE MONONITRATE (VIT B1) 100 MG
100 TABLET ORAL DAILY
COMMUNITY

## 2025-06-03 RX ORDER — LANOLIN ALCOHOL/MO/W.PET/CERES
1000 CREAM (GRAM) TOPICAL DAILY
COMMUNITY

## 2025-06-03 RX ORDER — DUPILUMAB 100 MG/.67ML
300 INJECTION, SOLUTION SUBCUTANEOUS
COMMUNITY

## 2025-06-03 RX ORDER — SUCRALFATE ORAL 1 G/10ML
1 SUSPENSION ORAL 4 TIMES DAILY
COMMUNITY

## 2025-06-03 RX ORDER — ESCITALOPRAM OXALATE 10 MG/1
10 TABLET ORAL DAILY
COMMUNITY
Start: 2025-06-02

## 2025-06-03 ASSESSMENT — ENCOUNTER SYMPTOMS
COUGH: 0
SHORTNESS OF BREATH: 0
BLOOD IN STOOL: 0
ABDOMINAL PAIN: 0
RECTAL PAIN: 1
DIARRHEA: 0
CONSTIPATION: 0

## 2025-06-03 NOTE — PROGRESS NOTES
[facility] Internal Medicine  [address] [address]     Office Visit    Stanislav Rainey   1945 06/03/25       Subjective:     Chief Complaint   Patient presents with    Follow-Up from Hospital     Pt presents to the office today for a transitions of care from the [facility] where he was admitted for Depression.  Patient initially went to the ER on 5/22/25 for depression and suicidal thoughts, and was recommended to [facility], he was d/c from the ER and the next day self admitted himself to [facility].    Admission date: 05/22/2025  Discharge date: 05/30/2025     48 business hour phone call documented and reviewed in chart on date: 05/30/2025          History of Present Illness  The patient is an 79-year-old male who presents for a transitional care management (TCM) visit. He was admitted to the [facility] for depression and suicidal ideations. He self-admitted there after being discharged from the ER on 05/22/2025 and was recommended to seek care at [facility].    He reports that his symptoms of anxiety exacerbated prior to his admission to the [facility]. He underwent blood tests approximately 1.5 weeks before his admission, which did not indicate any abnormalities. He has no family history of diabetes but his A1c while hospitalized was reported as 6.1, he has been prediabetic, in the past in February it was 6.2 he is not currently on medication for diabetes. He was discharged from the [facility] last night, where he participated in group meetings and counseling sessions. He reports no current suicidal ideations or plans. He had been sleeping with a pistol but has taken precautions to ensure it is securely locked away. He feels better now compared to when he was admitted to the ER. He reports no racing heart but admits to excessive crying prior to his admission. He believes he was suppressing his grief, which led to a

## 2025-06-04 ENCOUNTER — RESULTS FOLLOW-UP (OUTPATIENT)
Dept: INTERNAL MEDICINE CLINIC | Facility: CLINIC | Age: 80
End: 2025-06-04

## 2025-06-11 ENCOUNTER — HOSPITAL ENCOUNTER (OUTPATIENT)
Dept: MRI IMAGING | Age: 80
Discharge: HOME | End: 2025-06-13
Payer: MEDICARE

## 2025-06-11 DIAGNOSIS — M54.16 LUMBAR RADICULOPATHY: ICD-10-CM

## 2025-06-11 PROCEDURE — 72148 MRI LUMBAR SPINE W/O DYE: CPT

## 2025-06-16 ENCOUNTER — TELEPHONE (OUTPATIENT)
Dept: ORTHOPEDIC SURGERY | Age: 80
End: 2025-06-16

## 2025-06-16 ENCOUNTER — CARE COORDINATION (OUTPATIENT)
Dept: CARE COORDINATION | Facility: CLINIC | Age: 80
End: 2025-06-16

## 2025-06-16 NOTE — CARE COORDINATION
Ambulatory Care Coordination Note     6/16/2025 2:57 PM        Ambulatory Care Manager:  Andreia Le RN, BSN, [facility] [phone]       Care Summary Note: Patient outreach attempt by this AC today to offer care management services. ACM was unable to reach the patient by telephone today;   left voice message requesting a return phone call to this ACM.  Patient had ED visit on 6/15/2025 at [facility] for Acute cystitis with hematuria (Primary Dx);  Colitis  Discharge Disposition: Home or Self Care   Patient has had 3 ED visits over the past 6 months   Patient has f/u with PCP on 6/19/2025  Will attempt one more outreach before closing the case     PCP/Specialist follow up:   Future Appointments         Provider Specialty Dept Phone    6/17/2025 10:30 AM SFE ASSESSMENT RM 04 Pre-Admission Testing [phone]    6/17/2025 12:00 PM JOINT CAMP THERAPIST 1 Rehabilitation [phone]    6/19/2025 11:40 AM Cristal Levine APRN - CNP Internal Medicine [phone]    6/24/2025 11:00 AM Edelmira Samson APRN - NP Internal Medicine [phone]    8/13/2025 8:00 AM Roseline Guaman APRN - SHAMA Internal Medicine [phone]    8/15/2025 9:45 AM (Arrive by 9:30 AM) Catracho Treadwell MD Orthopedic Surgery [phone]    1/26/2026 1:25 PM (Arrive by 1:10 PM) Catracho Treadwell MD Orthopedic Surgery [phone]            Follow Up:   Plan for next AC outreach in approximately 1-2 days  to complete:  - outreach attempt to offer care management services.

## 2025-06-16 NOTE — TELEPHONE ENCOUNTER
He is having surgery on July 11. He needs to RS his pre-op class. He will be out of town June 28 - July 6. Please give them a call.

## 2025-06-17 ENCOUNTER — HOSPITAL ENCOUNTER (OUTPATIENT)
Dept: REHABILITATION | Age: 80
Discharge: HOME | End: 2025-06-20
Payer: MEDICARE

## 2025-06-17 ENCOUNTER — HOSPITAL ENCOUNTER (OUTPATIENT)
Dept: SURGERY | Age: 80
Discharge: HOME | End: 2025-06-20
Payer: MEDICARE

## 2025-06-17 ENCOUNTER — TELEPHONE (OUTPATIENT)
Dept: SURGERY | Age: 80
End: 2025-06-17

## 2025-06-17 VITALS
DIASTOLIC BLOOD PRESSURE: 75 MMHG | HEART RATE: 71 BPM | HEIGHT: 70 IN | SYSTOLIC BLOOD PRESSURE: 161 MMHG | RESPIRATION RATE: 16 BRPM | OXYGEN SATURATION: 96 % | TEMPERATURE: 98.6 F | WEIGHT: 203.93 LBS | BODY MASS INDEX: 29.19 KG/M2

## 2025-06-17 LAB
ALBUMIN SERPL-MCNC: 3.2 G/DL (ref 3.2–4.6)
ALBUMIN/GLOB SERPL: 0.9 (ref 1–1.9)
ALP SERPL-CCNC: 82 U/L (ref 40–129)
ALT SERPL-CCNC: 56 U/L (ref 8–55)
ANION GAP SERPL CALC-SCNC: 11 MMOL/L (ref 7–16)
AST SERPL-CCNC: 26 U/L (ref 15–37)
BILIRUB SERPL-MCNC: 0.7 MG/DL (ref 0–1.2)
BUN SERPL-MCNC: 27 MG/DL (ref 8–23)
CALCIUM SERPL-MCNC: 9.3 MG/DL (ref 8.8–10.2)
CHLORIDE SERPL-SCNC: 104 MMOL/L (ref 98–107)
CO2 SERPL-SCNC: 23 MMOL/L (ref 20–29)
CREAT SERPL-MCNC: 0.99 MG/DL (ref 0.8–1.3)
ERYTHROCYTE [DISTWIDTH] IN BLOOD BY AUTOMATED COUNT: 12.6 % (ref 11.9–14.6)
GLOBULIN SER CALC-MCNC: 3.5 G/DL (ref 2.3–3.5)
GLUCOSE SERPL-MCNC: 137 MG/DL (ref 70–99)
HCT VFR BLD AUTO: 43.1 % (ref 41.1–50.3)
HGB BLD-MCNC: 14.8 G/DL (ref 13.6–17.2)
MCH RBC QN AUTO: 34.7 PG (ref 26.1–32.9)
MCHC RBC AUTO-ENTMCNC: 34.3 G/DL (ref 31.4–35)
MCV RBC AUTO: 101.2 FL (ref 82–102)
MRSA DNA SPEC QL NAA+PROBE: NOT DETECTED
NRBC # BLD: 0 K/UL (ref 0–0.2)
PLATELET # BLD AUTO: 218 K/UL (ref 150–450)
PMV BLD AUTO: 10 FL (ref 9.4–12.3)
POTASSIUM SERPL-SCNC: 4.7 MMOL/L (ref 3.5–5.1)
PROT SERPL-MCNC: 6.7 G/DL (ref 6.3–8.2)
RBC # BLD AUTO: 4.26 M/UL (ref 4.23–5.6)
S AUREUS CPE NOSE QL NAA+PROBE: DETECTED
SODIUM SERPL-SCNC: 138 MMOL/L (ref 136–145)
WBC # BLD AUTO: 14.7 K/UL (ref 4.3–11.1)

## 2025-06-17 PROCEDURE — 80053 COMPREHEN METABOLIC PANEL: CPT

## 2025-06-17 PROCEDURE — 87641 MR-STAPH DNA AMP PROBE: CPT

## 2025-06-17 PROCEDURE — 85027 COMPLETE CBC AUTOMATED: CPT

## 2025-06-17 PROCEDURE — 98960 EDU&TRN PT SELF-MGMT NQHP 1: CPT

## 2025-06-17 PROCEDURE — 97161 PT EVAL LOW COMPLEX 20 MIN: CPT

## 2025-06-17 PROCEDURE — 94760 N-INVAS EAR/PLS OXIMETRY 1: CPT

## 2025-06-17 RX ORDER — CLOPIDOGREL BISULFATE 75 MG/1
75 TABLET ORAL NIGHTLY
COMMUNITY

## 2025-06-17 RX ORDER — CITALOPRAM HYDROBROMIDE 10 MG/1
10 TABLET ORAL NIGHTLY
COMMUNITY

## 2025-06-17 ASSESSMENT — KOOS JR
BENDING TO THE FLOOR TO PICK UP OBJECT: MILD
KOOS JR TOTAL INTERVAL SCORE: 70.704
RISING FROM SITTING: MODERATE
GOING UP OR DOWN STAIRS: MILD
TWISING OR PIVOTING ON KNEE: MODERATE

## 2025-06-17 ASSESSMENT — PULMONARY FUNCTION TESTS
FEV1 (LITERS): 2.42
FEV1 (%PREDICTED): 93

## 2025-06-17 ASSESSMENT — PAIN SCALES - GENERAL: PAINLEVEL_OUTOF10: 1

## 2025-06-17 NOTE — PERIOP NOTE
MEDICATION CLEARANCE    Surgical, Procedural, or Medication Clearance    Physician or Practice Requesting Clearance: [facility]  : Nancy Briggs RN  Contact Phone Number: [phone]  Contact Fax Number: [phone]  Date of Surgery/Procedure: 7/11/25  Type of Surgery or Procedure: Right TKA  Type of Anesthesia: SPINAL  Medication to hold: Plavix  Requested # of days to hold: 7 days    Patient will remain on daily 81 mg ASA.

## 2025-06-17 NOTE — PERIOP NOTE
PLEASE CONTINUE TAKING ALL PRESCRIPTION MEDICATIONS UP TO THE DAY OF SURGERY UNLESS OTHERWISE DIRECTED BELOW.     DISCONTINUE all over the counter vitamins, supplements, and herbals 21 days prior to surgery. DISCONTINUE Non-Steroidal Anti-Inflammatory (NSAIDS) such as Advil, Ibuprofen, Motrin, Naproxen, and Aleve 5 days prior to surgery.      *IT IS OK TO TAKE TYLENOL, Allergy medication, and indigestion medications*     Prescription medications to HOLD     Hold Plavix 7 days prior to surgery.           CONTINUE all other prescriptions like normal up until the day of surgery--TAKE ONLY THE BELOW MEDICATIONS THE DAY OF SURGERY.    81 mg Aspirin          Pantoprazole   Olsalazine               Atorvastatin    Celebrex                  Lexapro       Comments   The day before surgery please take 2 Tylenol in the morning and then again before bed. You may use either regular or extra strength.        Bring Dynahex soap and incentive spirometer back to the hospital.          Please do not bring home medications with you on the day of surgery unless otherwise directed by your nurse.  If you are instructed to bring home medications, please give them to your nurse as they will be administered by the nursing staff.     If you have any questions, please call [facility] [phone].     A copy of this note was provided to the patient for reference.

## 2025-06-17 NOTE — PROGRESS NOTES
Stanislav Rainey  : 1945  Primary: Medicare Part A And B  Secondary:  [employer] MEDICARE SUPP [facility] [place] [address]  [phone]      Physical Therapy Prehab Evaluation Summary:2025   Time In/Out   PT Charge Capture  Episode     MEDICAL/REFERRING DIAGNOSIS: Unilateral primary osteoarthritis, right knee [M17.11]  REFERRING PHYSICIAN: Catracho Treadwell MD    Treatment Diagnosis:   Pain in Right Knee (M25.561)  Stiffness of Right Knee, Not elsewhere classified (M25.661)    DATE OF SURGERY: 25  Assessment:   COMMENTS:  Mr. Rainey is present for a Prehab Physical Therapy Assessment for his upcoming right TKA. he is here with his private caregiver. After discussing the surgical admission options and discharge plans, he is planning on discharging to rehab if accepted.     Patient lost his wife over the winter.  His spouse had a private caregiver and that caregiver has stayed on to assist with patient.  Patient, however, is independent, active and drives.  He lives alone and is pretty insistent he needs to go to rehab at d/c.  He has plans to go to [facility]-appears his wife has been there before.  CM and PT both discussed the need for back up plan.  Patient reports he has spoken with the facility and he knows the PT there and will be in touch with them.  Patient also feels he should go to rehab because he does not want to be home alone while he is grieving.  He has a ROLLING WALKER and rollator from his spouse and reports he plans to use the rollator.  Discussed pros and cons of each device and patient feels he will do well with the rollator because he saw his wife use it and push it around.  Patient does have multiple sons and CM reports patient may be able to have assistance from them.  Patient was scheduled as a SDD so suggested he contact MD to let him know of his plans.      PROBLEM LIST:   (Impacting functional

## 2025-06-17 NOTE — PERIOP NOTE
Patient verified name and .    Order for consent was not found in EHR; patient verified.     Type 3 surgery, JOINT assessment complete.    Labs per surgeon: No orders received.   Labs per anesthesia protocol: cbc, cmp; results pending.  T&S DOS; order signed and held in EHR.   EKG: Completed 25; results within anesthesia guidelines.     Patient has been seen in the ED 6/15/25 with colitis, 25 with depression/suicidal ideation, and 25 with colitis.     The above will be reviewed by anesthesia after patient has hospital f/u with PCP on 25. Charge nurse to f/u.     MRSA/MSSA swab collected per policy. MD to consult pharmacy to dose Vanc if appropriate.     Hospital approved surgical skin cleanser and instructions to return bottle on DOS given per hospital policy.    Patient provided with handouts including Guide to Surgery, Pain Management, Preventing Surgical Site Infections, and [facility] Brochure.    Patient answered medical/surgical history questions at their best of ability. All prior to admission medications documented in Epic. Original medication prescription bottle was not visualized during patient appointment.     Patient instructed to hold all vitamins 3 weeks prior to surgery and NSAIDS 5 days prior to surgery.     Patient instructed to hold Dupixent (biologic)from today forward per anesthesia protocol.     Patient instructed to hold Plavix 7 days prior to surgery per anesthesia protocol.     Clearance to hold Plavix requested from [facility] via [employer]. Charge nurse to f/u.       Patient teach back successful and patient demonstrates knowledge of instruction.

## 2025-06-17 NOTE — CARE COORDINATION
[facility] Case Management note:  Patient screened in Prehab for discharge planning needs. Patient scheduled for a future total joint replacement.  We discussed Home Health and equipment needed after surgery. List of Home Health providers given.  Pt lost his wife this winter-does not want to be alone. Asking to go to [facility] where his was has been. I explained their criteria and requirements. Encouraged him to have back up plan. Has 4 sons. Maybe okay with [facility] acute rehab or [facility]. Not interested in skilled care per nurse friends recommendations.

## 2025-06-17 NOTE — PROGRESS NOTES
06/17/25 1100   Treatment   Treatment Type Bedside spirometry   Breath Sounds   Breath Sounds Bilateral Clear   Oxygen Therapy/Pulse Ox   O2 Therapy Room air   Pulse 71   SpO2 96 %   Pulse Oximeter Device Mode Intermittent   $Pulse Oximeter $Spot check (single)   Bedside Spirometry   FEV-1/Actual (Liters) 2.42 Liters   FEV-1/Predicted (Liters) 93 Liters     Initial respiratory Assessment completed with pt. Pt was interviewed and evaluated in [facility] prior to surgery.  Patient ID:  Stanislav Rainey  [id number]  79 y.o.  1945  Surgeon: Dr. Treadwell  Date of Surgery: [unfilled]7/11/2025  Procedure: Total Right Knee Arthroplasty  Primary Care Physician: Graham Hartmann [phone]  Specialists:    Pt taught proper COUGH technique  IS REVIEWED WITH PT AS WELL AS BENEFITS OF USING IS IN SEDENTARY PTS.  DIAPHRAGMATIC BREATHING EXERCISE INSTRUCTIONS GIVEN    History of smoking:   DENIES                 Quit date:         Secondhand smoke:MOTHER    Past procedures with Oxygen desaturation or delayed awakening:DENIES     Respiratory history:DENIES SOB                                                                   Respiratory meds:  DENIES    FAMILY PRESENT:            CAREGIVER  PAST SLEEP STUDY:                      DENIES  HX OF CHUCK:                                         DENIES  CHUCK assessment:      SWALLOWING ISSUES   DANGERS OF UNTREATED CHUCK EXPLAINED TO PT.                                          SLEEPS ON SIDE       &      BACK          PHYSICAL EXAM   Body mass index is 29.26 kg/m².   Vitals:    06/17/25 1100   BP:    Pulse: (P) 71   Resp:    Temp:    SpO2: (P) 96%     Neck circumference:   44   cm    Loud snoring:                                                       DENIES  Witnessed apnea or wakening gasping or choking:        DENIES         Awakens with headaches:                                               DENIES  Morning or daytime tiredness/ sleepiness:

## 2025-06-17 NOTE — TELEPHONE ENCOUNTER
Please note elevated WBC from lab work completed today (a6/17/25) during [facility]. Patient seen in ED on 6/15/25 with colitis and UTI. Patient has hospital f/u with PCP scheduled 6/19/25.    Latest Reference Range & Units 06/17/25 10:43   Sodium 136 - 145 mmol/L 138   Potassium 3.5 - 5.1 mmol/L 4.7   Chloride 98 - 107 mmol/L 104   CARBON DIOXIDE 20 - 29 mmol/L 23   BUN,BUNPL 8 - 23 MG/DL 27 (H)   Creatinine 0.80 - 1.30 MG/DL 0.99   Anion Gap 7 - 16 mmol/L 11   Est, Glom Filt Rate >60 ml/min/1.73m2 77   Glucose 70 - 99 mg/dL 137 (H)   Calcium 8.8 - 10.2 MG/DL 9.3   Albumin/Globulin Ratio 1.0 - 1.9   0.9 (L)   Total Protein 6.3 - 8.2 g/dL 6.7   Albumin 3.2 - 4.6 g/dL 3.2   Globulin 2.3 - 3.5 g/dL 3.5   Alkaline Phosphatase 40 - 129 U/L 82   ALT 8 - 55 U/L 56 (H)   AST 15 - 37 U/L 26   Total Bilirubin 0.0 - 1.2 MG/DL 0.7   WBC 4.3 - 11.1 K/uL 14.7 (H)   RBC 4.23 - 5.6 M/uL 4.26   Hemoglobin Quant 13.6 - 17.2 g/dL 14.8   Hematocrit 41.1 - 50.3 % 43.1   MCV 82.0 - 102.0 .2   MCH 26.1 - 32.9 PG 34.7 (H)   MCHC 31.4 - 35.0 g/dL 34.3   MPV 9.4 - 12.3 FL 10.0   RDW 11.9 - 14.6 % 12.6   Platelet Count 150 - 450 K/uL 218   Nucleated Red Blood Cells 0.0 - 0.2 K/uL 0.00   (H): Data is abnormally high  (L): Data is abnormally low

## 2025-06-17 NOTE — PERIOP NOTE
ALT results to be reviewed by anesthesia and PCP f/u appointment on 6/19/25. Charge nurse to f/u. All other lab results listed below are within anesthesia guidelines. Lab results routed to surgeon due to elevated WBC and of pt recent ED visit 6/15/25 with colitis and UTI.      Latest Reference Range & Units 06/17/25 10:43   Sodium 136 - 145 mmol/L 138   Potassium 3.5 - 5.1 mmol/L 4.7   Chloride 98 - 107 mmol/L 104   CARBON DIOXIDE 20 - 29 mmol/L 23   BUN,BUNPL 8 - 23 MG/DL 27 (H)   Creatinine 0.80 - 1.30 MG/DL 0.99   Anion Gap 7 - 16 mmol/L 11   Est, Glom Filt Rate >60 ml/min/1.73m2 77   Glucose 70 - 99 mg/dL 137 (H)   Calcium 8.8 - 10.2 MG/DL 9.3   Albumin/Globulin Ratio 1.0 - 1.9   0.9 (L)   Total Protein 6.3 - 8.2 g/dL 6.7   Albumin 3.2 - 4.6 g/dL 3.2   Globulin 2.3 - 3.5 g/dL 3.5   Alkaline Phosphatase 40 - 129 U/L 82   ALT 8 - 55 U/L 56 (H)   AST 15 - 37 U/L 26   Total Bilirubin 0.0 - 1.2 MG/DL 0.7   WBC 4.3 - 11.1 K/uL 14.7 (H)   RBC 4.23 - 5.6 M/uL 4.26   Hemoglobin Quant 13.6 - 17.2 g/dL 14.8   Hematocrit 41.1 - 50.3 % 43.1   MCV 82.0 - 102.0 .2   MCH 26.1 - 32.9 PG 34.7 (H)   MCHC 31.4 - 35.0 g/dL 34.3   MPV 9.4 - 12.3 FL 10.0   RDW 11.9 - 14.6 % 12.6   Platelet Count 150 - 450 K/uL 218   Nucleated Red Blood Cells 0.0 - 0.2 K/uL 0.00   (H): Data is abnormally high  (L): Data is abnormally low

## 2025-06-19 ENCOUNTER — OFFICE VISIT (OUTPATIENT)
Dept: INTERNAL MEDICINE CLINIC | Facility: CLINIC | Age: 80
End: 2025-06-19

## 2025-06-19 ENCOUNTER — TELEPHONE (OUTPATIENT)
Dept: SURGERY | Age: 80
End: 2025-06-19

## 2025-06-19 VITALS
SYSTOLIC BLOOD PRESSURE: 147 MMHG | BODY MASS INDEX: 31.83 KG/M2 | RESPIRATION RATE: 16 BRPM | WEIGHT: 210 LBS | DIASTOLIC BLOOD PRESSURE: 78 MMHG | TEMPERATURE: 98.1 F | HEART RATE: 52 BPM | HEIGHT: 68 IN | OXYGEN SATURATION: 94 %

## 2025-06-19 DIAGNOSIS — N30.90 CYSTITIS: ICD-10-CM

## 2025-06-19 DIAGNOSIS — N30.00 ACUTE CYSTITIS WITHOUT HEMATURIA: Primary | ICD-10-CM

## 2025-06-19 DIAGNOSIS — K51.90 ULCERATIVE COLITIS WITHOUT COMPLICATIONS, UNSPECIFIED LOCATION (HCC): ICD-10-CM

## 2025-06-19 DIAGNOSIS — I10 PRIMARY HYPERTENSION: ICD-10-CM

## 2025-06-19 DIAGNOSIS — F43.21 GRIEF: ICD-10-CM

## 2025-06-19 LAB
APPEARANCE UR: CLEAR
BACTERIA URNS QL MICRO: NEGATIVE /HPF
BILIRUB UR QL: NEGATIVE
COLOR UR: ABNORMAL
EPI CELLS #/AREA URNS HPF: ABNORMAL /HPF (ref 0–5)
GLUCOSE UR STRIP.AUTO-MCNC: NEGATIVE MG/DL
HGB UR QL STRIP: NEGATIVE
HYALINE CASTS URNS QL MICRO: ABNORMAL /LPF
KETONES UR QL STRIP.AUTO: NEGATIVE MG/DL
LEUKOCYTE ESTERASE UR QL STRIP.AUTO: ABNORMAL
NITRITE UR QL STRIP.AUTO: NEGATIVE
PH UR STRIP: 6 (ref 5–9)
PROT UR STRIP-MCNC: NEGATIVE MG/DL
RBC #/AREA URNS HPF: ABNORMAL /HPF (ref 0–5)
SP GR UR REFRACTOMETRY: 1.02 (ref 1–1.02)
UROBILINOGEN UR QL STRIP.AUTO: 0.2 EU/DL (ref 0.2–1)
WBC URNS QL MICRO: ABNORMAL /HPF (ref 0–4)

## 2025-06-19 RX ORDER — PREDNISONE 20 MG/1
TABLET ORAL
COMMUNITY
Start: 2025-06-15

## 2025-06-19 RX ORDER — CEFPROZIL 500 MG/1
500 TABLET, FILM COATED ORAL 2 TIMES DAILY
COMMUNITY
Start: 2025-06-15 | End: 2025-06-19 | Stop reason: ALTCHOICE

## 2025-06-19 RX ORDER — LOSARTAN POTASSIUM 50 MG/1
50 TABLET ORAL DAILY
COMMUNITY

## 2025-06-19 RX ORDER — CIPROFLOXACIN 500 MG/1
500 TABLET, FILM COATED ORAL 2 TIMES DAILY
Qty: 14 TABLET | Refills: 0 | Status: SHIPPED | OUTPATIENT
Start: 2025-06-19 | End: 2025-06-26

## 2025-06-19 ASSESSMENT — ENCOUNTER SYMPTOMS
CONSTIPATION: 0
NAUSEA: 0
VOMITING: 0
SHORTNESS OF BREATH: 0
DIARRHEA: 0

## 2025-06-19 ASSESSMENT — PROMIS GLOBAL HEALTH SCALE
IN GENERAL, HOW WOULD YOU RATE YOUR MENTAL HEALTH, INCLUDING YOUR MOOD AND YOUR ABILITY TO THINK [ON A SCALE OF 1 (POOR) TO 5 (EXCELLENT)]?: FAIR
SUM OF RESPONSES TO QUESTIONS 3, 6, 7, & 8: 11
IN THE PAST 7 DAYS, HOW WOULD YOU RATE YOUR PAIN ON AVERAGE [ON A SCALE FROM 0 (NO PAIN) TO 10 (WORST IMAGINABLE PAIN)]?: 0 NO PAIN
IN GENERAL, HOW WOULD YOU RATE YOUR SATISFACTION WITH YOUR SOCIAL ACTIVITIES AND RELATIONSHIPS [ON A SCALE OF 1 (POOR) TO 5 (EXCELLENT)]?: VERY GOOD
IN THE PAST 7 DAYS, HOW OFTEN HAVE YOU BEEN BOTHERED BY EMOTIONAL PROBLEMS, SUCH AS FEELING ANXIOUS, DEPRESSED, OR IRRITABLE [ON A SCALE FROM 1 (NEVER) TO 5 (ALWAYS)]?: SOMETIMES
HOW IS THE PROMIS V1.1 BEING ADMINISTERED?: PAPER
IN THE PAST 7 DAYS, HOW WOULD YOU RATE YOUR FATIGUE ON AVERAGE [ON A SCALE FROM 1 (NONE) TO 5 (VERY SEVERE)]?: MILD
IN GENERAL, WOULD YOU SAY YOUR QUALITY OF LIFE IS...[ON A SCALE OF 1 (POOR) TO 5 (EXCELLENT)]: EXCELLENT
IN GENERAL, PLEASE RATE HOW WELL YOU CARRY OUT YOUR USUAL SOCIAL ACTIVITIES (INCLUDES ACTIVITIES AT HOME, AT WORK, AND IN YOUR COMMUNITY, AND RESPONSIBILITIES AS A PARENT, CHILD, SPOUSE, EMPLOYEE, FRIEND, ETC) [ON A SCALE OF 1 (POOR) TO 5 (EXCELLENT)]?: EXCELLENT
WHO IS THE PERSON COMPLETING THE PROMIS V1.1 SURVEY?: SELF
SUM OF RESPONSES TO QUESTIONS 2, 4, 5, & 10: 14
IN GENERAL, HOW WOULD YOU RATE YOUR PHYSICAL HEALTH [ON A SCALE OF 1 (POOR) TO 5 (EXCELLENT)]?: GOOD
IN GENERAL, WOULD YOU SAY YOUR HEALTH IS...[ON A SCALE OF 1 (POOR) TO 5 (EXCELLENT)]: GOOD
TO WHAT EXTENT ARE YOU ABLE TO CARRY OUT YOUR EVERYDAY PHYSICAL ACTIVITIES SUCH AS WALKING, CLIMBING STAIRS, CARRYING GROCERIES, OR MOVING A CHAIR [ON A SCALE OF 1 (NOT AT ALL) TO 5 (COMPLETELY)]?: MOSTLY

## 2025-06-19 NOTE — PERIOP NOTE
Per  \"Make sure surgeon office is ok with UTI, but from anesthetic standpoint ok to proceed. JW \"  case message sent to office to inform of UTI.

## 2025-06-19 NOTE — PROGRESS NOTES
6/19/2025 11:59 AM  Location:[facility] PHYSICIAN SERVICES  [facility]  [place]  Patient #:  [id number]  YOB: 1945    Reason for Visit  ED Follow-up Patient presents in the office today for an ER follow up.  Patient went to the ER 4 days ago c/o dysuria and pelvic pressure.  Patient was dx with a UTI and rx'd abx, and to f/u with pcp.  Patient states he is still having burning with urination.   (ABNORMAL) Culture Urine Bacterial (06/15/2025 9:06 AM EDT)  Lab Results - (ABNORMAL) Culture Urine Bacterial (06/15/2025 9:06 AM EDT)  Component Value Ref Range Test Method Analysis Time Performed At [facility] Signature   Culture, Urine 50,000 CFU/mL Escherichia coli (A)   QUITA  06/17/2025 9:07 AM EDT [facility]       Lab Results - (ABNORMAL) Culture Urine Bacterial (06/15/2025 9:06 AM EDT)  Specimen (Source) Anatomical Location / Laterality Collection Method / Volume Collection Time Received Time   Urine Urine specimen from urethra / Unknown Non-blood Collection / Unknown 06/15/2025 9:06 AM EDT 06/15/2025 9:15 AM EDT     Lab Results - (ABNORMAL) Culture Urine Bacterial (06/15/2025 9:06 AM EDT)  Narrative         Lab Results - (ABNORMAL) Culture Urine Bacterial (06/15/2025 9:06 AM EDT)  Organism Antibiotic Method Susceptibility   Escherichia coli Amoxicillin/Clavulanate QUITA <=8/4 ug/ml: Susceptible    Escherichia coli Ampicillin QUITA >16 ug/ml: Resistant    Escherichia coli Ampicillin/Sulbactam QUITA 16/8 ug/ml: Intermediate    Escherichia coli Aztreonam QUITA <=4 ug/ml: Susceptible    Escherichia coli Cefazolin QUITA <=2 ug/ml: Susceptible    Escherichia coli Ceftriaxone QUITA <=1 ug/ml: Susceptible    Escherichia coli Ciprofloxacin QUITA <=0.25 ug/ml: Susceptible    Escherichia coli Gentamicin QUITA <=2 ug/ml: Susceptible    Escherichia coli Levofloxacin QUITA <=0.5 ug/ml: Susceptible    Escherichia coli Nitrofurantoin QUITA <=32 ug/ml: Susceptible    Escherichia coli

## 2025-06-19 NOTE — TELEPHONE ENCOUNTER
CARDIAC CLEARANCE    Medication Clearance    Physician or Practice Requesting Clearance: [facility]  : Nancy Briggs RN  Contact Phone Number: [phone]  Contact Fax Number: [phone]  Date of Surgery/Procedure: 7/11/25  Type of Surgery or Procedure: R TKA  Type of Anesthesia: SPINAL  Medication to hold: plavix  Requested # of days to hold: 7

## 2025-06-20 ENCOUNTER — CARE COORDINATION (OUTPATIENT)
Dept: CARE COORDINATION | Facility: CLINIC | Age: 80
End: 2025-06-20

## 2025-06-20 NOTE — CARE COORDINATION
l  Ambulatory Care Coordination Note     6/20/2025 2:49 PM     patient outreach attempt by this ACM today to offer care management services. ACM was unable to reach the patient by telephone today;   left voice message requesting a return phone call to this ACM.  Patient had ED visit on 6/15/2025 at [facility] for Acute cystitis with hematuria (Primary Dx);  Colitis  Discharge Disposition: Home or Self Care   Patient has had 3 ED visits over the past 6 months   Patient has f/u with PCP on 6/19/2025     Patient closed (unable to reach patient) from the High Risk Care Management program on 6/20/2025.  No further Ambulatory Care Manager follow up scheduled.

## 2025-06-20 NOTE — PERIOP NOTE
\"Cristal Levine, NICK - Nadiya Porras RN  Ok to hold Plavix. Thanks!  Cristal Levine, SMITHAP-C  [facility]\"    Received via [employer]

## 2025-06-21 LAB
BACTERIA SPEC CULT: NORMAL
SERVICE CMNT-IMP: NORMAL

## 2025-06-22 ENCOUNTER — RESULTS FOLLOW-UP (OUTPATIENT)
Dept: INTERNAL MEDICINE CLINIC | Facility: CLINIC | Age: 80
End: 2025-06-22

## 2025-06-30 ENCOUNTER — PREP FOR PROCEDURE (OUTPATIENT)
Dept: ORTHOPEDIC SURGERY | Age: 80
End: 2025-06-30

## 2025-06-30 DIAGNOSIS — M17.11 PRIMARY OSTEOARTHRITIS OF RIGHT KNEE: Primary | ICD-10-CM

## 2025-07-07 NOTE — H&P
H&P    Patient ID:  Stanislav Rainey  [id number]  79 y.o.  1945  Surgeon:  Catracho Treadwell MD  Date of Surgery: * No surgery found *  Procedure: Robot assisted right Total Knee Arthroplasty  Primary Care Physician: Graham Hartmann MD        Subjective:  Stanislav Rainey is a 79 y.o. White (non-) male who presents with right knee pain.  They have a history of right knee pain for several months. Symptoms worse with walking long distances and relieved with rest. Conservative treatment consisting of  activity modification and injections have not helped. The patient lives with their family. The patients goal after surgery is improved pain and function.        Past Medical History:   Diagnosis Date    Acute pancreatitis 8/17/2015    Allergic rhinitis 12/6/2015    Arthritis     OA    Backache, unspecified 8/17/2015    Benign neoplasm of colon 08/17/2015    hx    Benign neoplasm of other specified sites of skin 8/17/2015    Benign non-nodular prostatic hyperplasia with lower urinary tract symptoms 8/17/2015    Cervical arthritis 5/30/2017    Chronic sinusitis 8/17/2015    Depressive disorder, not elsewhere classified 8/17/2015    managed with medication    Deviated nasal septum 9/20/2016    Esophageal reflux 8/17/2015    Family history of sudden cardiac death     GERD (gastroesophageal reflux disease)     controlled w/med    History of COVID-19 03/2020    \"flu like symptoms\", HA    Hypertrophy of prostate with urinary obstruction and other lower urinary tract symptoms (LUTS) 08/17/2015    managed with medication, followed by PCP Dr. Hartmann     Mixed hearing loss 09/20/2016    bilateral hearing aids     Osteoarthritis of multiple joints 8/22/2018    Other and unspecified noninfectious gastroenteritis and colitis(558.9) 08/17/2015    Other malaise and fatigue 8/17/2015    Other urethral bulbous stricture, male 12/28/2018    Peripheral neuropathy 8/17/2015    managed with 
4 = No assist / stand by assistance

## 2025-07-09 ENCOUNTER — OFFICE VISIT (OUTPATIENT)
Dept: ORTHOPEDIC SURGERY | Age: 80
End: 2025-07-09

## 2025-07-09 DIAGNOSIS — M17.11 PRIMARY OSTEOARTHRITIS OF RIGHT KNEE: Primary | ICD-10-CM

## 2025-07-09 RX ORDER — METHOCARBAMOL 750 MG/1
TABLET, FILM COATED ORAL
Qty: 40 TABLET | Refills: 0 | Status: SHIPPED | OUTPATIENT
Start: 2025-07-09

## 2025-07-09 RX ORDER — OXYCODONE HYDROCHLORIDE 5 MG/1
5-10 TABLET ORAL EVERY 4 HOURS PRN
Qty: 60 TABLET | Refills: 0 | Status: ON HOLD | OUTPATIENT
Start: 2025-07-09 | End: 2025-07-11

## 2025-07-09 RX ORDER — ONDANSETRON 4 MG/1
4 TABLET, FILM COATED ORAL EVERY 6 HOURS PRN
Qty: 30 TABLET | Refills: 0 | Status: SHIPPED | OUTPATIENT
Start: 2025-07-09

## 2025-07-09 NOTE — PROGRESS NOTES
Name: Stanislav Rainey  YOB: 1945  Gender: male  MRN: [id number]    Pre-Op     CC: RIGHT KNEE PAIN       This patient comes in for pre-op exam prior to RIGHT TKA.  The patient has been cleared preoperatively.  I counseled the patient once again about the risks of infection, DVT formation, expected time of hospitalization, anticipated recovery time as well as rehab needs and expectations for recovery.  The patient would like to proceed and we will do so as planned. The patient was provided with pain medications as well as DVT prophylaxis to have on hand postoperatively at the time of discharge from hospital. All pertinent questions asked by the patient were answered.    X-rays: AP, lateral and merchant views of the right knee are independently reviewed by me today showing moderate lateral joint space narrowing with valgus formation.  Also noted are moderate patellofemoral degenerative changes on lateral merchant views.  Overall impression is moderate valgus right knee NORMA Mcclellan

## 2025-07-10 DIAGNOSIS — I10 PRIMARY HYPERTENSION: ICD-10-CM

## 2025-07-10 DIAGNOSIS — E78.2 MIXED HYPERLIPIDEMIA: ICD-10-CM

## 2025-07-10 RX ORDER — ESCITALOPRAM OXALATE 10 MG/1
10 TABLET ORAL DAILY
Qty: 90 TABLET | Refills: 3 | Status: SHIPPED | OUTPATIENT
Start: 2025-07-10

## 2025-07-10 RX ORDER — CLOPIDOGREL BISULFATE 75 MG/1
75 TABLET ORAL DAILY
Qty: 90 TABLET | Refills: 3 | Status: SHIPPED | OUTPATIENT
Start: 2025-07-10

## 2025-07-10 RX ORDER — CITALOPRAM HYDROBROMIDE 10 MG/1
10 TABLET ORAL DAILY
Qty: 90 TABLET | Refills: 3 | Status: SHIPPED | OUTPATIENT
Start: 2025-07-10

## 2025-07-10 RX ORDER — ATORVASTATIN CALCIUM 20 MG/1
20 TABLET, FILM COATED ORAL DAILY
Qty: 90 TABLET | Refills: 3 | Status: SHIPPED | OUTPATIENT
Start: 2025-07-10

## 2025-07-10 RX ORDER — LOSARTAN POTASSIUM 50 MG/1
50 TABLET ORAL DAILY
Qty: 90 TABLET | Refills: 3 | Status: SHIPPED | OUTPATIENT
Start: 2025-07-10

## 2025-07-11 ENCOUNTER — ANESTHESIA EVENT (OUTPATIENT)
Dept: SURGERY | Age: 80
End: 2025-07-11
Payer: MEDICARE

## 2025-07-11 ENCOUNTER — HOSPITAL ENCOUNTER (OUTPATIENT)
Age: 80
Discharge: NURSING, REHAB OR LONG-TERM CARE | End: 2025-07-13
Attending: ORTHOPAEDIC SURGERY | Admitting: ORTHOPAEDIC SURGERY
Payer: MEDICARE

## 2025-07-11 ENCOUNTER — ANESTHESIA (OUTPATIENT)
Dept: SURGERY | Age: 80
End: 2025-07-11
Payer: MEDICARE

## 2025-07-11 DIAGNOSIS — M17.11 PRIMARY OSTEOARTHRITIS OF RIGHT KNEE: ICD-10-CM

## 2025-07-11 PROBLEM — E78.5 HLD (HYPERLIPIDEMIA): Status: ACTIVE | Noted: 2025-07-11

## 2025-07-11 PROBLEM — F41.9 ANXIETY AND DEPRESSION: Status: ACTIVE | Noted: 2025-07-11

## 2025-07-11 PROBLEM — K21.9 GASTROESOPHAGEAL REFLUX DISEASE WITHOUT ESOPHAGITIS: Status: ACTIVE | Noted: 2025-07-11

## 2025-07-11 PROBLEM — I10 PRIMARY HYPERTENSION: Status: ACTIVE | Noted: 2025-07-11

## 2025-07-11 PROBLEM — F32.A ANXIETY AND DEPRESSION: Status: ACTIVE | Noted: 2025-07-11

## 2025-07-11 PROCEDURE — 20985 CPTR-ASST DIR MS PX: CPT | Performed by: ORTHOPAEDIC SURGERY

## 2025-07-11 PROCEDURE — 6360000002 HC RX W HCPCS: Performed by: ANESTHESIOLOGY

## 2025-07-11 PROCEDURE — 2580000003 HC RX 258: Performed by: ANESTHESIOLOGY

## 2025-07-11 PROCEDURE — 97530 THERAPEUTIC ACTIVITIES: CPT

## 2025-07-11 PROCEDURE — 2500000003 HC RX 250 WO HCPCS: Performed by: NURSE ANESTHETIST, CERTIFIED REGISTERED

## 2025-07-11 PROCEDURE — 27447 TOTAL KNEE ARTHROPLASTY: CPT | Performed by: ORTHOPAEDIC SURGERY

## 2025-07-11 PROCEDURE — 7100000000 HC PACU RECOVERY - FIRST 15 MIN: Performed by: ORTHOPAEDIC SURGERY

## 2025-07-11 PROCEDURE — 97161 PT EVAL LOW COMPLEX 20 MIN: CPT

## 2025-07-11 PROCEDURE — C1713 ANCHOR/SCREW BN/BN,TIS/BN: HCPCS | Performed by: ORTHOPAEDIC SURGERY

## 2025-07-11 PROCEDURE — 3700000001 HC ADD 15 MINUTES (ANESTHESIA): Performed by: ORTHOPAEDIC SURGERY

## 2025-07-11 PROCEDURE — C1776 JOINT DEVICE (IMPLANTABLE): HCPCS | Performed by: ORTHOPAEDIC SURGERY

## 2025-07-11 PROCEDURE — 94761 N-INVAS EAR/PLS OXIMETRY MLT: CPT

## 2025-07-11 PROCEDURE — 3700000000 HC ANESTHESIA ATTENDED CARE: Performed by: ORTHOPAEDIC SURGERY

## 2025-07-11 PROCEDURE — 2500000003 HC RX 250 WO HCPCS: Performed by: PHYSICIAN ASSISTANT

## 2025-07-11 PROCEDURE — 3600000005 HC SURGERY LEVEL 5 BASE: Performed by: ORTHOPAEDIC SURGERY

## 2025-07-11 PROCEDURE — 6360000002 HC RX W HCPCS: Performed by: ORTHOPAEDIC SURGERY

## 2025-07-11 PROCEDURE — 6360000002 HC RX W HCPCS: Performed by: PHYSICIAN ASSISTANT

## 2025-07-11 PROCEDURE — 2500000003 HC RX 250 WO HCPCS: Performed by: ORTHOPAEDIC SURGERY

## 2025-07-11 PROCEDURE — 7100000001 HC PACU RECOVERY - ADDTL 15 MIN: Performed by: ORTHOPAEDIC SURGERY

## 2025-07-11 PROCEDURE — 64447 NJX AA&/STRD FEMORAL NRV IMG: CPT | Performed by: ANESTHESIOLOGY

## 2025-07-11 PROCEDURE — 2709999900 HC NON-CHARGEABLE SUPPLY: Performed by: ORTHOPAEDIC SURGERY

## 2025-07-11 PROCEDURE — 3600000015 HC SURGERY LEVEL 5 ADDTL 15MIN: Performed by: ORTHOPAEDIC SURGERY

## 2025-07-11 PROCEDURE — 2720000010 HC SURG SUPPLY STERILE: Performed by: ORTHOPAEDIC SURGERY

## 2025-07-11 PROCEDURE — 6360000002 HC RX W HCPCS: Performed by: NURSE ANESTHETIST, CERTIFIED REGISTERED

## 2025-07-11 PROCEDURE — 6370000000 HC RX 637 (ALT 250 FOR IP): Performed by: PHYSICIAN ASSISTANT

## 2025-07-11 PROCEDURE — 2580000003 HC RX 258: Performed by: ORTHOPAEDIC SURGERY

## 2025-07-11 DEVICE — ATTUNE KNEE SYSTEM TIBIAL INSERT FIXED BEARING MEDIAL STABILIZED RIGHT AOX 7, 7MM
Type: IMPLANTABLE DEVICE | Site: KNEE | Status: FUNCTIONAL
Brand: ATTUNE

## 2025-07-11 DEVICE — CEMENT BNE 20GM HALF DOSE PMMA VISC RADPQ FAST: Type: IMPLANTABLE DEVICE | Site: KNEE | Status: FUNCTIONAL

## 2025-07-11 DEVICE — ATTUNE KNEE SYSTEM FEMORAL POROCOAT CRUCIATE RETAINING SIZE 7 RIGHT CEMENTLESS
Type: IMPLANTABLE DEVICE | Site: KNEE | Status: FUNCTIONAL
Brand: ATTUNE

## 2025-07-11 DEVICE — ATTUNE KNEE SYSTEM TIBIAL BASE AFFIXIUM FIXED BEARING SIZE 6
Type: IMPLANTABLE DEVICE | Site: KNEE | Status: FUNCTIONAL
Brand: ATTUNE AFFIXIUM

## 2025-07-11 DEVICE — KNEE K2 TOT HEMI ADV CMTLS IMPL CAPPED SYNTHES: Type: IMPLANTABLE DEVICE | Status: FUNCTIONAL

## 2025-07-11 DEVICE — ATTUNE PATELLA MEDIALIZED ANATOMIC 38MM CEMENTED AOX
Type: IMPLANTABLE DEVICE | Site: KNEE | Status: FUNCTIONAL
Brand: ATTUNE

## 2025-07-11 RX ORDER — SODIUM CHLORIDE, SODIUM LACTATE, POTASSIUM CHLORIDE, CALCIUM CHLORIDE 600; 310; 30; 20 MG/100ML; MG/100ML; MG/100ML; MG/100ML
INJECTION, SOLUTION INTRAVENOUS CONTINUOUS
Status: DISCONTINUED | OUTPATIENT
Start: 2025-07-11 | End: 2025-07-11 | Stop reason: HOSPADM

## 2025-07-11 RX ORDER — SENNA AND DOCUSATE SODIUM 50; 8.6 MG/1; MG/1
1 TABLET, FILM COATED ORAL 2 TIMES DAILY
Status: DISCONTINUED | OUTPATIENT
Start: 2025-07-11 | End: 2025-07-13 | Stop reason: HOSPADM

## 2025-07-11 RX ORDER — MAGNESIUM HYDROXIDE/ALUMINUM HYDROXICE/SIMETHICONE 120; 1200; 1200 MG/30ML; MG/30ML; MG/30ML
15 SUSPENSION ORAL EVERY 6 HOURS PRN
Status: DISCONTINUED | OUTPATIENT
Start: 2025-07-11 | End: 2025-07-13 | Stop reason: HOSPADM

## 2025-07-11 RX ORDER — DIPHENHYDRAMINE HCL 25 MG
25 CAPSULE ORAL EVERY 6 HOURS PRN
Status: DISCONTINUED | OUTPATIENT
Start: 2025-07-11 | End: 2025-07-13 | Stop reason: HOSPADM

## 2025-07-11 RX ORDER — OXYCODONE HYDROCHLORIDE 5 MG/1
5 TABLET ORAL
Status: DISCONTINUED | OUTPATIENT
Start: 2025-07-11 | End: 2025-07-11 | Stop reason: HOSPADM

## 2025-07-11 RX ORDER — HYDROMORPHONE HYDROCHLORIDE 1 MG/ML
1 INJECTION, SOLUTION INTRAMUSCULAR; INTRAVENOUS; SUBCUTANEOUS
Status: DISCONTINUED | OUTPATIENT
Start: 2025-07-11 | End: 2025-07-13 | Stop reason: HOSPADM

## 2025-07-11 RX ORDER — ATORVASTATIN CALCIUM 10 MG/1
20 TABLET, FILM COATED ORAL DAILY
Status: DISCONTINUED | OUTPATIENT
Start: 2025-07-12 | End: 2025-07-13 | Stop reason: HOSPADM

## 2025-07-11 RX ORDER — OXYCODONE HYDROCHLORIDE 5 MG/1
5-10 TABLET ORAL EVERY 4 HOURS PRN
Qty: 60 TABLET | Refills: 0 | Status: SHIPPED | OUTPATIENT
Start: 2025-07-11 | End: 2025-07-11

## 2025-07-11 RX ORDER — HYDRALAZINE HYDROCHLORIDE 20 MG/ML
5 INJECTION INTRAMUSCULAR; INTRAVENOUS EVERY 8 HOURS PRN
Status: DISCONTINUED | OUTPATIENT
Start: 2025-07-11 | End: 2025-07-13 | Stop reason: HOSPADM

## 2025-07-11 RX ORDER — CITALOPRAM HYDROBROMIDE 10 MG/1
10 TABLET ORAL DAILY
Status: DISCONTINUED | OUTPATIENT
Start: 2025-07-12 | End: 2025-07-11

## 2025-07-11 RX ORDER — OXYCODONE HYDROCHLORIDE 5 MG/1
5-10 TABLET ORAL EVERY 4 HOURS PRN
Qty: 60 TABLET | Refills: 0 | Status: SHIPPED | OUTPATIENT
Start: 2025-07-11 | End: 2025-07-18

## 2025-07-11 RX ORDER — EPHEDRINE SULFATE/0.9% NACL/PF 50 MG/5 ML
SYRINGE (ML) INTRAVENOUS
Status: DISCONTINUED | OUTPATIENT
Start: 2025-07-11 | End: 2025-07-11 | Stop reason: SDUPTHER

## 2025-07-11 RX ORDER — HYDROMORPHONE HYDROCHLORIDE 1 MG/ML
0.5 INJECTION, SOLUTION INTRAMUSCULAR; INTRAVENOUS; SUBCUTANEOUS
Status: DISCONTINUED | OUTPATIENT
Start: 2025-07-11 | End: 2025-07-13 | Stop reason: HOSPADM

## 2025-07-11 RX ORDER — OXYCODONE HYDROCHLORIDE 5 MG/1
5 TABLET ORAL EVERY 4 HOURS PRN
Status: DISCONTINUED | OUTPATIENT
Start: 2025-07-11 | End: 2025-07-13 | Stop reason: HOSPADM

## 2025-07-11 RX ORDER — SODIUM CHLORIDE 9 MG/ML
INJECTION, SOLUTION INTRAVENOUS CONTINUOUS
Status: DISCONTINUED | OUTPATIENT
Start: 2025-07-11 | End: 2025-07-13 | Stop reason: HOSPADM

## 2025-07-11 RX ORDER — DIPHENHYDRAMINE HYDROCHLORIDE 50 MG/ML
25 INJECTION, SOLUTION INTRAMUSCULAR; INTRAVENOUS EVERY 6 HOURS PRN
Status: DISCONTINUED | OUTPATIENT
Start: 2025-07-11 | End: 2025-07-13 | Stop reason: HOSPADM

## 2025-07-11 RX ORDER — SODIUM CHLORIDE 0.9 % (FLUSH) 0.9 %
5-40 SYRINGE (ML) INJECTION PRN
Status: DISCONTINUED | OUTPATIENT
Start: 2025-07-11 | End: 2025-07-13 | Stop reason: HOSPADM

## 2025-07-11 RX ORDER — SODIUM CHLORIDE 9 MG/ML
INJECTION, SOLUTION INTRAVENOUS PRN
Status: DISCONTINUED | OUTPATIENT
Start: 2025-07-11 | End: 2025-07-11 | Stop reason: HOSPADM

## 2025-07-11 RX ORDER — SODIUM CHLORIDE 0.9 % (FLUSH) 0.9 %
5-40 SYRINGE (ML) INJECTION EVERY 12 HOURS SCHEDULED
Status: DISCONTINUED | OUTPATIENT
Start: 2025-07-11 | End: 2025-07-13 | Stop reason: HOSPADM

## 2025-07-11 RX ORDER — PROMETHAZINE HYDROCHLORIDE 25 MG/1
25 TABLET ORAL EVERY 6 HOURS PRN
Status: DISCONTINUED | OUTPATIENT
Start: 2025-07-11 | End: 2025-07-13 | Stop reason: HOSPADM

## 2025-07-11 RX ORDER — MIDAZOLAM HYDROCHLORIDE 2 MG/2ML
2 INJECTION, SOLUTION INTRAMUSCULAR; INTRAVENOUS
Status: COMPLETED | OUTPATIENT
Start: 2025-07-11 | End: 2025-07-11

## 2025-07-11 RX ORDER — PANTOPRAZOLE SODIUM 40 MG/1
40 TABLET, DELAYED RELEASE ORAL DAILY
Status: DISCONTINUED | OUTPATIENT
Start: 2025-07-12 | End: 2025-07-13 | Stop reason: HOSPADM

## 2025-07-11 RX ORDER — DEXAMETHASONE SODIUM PHOSPHATE 10 MG/ML
10 INJECTION, SOLUTION INTRA-ARTICULAR; INTRALESIONAL; INTRAMUSCULAR; INTRAVENOUS; SOFT TISSUE EVERY 6 HOURS
Status: ACTIVE | OUTPATIENT
Start: 2025-07-11 | End: 2025-07-11

## 2025-07-11 RX ORDER — CELECOXIB 100 MG/1
100 CAPSULE ORAL ONCE
Status: DISCONTINUED | OUTPATIENT
Start: 2025-07-11 | End: 2025-07-11 | Stop reason: HOSPADM

## 2025-07-11 RX ORDER — DEXAMETHASONE SODIUM PHOSPHATE 4 MG/ML
INJECTION, SOLUTION INTRA-ARTICULAR; INTRALESIONAL; INTRAMUSCULAR; INTRAVENOUS; SOFT TISSUE
Status: COMPLETED | OUTPATIENT
Start: 2025-07-11 | End: 2025-07-11

## 2025-07-11 RX ORDER — ESCITALOPRAM OXALATE 10 MG/1
10 TABLET ORAL DAILY
Status: DISCONTINUED | OUTPATIENT
Start: 2025-07-12 | End: 2025-07-13 | Stop reason: HOSPADM

## 2025-07-11 RX ORDER — OXYCODONE HYDROCHLORIDE 5 MG/1
10 TABLET ORAL EVERY 4 HOURS PRN
Status: DISCONTINUED | OUTPATIENT
Start: 2025-07-11 | End: 2025-07-13 | Stop reason: HOSPADM

## 2025-07-11 RX ORDER — SODIUM CHLORIDE 0.9 % (FLUSH) 0.9 %
5-40 SYRINGE (ML) INJECTION EVERY 12 HOURS SCHEDULED
Status: DISCONTINUED | OUTPATIENT
Start: 2025-07-11 | End: 2025-07-11 | Stop reason: HOSPADM

## 2025-07-11 RX ORDER — METHOCARBAMOL 750 MG/1
750 TABLET, FILM COATED ORAL 4 TIMES DAILY PRN
Status: DISCONTINUED | OUTPATIENT
Start: 2025-07-11 | End: 2025-07-13 | Stop reason: HOSPADM

## 2025-07-11 RX ORDER — ROPIVACAINE HYDROCHLORIDE 2 MG/ML
INJECTION, SOLUTION EPIDURAL; INFILTRATION; PERINEURAL PRN
Status: DISCONTINUED | OUTPATIENT
Start: 2025-07-11 | End: 2025-07-11 | Stop reason: ALTCHOICE

## 2025-07-11 RX ORDER — GABAPENTIN 100 MG/1
100 CAPSULE ORAL NIGHTLY
Status: DISCONTINUED | OUTPATIENT
Start: 2025-07-11 | End: 2025-07-13 | Stop reason: HOSPADM

## 2025-07-11 RX ORDER — SODIUM CHLORIDE 0.9 % (FLUSH) 0.9 %
5-40 SYRINGE (ML) INJECTION PRN
Status: DISCONTINUED | OUTPATIENT
Start: 2025-07-11 | End: 2025-07-11 | Stop reason: HOSPADM

## 2025-07-11 RX ORDER — ACETAMINOPHEN 325 MG/1
650 TABLET ORAL EVERY 6 HOURS
Status: DISCONTINUED | OUTPATIENT
Start: 2025-07-11 | End: 2025-07-13 | Stop reason: HOSPADM

## 2025-07-11 RX ORDER — ACETAMINOPHEN 500 MG
1000 TABLET ORAL ONCE
Status: DISCONTINUED | OUTPATIENT
Start: 2025-07-11 | End: 2025-07-11 | Stop reason: HOSPADM

## 2025-07-11 RX ORDER — ASPIRIN 81 MG/1
81 TABLET ORAL 2 TIMES DAILY
Status: DISCONTINUED | OUTPATIENT
Start: 2025-07-11 | End: 2025-07-13 | Stop reason: HOSPADM

## 2025-07-11 RX ORDER — PROPOFOL 10 MG/ML
INJECTION, EMULSION INTRAVENOUS
Status: DISCONTINUED | OUTPATIENT
Start: 2025-07-11 | End: 2025-07-11 | Stop reason: SDUPTHER

## 2025-07-11 RX ORDER — TRANEXAMIC ACID 100 MG/ML
INJECTION, SOLUTION INTRAVENOUS
Status: DISCONTINUED | OUTPATIENT
Start: 2025-07-11 | End: 2025-07-11 | Stop reason: SDUPTHER

## 2025-07-11 RX ORDER — SODIUM CHLORIDE 9 MG/ML
INJECTION, SOLUTION INTRAVENOUS PRN
Status: DISCONTINUED | OUTPATIENT
Start: 2025-07-11 | End: 2025-07-13 | Stop reason: HOSPADM

## 2025-07-11 RX ORDER — FENTANYL CITRATE 50 UG/ML
100 INJECTION, SOLUTION INTRAMUSCULAR; INTRAVENOUS
Status: COMPLETED | OUTPATIENT
Start: 2025-07-11 | End: 2025-07-11

## 2025-07-11 RX ORDER — ONDANSETRON 2 MG/ML
4 INJECTION INTRAMUSCULAR; INTRAVENOUS EVERY 6 HOURS PRN
Status: DISCONTINUED | OUTPATIENT
Start: 2025-07-11 | End: 2025-07-13 | Stop reason: HOSPADM

## 2025-07-11 RX ORDER — ONDANSETRON 2 MG/ML
INJECTION INTRAMUSCULAR; INTRAVENOUS
Status: DISCONTINUED | OUTPATIENT
Start: 2025-07-11 | End: 2025-07-11 | Stop reason: SDUPTHER

## 2025-07-11 RX ORDER — NALOXONE HYDROCHLORIDE 0.4 MG/ML
0.4 INJECTION, SOLUTION INTRAMUSCULAR; INTRAVENOUS; SUBCUTANEOUS PRN
Status: DISCONTINUED | OUTPATIENT
Start: 2025-07-11 | End: 2025-07-13 | Stop reason: HOSPADM

## 2025-07-11 RX ORDER — LOSARTAN POTASSIUM 50 MG/1
50 TABLET ORAL DAILY
Status: DISCONTINUED | OUTPATIENT
Start: 2025-07-12 | End: 2025-07-13 | Stop reason: HOSPADM

## 2025-07-11 RX ORDER — LIDOCAINE HYDROCHLORIDE 10 MG/ML
1 INJECTION, SOLUTION INFILTRATION; PERINEURAL
Status: DISCONTINUED | OUTPATIENT
Start: 2025-07-11 | End: 2025-07-11 | Stop reason: HOSPADM

## 2025-07-11 RX ORDER — ONDANSETRON 2 MG/ML
4 INJECTION INTRAMUSCULAR; INTRAVENOUS
Status: DISCONTINUED | OUTPATIENT
Start: 2025-07-11 | End: 2025-07-11 | Stop reason: HOSPADM

## 2025-07-11 RX ORDER — KETOROLAC TROMETHAMINE 30 MG/ML
INJECTION, SOLUTION INTRAMUSCULAR; INTRAVENOUS PRN
Status: DISCONTINUED | OUTPATIENT
Start: 2025-07-11 | End: 2025-07-11 | Stop reason: ALTCHOICE

## 2025-07-11 RX ORDER — CITALOPRAM HYDROBROMIDE 10 MG/1
10 TABLET ORAL DAILY
Status: DISCONTINUED | OUTPATIENT
Start: 2025-07-12 | End: 2025-07-11 | Stop reason: SDUPTHER

## 2025-07-11 RX ORDER — PROCHLORPERAZINE EDISYLATE 5 MG/ML
5 INJECTION INTRAMUSCULAR; INTRAVENOUS
Status: DISCONTINUED | OUTPATIENT
Start: 2025-07-11 | End: 2025-07-11 | Stop reason: HOSPADM

## 2025-07-11 RX ORDER — DEXAMETHASONE SODIUM PHOSPHATE 10 MG/ML
INJECTION, SOLUTION INTRA-ARTICULAR; INTRALESIONAL; INTRAMUSCULAR; INTRAVENOUS; SOFT TISSUE
Status: DISCONTINUED | OUTPATIENT
Start: 2025-07-11 | End: 2025-07-11 | Stop reason: SDUPTHER

## 2025-07-11 RX ADMIN — OXYCODONE 5 MG: 5 TABLET ORAL at 17:48

## 2025-07-11 RX ADMIN — ASPIRIN 81 MG: 81 TABLET, COATED ORAL at 21:09

## 2025-07-11 RX ADMIN — DEXAMETHASONE SODIUM PHOSPHATE 4 MG: 4 INJECTION INTRA-ARTICULAR; INTRALESIONAL; INTRAMUSCULAR; INTRAVENOUS; SOFT TISSUE at 11:42

## 2025-07-11 RX ADMIN — Medication 2000 MG: at 12:53

## 2025-07-11 RX ADMIN — TRANEXAMIC ACID 1000 MG: 100 INJECTION, SOLUTION INTRAVENOUS at 14:15

## 2025-07-11 RX ADMIN — SODIUM CHLORIDE, SODIUM LACTATE, POTASSIUM CHLORIDE, AND CALCIUM CHLORIDE: 600; 310; 30; 20 INJECTION, SOLUTION INTRAVENOUS at 13:03

## 2025-07-11 RX ADMIN — PROPOFOL 100 MCG/KG/MIN: 10 INJECTION, EMULSION INTRAVENOUS at 12:51

## 2025-07-11 RX ADMIN — GABAPENTIN 100 MG: 100 CAPSULE ORAL at 21:09

## 2025-07-11 RX ADMIN — TRANEXAMIC ACID 1000 MG: 100 INJECTION, SOLUTION INTRAVENOUS at 12:44

## 2025-07-11 RX ADMIN — SENNOSIDES, DOCUSATE SODIUM 1 TABLET: 50; 8.6 TABLET, FILM COATED ORAL at 21:09

## 2025-07-11 RX ADMIN — CEFAZOLIN 2000 MG: 10 INJECTION, POWDER, FOR SOLUTION INTRAVENOUS at 21:14

## 2025-07-11 RX ADMIN — DEXAMETHASONE SODIUM PHOSPHATE 10 MG: 10 INJECTION INTRAMUSCULAR; INTRAVENOUS at 12:51

## 2025-07-11 RX ADMIN — ACETAMINOPHEN 650 MG: 325 TABLET, FILM COATED ORAL at 23:30

## 2025-07-11 RX ADMIN — ONDANSETRON 4 MG: 2 INJECTION INTRAMUSCULAR; INTRAVENOUS at 12:51

## 2025-07-11 RX ADMIN — ACETAMINOPHEN 650 MG: 325 TABLET, FILM COATED ORAL at 17:48

## 2025-07-11 RX ADMIN — Medication 10 MG: at 13:36

## 2025-07-11 RX ADMIN — SODIUM CHLORIDE, PRESERVATIVE FREE 10 ML: 5 INJECTION INTRAVENOUS at 21:12

## 2025-07-11 RX ADMIN — MIDAZOLAM HYDROCHLORIDE 1 MG: 1 INJECTION, SOLUTION INTRAMUSCULAR; INTRAVENOUS at 11:41

## 2025-07-11 RX ADMIN — FENTANYL CITRATE 50 MCG: 50 INJECTION INTRAMUSCULAR; INTRAVENOUS at 11:41

## 2025-07-11 RX ADMIN — ROPIVACAINE HYDROCHLORIDE 20 ML: 2 INJECTION, SOLUTION EPIDURAL; INFILTRATION at 11:42

## 2025-07-11 RX ADMIN — Medication 10 MG: at 13:18

## 2025-07-11 RX ADMIN — SODIUM CHLORIDE, SODIUM LACTATE, POTASSIUM CHLORIDE, AND CALCIUM CHLORIDE: 600; 310; 30; 20 INJECTION, SOLUTION INTRAVENOUS at 10:13

## 2025-07-11 RX ADMIN — MEPIVACAINE HYDROCHLORIDE 50 MG: 20 INJECTION, SOLUTION EPIDURAL; INFILTRATION at 12:46

## 2025-07-11 RX ADMIN — SODIUM CHLORIDE, SODIUM LACTATE, POTASSIUM CHLORIDE, AND CALCIUM CHLORIDE: 600; 310; 30; 20 INJECTION, SOLUTION INTRAVENOUS at 14:31

## 2025-07-11 RX ADMIN — Medication 5 MG: at 14:04

## 2025-07-11 ASSESSMENT — PAIN DESCRIPTION - LOCATION
LOCATION: KNEE
LOCATION: KNEE

## 2025-07-11 ASSESSMENT — PAIN DESCRIPTION - ORIENTATION
ORIENTATION: RIGHT
ORIENTATION: RIGHT

## 2025-07-11 ASSESSMENT — PAIN SCALES - GENERAL
PAINLEVEL_OUTOF10: 0
PAINLEVEL_OUTOF10: 3
PAINLEVEL_OUTOF10: 3

## 2025-07-11 ASSESSMENT — PAIN - FUNCTIONAL ASSESSMENT: PAIN_FUNCTIONAL_ASSESSMENT: 0-10

## 2025-07-11 ASSESSMENT — PAIN DESCRIPTION - DESCRIPTORS: DESCRIPTORS: ACHING

## 2025-07-11 NOTE — OP NOTE
[facility]  Velys Robot Assisted Cementless Total Knee Arthroplasty - MS  Patient:Stanislav Rainey   : 1945  Medical Record Number:[id number]  Pre-operative Diagnosis:Severe Right Knee DJD    Post-operative Diagnosis: Same    Surgeon: JIMENEZ SORIANO MD  Assistant: Soham Bronson CFA    This surgical assistant was present for the procedure and vital for the performance of the procedure. He assisted with exposure and retraction during the procedure as well as wound closure and dressing application after the procedure.    Anesthesia:   Spinal and Adductor Nerve Block    Procedure: Total Knee Arthroplasty   The complexity of the total joint surgery requires the use of a first assistant for positioning, retraction and assistance in closure. The patient's Body mass index is 31.85 kg/m²., BMI's greater then 35 make surgical exposure and retraction extremely difficult and increase operative time.    Tourniquet Time: none  EBL: 150cc  Additional Findings: Severe LFC DJD with posterio/lat instability   Releases none    Stanislav Rainey was brought to the operating room and positioned on the operating table.  He was anethestized  IV antibiotics were administered per CMS protocol. Prior to the incision being made a timeout was called identifying the patient, procedure ,operative side and surgeon.The right leg was prepped and draped in the usual sterile manner  An anterior longitudinal incision was accomplished just medial to the tibial tubercle and extending approximal 6 centimeters proximal to the superior pole of the patella.  A medial parapatellar capsular incision was performed. The medial capsular flap was elevated around to the insertion of the semimembranous tendon.  The patella was everted and the knee flexed and externally rotated.  The medial and lateral menisci were excised.  The lateral half of the fat pad excised and the patella femoral ligament was released.  The  Special Stains Stage 11 - Results: Base On Clearance Noted Above

## 2025-07-11 NOTE — ANESTHESIA PROCEDURE NOTES
Spinal Block    Patient location during procedure: OR  End time: 7/11/2025 12:46 PM  Reason for block: primary anesthetic  Staffing  Performed: anesthesiologist   Anesthesiologist: Evan Elias MD  Performed by: Evan Elias MD  Authorized by: Evan Elias MD    Spinal Block  Patient position: sitting  Prep: ChloraPrep  Patient monitoring: cardiac monitor, continuous pulse ox and frequent blood pressure checks  Approach: midline  Location: L3/L4  Provider prep: mask and sterile gloves  Local infiltration: lidocaine  Needle  Needle type: pencil-tip   Needle gauge: 25 G  Needle length: 3.5 in  Assessment  Events: SAB placement uncomplicated.  No paresthesia.  Swirl obtained: Yes  CSF: clear  Attempts: 1  Hemodynamics: stable  Additional Notes  Risks discussed including damage to muscle or nerve.  Preanesthetic Checklist  Completed: patient identified, IV checked, risks and benefits discussed, surgical/procedural consents, equipment checked, pre-op evaluation, timeout performed, anesthesia consent given, oxygen available and monitors applied/VS acknowledged

## 2025-07-11 NOTE — CARE COORDINATION
Pt is ain 79 yr old male s/p right TKA. He lives alone and support of 4 sons and a nurse friend. Pt's spouse  this past winter. Pt expressed wishes to go to [facility] acute rehab as his wife had a prior positive stay there. Referral sent for their review.  Therapy evals may not occur until Sat am.  Mao with [facility] aware. IF [facility] is full then he may consider [facility]. Will follow     25 1630   Service Assessment   Patient Orientation Alert and Oriented   Cognition Alert   History Provided By Patient   Primary Caregiver Self   Services At/After Discharge   Transition of Care Consult (CM Consult) Acute Rehab   Services At/After Discharge Inpatient rehab   Mode of Transport at Discharge BLS   Confirm Follow Up Transport Self   Condition of Participation: Discharge Planning   The Plan for Transition of Care is related to the following treatment goals: improve mobility   The Patient and/or Patient Representative was provided with a Choice of Provider? Patient   The Patient and/Or Patient Representative agree with the Discharge Plan? Yes   Freedom of Choice list was provided with basic dialogue that supports the patient's individualized plan of care/goals, treatment preferences, and shares the quality data associated with the providers?  Yes        Gen: NAD, +hiccups  Chest: s1s2, no murmurs appreciated, lungs CTA B/L  Abd: +obese, softly distended, nttp, no guarding  : nml external male circumsized genitalia, no blood at meatus, no sores or lesions appreciated, testicles with normal lie B/L

## 2025-07-11 NOTE — INTERVAL H&P NOTE
Update History & Physical    The patient's History and Physical of July 7, 2025 was reviewed with the patient and I examined the patient. There was no change. The surgical site was confirmed by the patient and me.     Plan: The risks, benefits, expected outcome, and alternative to the recommended procedure have been discussed with the patient. Patient understands and wants to proceed with the procedure.     Electronically signed by JIMENEZ SORIANO MD on 7/11/2025 at 1:01 PM

## 2025-07-11 NOTE — ANESTHESIA PRE PROCEDURE
Comments)     DUST  POLLENS  MOLD SPORES  Rhinitis     • Molds & Smuts Itching and Other (See Comments)     Rhinitis         Problem List:    Patient Active Problem List   Diagnosis Code   • Benign prostatic hyperplasia with lower urinary tract symptoms N40.1   • Allergic rhinitis J30.9   • SNHL (sensorineural hearing loss) H90.5   • Mixed hearing loss H90.8   • Cerebrovascular disease I67.9   • Bilateral carotid artery stenosis I65.23   • Ulcerative colitis without complications, unspecified location ([facility]) K51.90   • Genital pruritus L29.3   • Peripheral polyneuropathy G62.9   • Osteoarthritis of right knee M17.11   • Current severe episode of major depressive disorder without psychotic features ([facility]) F32.2   • Arthritis of right knee M17.11       Past Medical History:        Diagnosis Date   • Acute pancreatitis 8/17/2015   • Allergic rhinitis 12/6/2015   • Arthritis     OA   • Backache, unspecified 8/17/2015   • Benign neoplasm of colon 08/17/2015    hx   • Benign neoplasm of other specified sites of skin 8/17/2015   • Benign non-nodular prostatic hyperplasia with lower urinary tract symptoms 8/17/2015   • Cervical arthritis 5/30/2017   • Chronic sinusitis 8/17/2015   • Depressive disorder, not elsewhere classified 8/17/2015    managed with medication   • Deviated nasal septum 9/20/2016   • Esophageal reflux 8/17/2015   • Family history of sudden cardiac death    • GERD (gastroesophageal reflux disease)     controlled w/med   • History of COVID-19 03/2020    \"flu like symptoms\", HA   • Hypertrophy of prostate with urinary obstruction and other lower urinary tract symptoms (LUTS) 08/17/2015    managed with medication, followed by PCP Dr. Hartmann    • Mixed hearing loss 09/20/2016    bilateral hearing aids    • Osteoarthritis of multiple joints 8/22/2018   • Other and unspecified noninfectious gastroenteritis and colitis(558.9) 08/17/2015   • Other malaise and fatigue 8/17/2015   • Other urethral bulbous stricture,

## 2025-07-11 NOTE — ANESTHESIA POSTPROCEDURE EVALUATION
Department of Anesthesiology  Postprocedure Note    Patient: Stanislav Rainey  MRN: [id number]  YOB: 1945  Date of evaluation: 7/11/2025    Procedure Summary       Date: 07/11/25 [place] / [place] [facility]  / [facility]    Anesthesia Start: 1236 Anesthesia Stop: 1436    Procedure: *POD 1* KNEE TOTAL ARTHROPLASTY ROBOTIC VELYS-RIGHT (Right: Knee) Diagnosis:       Osteoarthritis of right knee      (Osteoarthritis of right knee [M17.11])    Surgeons: Catracho Treadwell MD Responsible Provider: Evan Elias MD    Anesthesia Type: spinal ASA Status: 3            Anesthesia Type: No value filed.    Bob Phase I: Bob Score: 9    Bob Phase II:      Anesthesia Post Evaluation    Patient location during evaluation: PACU  Patient participation: complete - patient participated  Level of consciousness: awake and alert  Airway patency: patent  Nausea & Vomiting: no nausea and no vomiting  Cardiovascular status: hemodynamically stable  Respiratory status: acceptable, nonlabored ventilation and spontaneous ventilation  Hydration status: euvolemic  Comments: BP (!) 154/73   Pulse 57   Temp 97 °F (36.1 °C) (Temporal)   Resp 16   Ht 1.727 m (5' 8\")   Wt 95 kg (209 lb 8 oz)   SpO2 93%   BMI 31.85 kg/m²     Multimodal analgesia pain management approach  Pain management: adequate and satisfactory to patient    No notable events documented.

## 2025-07-11 NOTE — ANESTHESIA PROCEDURE NOTES
Peripheral Block    Patient location during procedure: pre-op  Reason for block: post-op pain management and at surgeon's request  Start time: 7/11/2025 11:40 AM  End time: 7/11/2025 11:42 AM  Staffing  Performed: anesthesiologist   Anesthesiologist: Evan Elias MD  Performed by: Evan Elias MD  Authorized by: Evan Elias MD    Preanesthetic Checklist  Completed: patient identified, IV checked, site marked, risks and benefits discussed, surgical/procedural consents, equipment checked, pre-op evaluation, timeout performed, anesthesia consent given, oxygen available and monitors applied/VS acknowledged  Peripheral Block   Patient position: supine  Prep: ChloraPrep  Provider prep: mask and sterile gloves  Patient monitoring: cardiac monitor, continuous pulse ox, frequent blood pressure checks, IV access, oxygen and responsive to questions  Block type: Femoral  Adductor canal  Laterality: right  Injection technique: single-shot  Guidance: ultrasound guided  Local infiltration: lidocaine  Infiltration strength: 1 %  Local infiltration: lidocaine  Dose: 3 mL    Needle   Needle type: insulated echogenic nerve stimulator needle   Needle gauge: 20 G  Needle localization: ultrasound guidance  Needle length: 10 cm  Assessment   Injection assessment: negative aspiration for heme, no paresthesia on injection, no intravascular symptoms and local visualized surrounding nerve on ultrasound  Slow fractionated injection: yes  Hemodynamics: stable  Outcomes: patient tolerated procedure well    Additional Notes  Risks/benefits/alternatives discussed including damage to nerve or muscle.  Needle inserted and placed in close proximity to the nerve under real time ultrasound guidance.  Ultrasound was used to visualize the spread of local anesthetic in close proximity to the nerve being blocked.  The nerve appeared anatomically normal and there were no abnormal findings. A permanent ultrasound image is stored in the

## 2025-07-11 NOTE — CONSULTS
[facility] Hospitalist Consult   Admit Date:  2025  9:12 AM   Name:  Stanislav Rainey   Age:  79 y.o.  Sex:  male  :  1945   MRN:  [id number]   Room:  [facility]/    Presenting Complaint: No chief complaint on file.    Reason(s) for Admission: Osteoarthritis of right knee [M17.11]  Arthritis of right knee [M17.11]     Hospitalists consulted by Catracho Treadwell MD for: MM    History of Presenting Illness:   Stanislav Rainey is a 79 y.o. male with history of anxiety, depression, hypertension, hyperlipidemia, pancreatitis, GERD, TIA, ulcerative colitis, osteoarthritis; who underwent a right total knee arthroplasty today with Dr. Treadwell.  Patient seemed to tolerate procedure.  Orthopedics is asked hospitalist to see in consultation just for medical management.    Upon my examination, patient is resting.  No acute concerns.  Family at bedside. Not requiring O2. Hearing aids in.     We discussed his past medical history, medication list and diagnoses with current plan of care.  Plan is for patient to return home at time of discharge.  He will have labs in the morning and a therapy session.  Patient denies any nausea, vomiting, diarrhea, chest pain, abdominal pain, fever/chills.  No headache or blurry vision at this time.  All questions were answered at the bedside.  See plan below.    Assessment & Plan:     Principal Problem:    Osteoarthritis of right knee  Plan: Pt here for a rt TKA w Dr Treadwell 25;  cc  I reviewed pre op labs on  &   BMP ordered for tomorrow am  I defer orders for PT/OT, antibx of choice, pain meds, DVT ppx, dressing changes / nursing care to surgical site; to the ortho team  I defer DC planning to ortho and case mgmt    Active Problems:    Primary hypertension  Plan: /73 postoperatively  Pt may continue on home med; Cozaar 50 mg daily  I will add an order for IV hydralazine with parameters if needed  Monitor      HLD (hyperlipidemia)  Plan: Last

## 2025-07-11 NOTE — PERIOP NOTE
TRANSFER - OUT REPORT:    Verbal report given to MARIMAR Parisi on Stanislav Rainey  being transferred to Room 332 for routine progression of patient care       Report consisted of patient’s Situation, Background, Assessment and   Recommendations(SBAR).     Information from the following report(s) Nurse Handoff Report, Index, Adult Overview, Surgery Report, Intake/Output, MAR, Cardiac Rhythm SR-SB , and Neuro Assessment was reviewed with the receiving nurse.    Lines:   Peripheral IV 07/11/25 Left;Posterior Hand (Active)   Site Assessment Clean, dry & intact 07/11/25 1436   Line Status Infusing 07/11/25 1436   Line Care Connections checked and tightened 07/11/25 1436   Phlebitis Assessment No symptoms 07/11/25 1436   Infiltration Assessment 0 07/11/25 1436   Alcohol Cap Used No 07/11/25 1436   Dressing Status Clean, dry & intact 07/11/25 1436   Dressing Type Transparent 07/11/25 1436   Dressing Intervention New 07/11/25 1039        Opportunity for questions and clarification was provided.      Patient transported with:   Tech    VTE prophylaxis orders have been written for Stanislav Rainey.

## 2025-07-11 NOTE — CARE COORDINATION
I met with pt in June at [facility]- He is interested in [facility] Acute rehab after surgery. Wife passed away this past year but she had a past positive experience at [facility]. I have already sent referral to Mao at [facility] so they would be aware of his late afternoon referral.

## 2025-07-11 NOTE — PERIOP NOTE
Patient and family made aware that his surgery time maybe a little later (according to the patient before him just going back to block) Call light in reach. Son & caregiver at bedside.

## 2025-07-12 LAB
ANION GAP SERPL CALC-SCNC: 11 MMOL/L (ref 7–16)
BUN SERPL-MCNC: 20 MG/DL (ref 8–23)
CALCIUM SERPL-MCNC: 8.5 MG/DL (ref 8.8–10.2)
CHLORIDE SERPL-SCNC: 104 MMOL/L (ref 98–107)
CO2 SERPL-SCNC: 22 MMOL/L (ref 20–29)
CREAT SERPL-MCNC: 1.07 MG/DL (ref 0.8–1.3)
GLUCOSE SERPL-MCNC: 222 MG/DL (ref 70–99)
POTASSIUM SERPL-SCNC: 4.6 MMOL/L (ref 3.5–5.1)
SODIUM SERPL-SCNC: 137 MMOL/L (ref 136–145)

## 2025-07-12 PROCEDURE — 80048 BASIC METABOLIC PNL TOTAL CA: CPT

## 2025-07-12 PROCEDURE — 97165 OT EVAL LOW COMPLEX 30 MIN: CPT

## 2025-07-12 PROCEDURE — 6360000002 HC RX W HCPCS: Performed by: PHYSICIAN ASSISTANT

## 2025-07-12 PROCEDURE — 2500000003 HC RX 250 WO HCPCS: Performed by: PHYSICIAN ASSISTANT

## 2025-07-12 PROCEDURE — 6370000000 HC RX 637 (ALT 250 FOR IP): Performed by: PHYSICIAN ASSISTANT

## 2025-07-12 PROCEDURE — 36415 COLL VENOUS BLD VENIPUNCTURE: CPT

## 2025-07-12 PROCEDURE — 97530 THERAPEUTIC ACTIVITIES: CPT

## 2025-07-12 PROCEDURE — 97535 SELF CARE MNGMENT TRAINING: CPT

## 2025-07-12 RX ADMIN — SENNOSIDES, DOCUSATE SODIUM 1 TABLET: 50; 8.6 TABLET, FILM COATED ORAL at 20:59

## 2025-07-12 RX ADMIN — ASPIRIN 81 MG: 81 TABLET, COATED ORAL at 09:55

## 2025-07-12 RX ADMIN — SENNOSIDES, DOCUSATE SODIUM 1 TABLET: 50; 8.6 TABLET, FILM COATED ORAL at 09:55

## 2025-07-12 RX ADMIN — OXYCODONE 5 MG: 5 TABLET ORAL at 09:55

## 2025-07-12 RX ADMIN — ASPIRIN 81 MG: 81 TABLET, COATED ORAL at 20:59

## 2025-07-12 RX ADMIN — PANTOPRAZOLE SODIUM 40 MG: 40 TABLET, DELAYED RELEASE ORAL at 09:55

## 2025-07-12 RX ADMIN — LOSARTAN POTASSIUM 50 MG: 50 TABLET, FILM COATED ORAL at 09:55

## 2025-07-12 RX ADMIN — GABAPENTIN 100 MG: 100 CAPSULE ORAL at 20:59

## 2025-07-12 RX ADMIN — OXYCODONE 5 MG: 5 TABLET ORAL at 02:13

## 2025-07-12 RX ADMIN — METHOCARBAMOL 750 MG: 750 TABLET ORAL at 21:02

## 2025-07-12 RX ADMIN — ATORVASTATIN CALCIUM 20 MG: 10 TABLET, FILM COATED ORAL at 09:55

## 2025-07-12 RX ADMIN — ESCITALOPRAM OXALATE 10 MG: 10 TABLET ORAL at 09:55

## 2025-07-12 RX ADMIN — ACETAMINOPHEN 650 MG: 325 TABLET, FILM COATED ORAL at 05:49

## 2025-07-12 RX ADMIN — CEFAZOLIN 2000 MG: 10 INJECTION, POWDER, FOR SOLUTION INTRAVENOUS at 05:49

## 2025-07-12 ASSESSMENT — PAIN DESCRIPTION - ORIENTATION
ORIENTATION: RIGHT

## 2025-07-12 ASSESSMENT — KOOS JR
RISING FROM SITTING: MILD
BENDING TO THE FLOOR TO PICK UP OBJECT: MILD
KOOS JR TOTAL INTERVAL SCORE: 68.284
TWISING OR PIVOTING ON KNEE: MILD
STRAIGHTENING KNEE FULLY: MILD
HOW SEVERE IS YOUR KNEE STIFFNESS AFTER FIRST WAKING IN MORNING: MILD
GOING UP OR DOWN STAIRS: MILD
STANDING UPRIGHT: MILD

## 2025-07-12 ASSESSMENT — PAIN DESCRIPTION - DESCRIPTORS
DESCRIPTORS: ACHING
DESCRIPTORS: ACHING

## 2025-07-12 ASSESSMENT — PAIN SCALES - GENERAL
PAINLEVEL_OUTOF10: 2
PAINLEVEL_OUTOF10: 3
PAINLEVEL_OUTOF10: 1
PAINLEVEL_OUTOF10: 1
PAINLEVEL_OUTOF10: 0
PAINLEVEL_OUTOF10: 3

## 2025-07-12 ASSESSMENT — PAIN DESCRIPTION - LOCATION
LOCATION: KNEE

## 2025-07-12 ASSESSMENT — PAIN SCALES - WONG BAKER: WONGBAKER_NUMERICALRESPONSE: HURTS A LITTLE BIT

## 2025-07-12 NOTE — CARE COORDINATION
RUFINO note:    Update note: Jah has confirmed that their MD has accepted pt for rehab. They have no beds available today.    Initial note: Chart reviewed for updates. RUFINO sent updated therapy notes to [facility] rehab this morning. Jah the liaison with [facility] report that their MD is reviewing the referral. Jah will call with disposition. RUFINO will continue to follow up with d/c planning.    DONIS Agarwal

## 2025-07-13 VITALS
RESPIRATION RATE: 18 BRPM | BODY MASS INDEX: 31.75 KG/M2 | HEART RATE: 62 BPM | DIASTOLIC BLOOD PRESSURE: 66 MMHG | WEIGHT: 209.5 LBS | HEIGHT: 68 IN | TEMPERATURE: 97.3 F | OXYGEN SATURATION: 95 % | SYSTOLIC BLOOD PRESSURE: 129 MMHG

## 2025-07-13 LAB
BASOPHILS # BLD: 0.04 K/UL (ref 0–0.2)
BASOPHILS NFR BLD: 0.3 % (ref 0–2)
DIFFERENTIAL METHOD BLD: ABNORMAL
EOSINOPHIL # BLD: 0.08 K/UL (ref 0–0.8)
EOSINOPHIL NFR BLD: 0.7 % (ref 0.5–7.8)
ERYTHROCYTE [DISTWIDTH] IN BLOOD BY AUTOMATED COUNT: 13.4 % (ref 11.9–14.6)
HCT VFR BLD AUTO: 37.6 % (ref 41.1–50.3)
HGB BLD-MCNC: 12.3 G/DL (ref 13.6–17.2)
IMM GRANULOCYTES # BLD AUTO: 0.04 K/UL (ref 0–0.5)
IMM GRANULOCYTES NFR BLD AUTO: 0.3 % (ref 0–5)
LYMPHOCYTES # BLD: 2.23 K/UL (ref 0.5–4.6)
LYMPHOCYTES NFR BLD: 19.4 % (ref 13–44)
MCH RBC QN AUTO: 33.9 PG (ref 26.1–32.9)
MCHC RBC AUTO-ENTMCNC: 32.7 G/DL (ref 31.4–35)
MCV RBC AUTO: 103.6 FL (ref 82–102)
MONOCYTES # BLD: 0.86 K/UL (ref 0.1–1.3)
MONOCYTES NFR BLD: 7.5 % (ref 4–12)
NEUTS SEG # BLD: 8.24 K/UL (ref 1.7–8.2)
NEUTS SEG NFR BLD: 71.8 % (ref 43–78)
NRBC # BLD: 0 K/UL (ref 0–0.2)
PLATELET # BLD AUTO: 228 K/UL (ref 150–450)
PMV BLD AUTO: 9.8 FL (ref 9.4–12.3)
RBC # BLD AUTO: 3.63 M/UL (ref 4.23–5.6)
WBC # BLD AUTO: 11.5 K/UL (ref 4.3–11.1)

## 2025-07-13 PROCEDURE — 85025 COMPLETE CBC W/AUTO DIFF WBC: CPT

## 2025-07-13 PROCEDURE — 36415 COLL VENOUS BLD VENIPUNCTURE: CPT

## 2025-07-13 PROCEDURE — 97530 THERAPEUTIC ACTIVITIES: CPT

## 2025-07-13 PROCEDURE — 6370000000 HC RX 637 (ALT 250 FOR IP): Performed by: PHYSICIAN ASSISTANT

## 2025-07-13 RX ADMIN — SENNOSIDES, DOCUSATE SODIUM 1 TABLET: 50; 8.6 TABLET, FILM COATED ORAL at 09:50

## 2025-07-13 RX ADMIN — ATORVASTATIN CALCIUM 20 MG: 10 TABLET, FILM COATED ORAL at 09:50

## 2025-07-13 RX ADMIN — OXYCODONE 10 MG: 5 TABLET ORAL at 06:03

## 2025-07-13 RX ADMIN — OXYCODONE 10 MG: 5 TABLET ORAL at 00:58

## 2025-07-13 RX ADMIN — ACETAMINOPHEN 650 MG: 325 TABLET, FILM COATED ORAL at 06:02

## 2025-07-13 RX ADMIN — LOSARTAN POTASSIUM 50 MG: 50 TABLET, FILM COATED ORAL at 09:50

## 2025-07-13 RX ADMIN — ESCITALOPRAM OXALATE 10 MG: 10 TABLET ORAL at 09:50

## 2025-07-13 RX ADMIN — PANTOPRAZOLE SODIUM 40 MG: 40 TABLET, DELAYED RELEASE ORAL at 09:50

## 2025-07-13 RX ADMIN — OXYCODONE 10 MG: 5 TABLET ORAL at 09:50

## 2025-07-13 RX ADMIN — ASPIRIN 81 MG: 81 TABLET, COATED ORAL at 09:50

## 2025-07-13 RX ADMIN — ACETAMINOPHEN 650 MG: 325 TABLET, FILM COATED ORAL at 00:58

## 2025-07-13 ASSESSMENT — PAIN SCALES - GENERAL
PAINLEVEL_OUTOF10: 4
PAINLEVEL_OUTOF10: 3
PAINLEVEL_OUTOF10: 4

## 2025-07-13 ASSESSMENT — PAIN DESCRIPTION - ORIENTATION
ORIENTATION: RIGHT
ORIENTATION: RIGHT

## 2025-07-13 ASSESSMENT — PAIN DESCRIPTION - LOCATION
LOCATION: KNEE
LOCATION: KNEE

## 2025-07-13 ASSESSMENT — PAIN DESCRIPTION - DESCRIPTORS: DESCRIPTORS: ACHING

## 2025-07-13 NOTE — CARE COORDINATION
CM note:    Chart reviewed for updates. Pt is discharging today. Jah from [facility] has confirmed that pt has a bed available today. Pt is going to bed 202 and number to call report is [phone]. CM met with pt at bedside and gave all updates. Pt is agreeable with discharge today. COOPER was given upon admission. Transport scheduled for 1:30 pm. Assigned RN notified to call report. Discharge packet completed. No further CM needs.     07/13/25 1049   Services At/After Discharge   Transition of Care Consult (CM Consult) Acute Rehab   Services At/After Discharge Inpatient rehab    Resource Information Provided? No   Mode of Transport at Discharge [facility] Transport Time of Discharge 1330   Confirm Follow Up Transport Other (see comment)  ([facility] transport)   Condition of Participation: Discharge Planning   The Plan for Transition of Care is related to the following treatment goals: Pt is discharging to [facility] rehab   The Patient and/or Patient Representative was provided with a Choice of Provider? Patient   The Patient and/Or Patient Representative agree with the Discharge Plan? Yes   Freedom of Choice list was provided with basic dialogue that supports the patient's individualized plan of care/goals, treatment preferences, and shares the quality data associated with the providers?  Yes     DONIS Agarwal

## 2025-07-13 NOTE — DISCHARGE SUMMARY
[facility]  Total Joint Discharge Summary      Patient ID:  Stanislav Rainey  [id number]  79 y.o.  1945    Admit date: 7/11/2025  Discharge date and time: 7/12/25   Admitting Physician: Catracho Treadwell MD  Surgeon: Same  Admission Diagnoses: Osteoarthritis of right knee [M17.11]  Arthritis of right knee [M17.11]  Discharge Diagnoses: Principal Problem:    Osteoarthritis of right knee  Active Problems:    Arthritis of right knee  Resolved Problems:    * No resolved hospital problems. *                              Perioperative Antibiotics: Ancef 1 to 3 g was given depending on patient's weight. If allergic to Ancef or due to other indications, patient was given Vancomycin/Gent per protocol      Hospital Medications given:   acetaminophen, 1,000 mg, Once  celecoxib, 100 mg, Once  sodium chloride flush, 5-40 mL, 2 times per day  ceFAZolin (ANCEF) IV, 2,000 mg, On Call to OR      lactated ringers, Last Rate: 125 mL/hr at 07/11/25 1027  sodium chloride      lidocaine 1 % injection, 1 mL, Once PRN  fentanNYL, 100 mcg, Once PRN  sodium chloride flush, 5-40 mL, PRN  sodium chloride, , PRN  midazolam, 2 mg, Once PRN        Discharge Medications given:  Current Discharge Medication List        CONTINUE these medications which have CHANGED    Details   oxyCODONE (ROXICODONE) 5 MG immediate release tablet Take 1-2 tablets by mouth every 4 hours as needed for Pain for up to 7 days. Max Daily Amount: 60 mg  Qty: 60 tablet, Refills: 0    Comments: Reduce doses taken as pain becomes manageable  Associated Diagnoses: Primary osteoarthritis of right knee           CONTINUE these medications which have NOT CHANGED    Details   escitalopram (LEXAPRO) 10 MG tablet Take 1 tablet by mouth daily  Qty: 90 tablet, Refills: 3      losartan (COZAAR) 50 MG tablet Take 1 tablet by mouth daily  Qty: 90 tablet, Refills: 3    Associated Diagnoses: Primary hypertension      citalopram (CELEXA) 10 MG tablet Take 1 
Graham Mitchell MD Internal Medicine Schedule an appointment as soon as possible for a visit in 1 week(s)  [address] [place] [address]  [phone]      Alex Thakkar MD Orthopedic Surgery Schedule an appointment as soon as possible for a visit in 1 week(s)  [address] [place] [address]  [phone]              Future Appointments         Provider Specialty Dept Phone    7/22/2025 1:30 PM Sherrill Viera APRN - CNP Urology [phone]    8/13/2025 8:00 AM Roseline Guaman APRN - NP Internal Medicine [phone]    8/15/2025 9:45 AM (Arrive by 9:30 AM) Catracho Treadwell MD Orthopedic Surgery [phone]    1/26/2026 1:25 PM (Arrive by 1:10 PM) Catracho Treadwell MD Orthopedic Surgery [phone]              Follow up labs/diagnostics (ultimately defer to outpatient provider):  Defer to Follow-up Provider    Plan was discussed with Patient.  All questions answered.  Patient was stable at time of discharge.  Instructions given to call a physician or return if any concerns.        Current Discharge Medication List        CONTINUE these medications which have CHANGED    Details   oxyCODONE (ROXICODONE) 5 MG immediate release tablet Take 1-2 tablets by mouth every 4 hours as needed for Pain for up to 7 days. Max Daily Amount: 60 mg  Qty: 60 tablet, Refills: 0    Comments: Reduce doses taken as pain becomes manageable  Associated Diagnoses: Primary osteoarthritis of right knee           CONTINUE these medications which have NOT CHANGED    Details   escitalopram (LEXAPRO) 10 MG tablet Take 1 tablet by mouth daily  Qty: 90 tablet, Refills: 3      losartan (COZAAR) 50 MG tablet Take 1 tablet by mouth daily  Qty: 90 tablet, Refills: 3    Associated Diagnoses: Primary hypertension      citalopram (CELEXA) 10 MG tablet Take 1 tablet by mouth daily  Qty: 90 tablet, Refills: 3      atorvastatin (LIPITOR) 20 MG tablet Take 1 tablet by mouth daily  Qty: 90 tablet, Refills: 3    Associated

## 2025-07-13 NOTE — PROGRESS NOTES
Hospitalist Progress Note   Admit Date:  2025  9:12 AM   Name:  Stanislav Rainey   Age:  79 y.o.  Sex:  male  :  1945   MRN:  [id number]   Room:  [facility]/    Presenting Complaint: No chief complaint on file.     Reason(s) for Admission: Osteoarthritis of right knee [M17.11]  Arthritis of right knee [M17.11]     Hospital Course:   Stanislav Rainey is a 79 y.o. male with medical history of anxiety, depression, hypertension, hyperlipidemia, pancreatitis, GERD, TIA, ulcerative colitis, osteoarthritis; who underwent a right total knee arthroplasty today with Dr. Treadwell.  Patient seemed to tolerate procedure.  Orthopedics is asked hospitalist to see in consultation just for medical management.     Upon my examination, patient is resting.  No acute concerns.  Family at bedside. Not requiring O2. Hearing aids in.      We discussed his past medical history, medication list and diagnoses with current plan of care.  Plan is for patient to return home at time of discharge.  He will have labs in the morning and a therapy session.  Patient denies any nausea, vomiting, diarrhea, chest pain, abdominal pain, fever/chills.  No headache or blurry vision at this time.  All questions were answered at the bedside.  See plan below.    Subjective & 24hr Events (25):   Patient dressed and sitting up in recliner chair, has worked with therapy.  Afebrile.  Not requiring oxygen.  Blood pressure at baseline.  Reviewed morning labs with patient; sodium 137, potassium 4.6, creatinine 1.0  Patient has no overnight concerns/complaints  Denies nausea, vomiting, diarrhea, fever/chills, abdominal or chest pain  Patient is hoping to get [facility] with [facility] instead of discharging home; case management aware and is working on this    Medically, patient is cleared to discharge when Ortho decides to do so.  All questions were answered at the bedside.    Assessment & Plan:   Principal Problem:    Osteoarthritis 
ACUTE PHYSICAL THERAPY GOALS:   (Developed with and agreed upon by patient and/or caregiver.)  GOALS (1-4 days):    (2.)Mr. Rainey will transfer from bed to chair and chair to bed with STAND BY ASSIST using the least restrictive device.Met 7/12  (3.)Mr. Rainey will ambulate with STAND BY ASSIST for 200 feet with the least restrictive device. Met 7/12  (5.)Mr. Rainey will increase right knee ROM to 0-90°. Met 7/11    ADDENDUM GOALS :  (1.)Mr. Rainey will move from supine to sit and sit to supine  in bed with SUPERVISION.  2) Transfers with a walker & supervision.  3) pt ambulating 300 ft with rolling walker & SUPERVISION.  (4.)Mr. Rainey will ambulate up/down 6 steps with bilateral  railing with CONTACT GUARD ASSIST.    ________________________________________________________________________________________________           PHYSICAL THERAPY: TOTAL KNEE ARTHROPLASTY Re-evaluation and PM  (Link to Caseload Tracking: PT Visit Days : 2  Acknowledge Orders  Time In/Out  PT Charge Capture  Rehab Caseload Tracker  Episode   Stanislav Rainey is a 79 y.o. male   PRIMARY DIAGNOSIS: Osteoarthritis of right knee  Osteoarthritis of right knee [M17.11]  Arthritis of right knee [M17.11]  Procedure(s) (LRB):  *POD 1* KNEE TOTAL ARTHROPLASTY ROBOTIC VELYS-RIGHT (Right)  1 Day Post-Op  Reason for Referral: Pain in Right Knee (M25.561)  Stiffness of Right Knee, Not elsewhere classified (M25.661)  Difficulty in walking, Not elsewhere classified (R26.2)  Outpatient in a bed: Payor: MEDICARE / Plan: MEDICARE PART A AND B / Product Type: *No Product type* /     REHAB RECOMMENDATIONS:   Recommendation to date pending progress:  Setting:  Inpatient Rehab Facility  Pt requested [facility]    Equipment:    To Be Determined     RANGE OF MOTION:   Right Knee Flexion: R Knee Flexion (0-145): 112  Right Knee Extension: R Knee Extension (0): 0     GAIT: I Mod I S SBA CGA Min Mod Max Total  NT x2 Comments:   Level of Assistance [] [] [] 
ACUTE PHYSICAL THERAPY GOALS:   (Developed with and agreed upon by patient and/or caregiver.)  GOALS (1-4 days):  (1.)Mr. Rainey will move from supine to sit and sit to supine  in bed with SUPERVISION.  (2.)Mr. Rainey will transfer from bed to chair and chair to bed with STAND BY ASSIST using the least restrictive device.  (3.)Mr. Rainey will ambulate with STAND BY ASSIST for 200 feet with the least restrictive device.  (4.)Mr. Rainey will ambulate up/down 6 steps with bilateral  railing with CONTACT GUARD ASSIST.  (5.)Mr. Rainey will increase right knee ROM to 0-90°. Met 7/11  ________________________________________________________________________________________________           PHYSICAL THERAPY: TOTAL KNEE ARTHROPLASTY Daily Note and AM  (Link to Caseload Tracking: PT Visit Days : 2  Acknowledge Orders  Time In/Out  PT Charge Capture  Rehab Caseload Tracker  Episode   Stanislav Rainey is a 79 y.o. male   PRIMARY DIAGNOSIS: Osteoarthritis of right knee  Osteoarthritis of right knee [M17.11]  Arthritis of right knee [M17.11]  Procedure(s) (LRB):  *POD 1* KNEE TOTAL ARTHROPLASTY ROBOTIC VELYS-RIGHT (Right)  1 Day Post-Op  Reason for Referral: Pain in Right Knee (M25.561)  Stiffness of Right Knee, Not elsewhere classified (M25.661)  Difficulty in walking, Not elsewhere classified (R26.2)  Outpatient in a bed: Payor: MEDICARE / Plan: MEDICARE PART A AND B / Product Type: *No Product type* /     REHAB RECOMMENDATIONS:   Recommendation to date pending progress:  Setting:  Inpatient Rehab Facility  Pt requested [facility]    Equipment:    To Be Determined     RANGE OF MOTION:   Right Knee Flexion: R Knee Flexion (0-145): 112  Right Knee Extension: R Knee Extension (0): 0     GAIT: I Mod I S SBA CGA Min Mod Max Total  NT x2 Comments:   Level of Assistance [] [] [] [] [x] [] [] [] [] [] []            Weightbearing Status  Right Lower Extremity Weight Bearing: Weight Bearing As Tolerated    Distance  150 feet  
[facility] Progress Note    NAME: Stanislav Rainey  : 1945  MRN: [id number]  DATE: 2025  POD: 2 Days Post-Op  S/P: right total knee arthroplasty    SUBJECTIVE:  No acute events overnight. No new complaints this morning. Denies nausea/vomiting,  headache, chest pain or shortness of breath.    Recent Labs     25  0440      K 4.6      CO2 22   BUN 20           PHYSICAL EXAM:  Pt is AAOx3. No acute distress  right Lower Extremity  dressing clean, dry, and intact  Incision not visualized  Motor function intact EHL/TA, GS/PT/FHL  SILT s/s/sp/dp/t distributions  Palpable DP/PT pulses. Foot WWP    ASSESSMENT:  79 y.o.  male   s/p right TKA  doing well postoperatively    PLAN:  - Regular diet  - Hemodynamically stable  - Perioperative IV antibiotics x 24 hours  - Postoperative xrays reviewed and without complication  - WBAT RLE, mobilize with PT/OT  - DVT prophylaxis with ASA BID x 30 days and SCDs  - Dispo planning: Anticipate discharge to rehab pending bed approval      Alex Thakkar MD,2025, 7:44 AM    
Medication hold clearance found in EHR if needed for reference.  
[facility] Progress Note    NAME: Stanislav Rainey  : 1945  MRN: [id number]  DATE: 2025  POD: 1 Day Post-Op  S/P: right total knee arthroplasty    SUBJECTIVE:  No acute events overnight. No new complaints this morning. Denies nausea/vomiting,  headache, chest pain or shortness of breath.    Recent Labs     25  0440      K 4.6      CO2 22   BUN 20           PHYSICAL EXAM:  Pt is AAOx3. No acute distress  right Lower Extremity  dressing clean, dry, and intact  Incision not visualized  Motor function intact EHL/TA, GS/PT/FHL  SILT s/s/sp/dp/t distributions  Palpable DP/PT pulses. Foot WWP    ASSESSMENT:  79 y.o.  male   s/p right TKA  doing well postoperatively    PLAN:  - Regular diet  - Hemodynamically stable  - Perioperative IV antibiotics x 24 hours  - Postoperative xrays reviewed and without complication  - WBAT RLE, mobilize with PT/OT  - DVT prophylaxis with ASA BID x 30 days and SCDs  - Dispo planning: Anticipate discharge to rehab pending bed approval      Alex Thakkar MD,2025, 8:02 AM    
Assistance [] [] [] [x] [] [] [] [] [] [] []            Weightbearing Status  Right Lower Extremity Weight Bearing: Weight Bearing As Tolerated    Distance  265 feet    Gait Quality Antalgic, Decreased vito , Decreased step clearance, Decreased step length, and Decreased stance    DME Rolling Walker     Stairs Stairs/Curb  Stairs?: Yes  Stairs  # Steps : 6  Rails: Bilateral  Assistance: Contact guard assistance    Ramp N/A    I=Independent, Mod I=Modified Independent, S=Supervision, SBA=Standby Assistance, CGA=Contact Guard Assistance,   Min=Minimal Assistance, Mod=Moderate Assistance, Max=Maximal Assistance, Total=Total Assistance, NT=Not Tested    ASSESSMENT:   ASSESSMENT:  Mr. Rainey was eager to work with therapy & performed all exercises without difficulty. Pt reported some instability at his surgical knee during Wb'ing activity when up with nursing. Pt showed increased gait distance  & improved level of assist for transfers & gait. Mr Rainey is still a good candidate for an aggressive team approach available in an acute in-pt rehab setting. This environment will provide the level of rehab intensity this pt needs, with medical oversight, to allow him to reach his functional goals more efficiently. PT will follow up & prep pt for an acute in-pt rehab transfer.  PM session : Pt reviewed all TKA exercises without difficulty & Pt showed increased gait distance with improved level of assist for transfers & gait. Pt continues to demonstrate steady gains with therapy. Pt is expected to progress quickly in acute in-pt rehab.  Will continue POC.    7/13 : Pt continues to progress well with therapy with increased gait distance & improved level of assist for transfers & gait. Pt is expected to transfer to [facility] rehab this pm. Anticipate this pt to make major gains back to his baseline function post acute in-pt rehab.        Outcome Measure:   KOOS-JR:  KOOS, . Knee Survey Score: 7    SUBJECTIVE:   Mr. Rainey 
Decreased AROM/PROM, Decreased Gait Ability, Decreased Strength, Decreased Transfer Abilities, and Increased Pain   INTERVENTIONS PLANNED:   (Benefits and precautions of physical therapy have been discussed with the patient.)  Therapeutic Activity, Therapeutic Exercise/HEP, Gait Training, Modalities, and Education       TREATMENT:   EVALUATION: LOW COMPLEXITY: (Untimed Charge)  The initial evaluation charge encompasses clinical chart review, objective assessment, interpretation of assessment, and skilled monitoring of the patient's response to treatment in order to develop a plan of care.     TREATMENT:   Therapeutic Activity (28 Minutes): Therapeutic activity included reviewed PT role, POC & PT expectations for today's activity, pt performed TKA exercises, pt performed diagonal wt shift, reviewed the use of ice for pain & swelling, reviewed in house safety, progressive gait training to improve functional Activity tolerance, Balance, Coordination, Mobility, Strength, and ROM.    TREATMENT GRID:  THERAPEUTIC  EXERCISES: DATE:  7/11 DATE:   DATE:      AM PM AM PM AM PM    [] [] [] [] [] []   Ankle Pumps  20       Quad Sets  20       Gluteal Sets  20       Hip Abd/ADduction  20       Straight Leg Raises  20       Knee Slides  20       Short Arc Quads  20       Chair Slides  20                         B = bilateral; AA = active assistive; A = active; P = passive      Educated patient and/or family/caregiver on the following: Adaptive Equipment as Needed, Cryocuff/Icepacks, Fall Precautions, Home Exercises, No Pillow Under Knee (TKA), and Walker Management/Safety    Interdisciplinary Patient Education   (Reference education tab)    AFTER TREATMENT PRECAUTIONS: Bed/Chair Locked, Call light within reach, Chair, Needs within reach, RN notified, and Visitors at bedside    INTERDISCIPLINARY COLLABORATION:  RN/ PCT, OT/ OLIVEIRA, and RN Case Manager/      COMPLIANCE WITH PROGRAM/EXERCISE: Will assess as treatment 
minimal assist required to demonstrate improved functional mobility and safety. -GOAL MET 7/12/25   3.  Mr. Rainey will perform toileting activity with  contact guard assist to demonstrate improved functional mobility and safety. -GOAL MET 7/12/25   4.  Mr. Rainey will perform all functional transfers transfer with contact guard assist to demonstrate improved functional mobility and safety. -GOAL MET 7/12/25   New goals  1.  Mr. Rainey will perform lower body dressing activity with stand by assist required to demonstrate improved functional mobility and safety.     2.  Mr. Rainey will perform bathing activity with stand by assist required to demonstrate improved functional mobility and safety.  3.  Mr. Rainey will perform toileting activity with stand by assist to demonstrate improved functional mobility and safety.  4.  Mr. Rainey will perform all functional transfers transfer with stand by assist to demonstrate improved functional mobility and safety.  PROBLEM LIST:   (Skilled intervention is medically necessary to address:)  Decreased ADL/Functional Activities  Decreased Activity Tolerance  Decreased Balance  Decreased Coordination  Decreased Gait Ability  Decreased Safety Awareness  Decreased Strength  Decreased Transfer Abilities   INTERVENTIONS PLANNED:   (Benefits and precautions of occupational therapy have been discussed with the patient.)  Self Care Training  Therapeutic Activity  Education         TREATMENT:     EVALUATION: LOW COMPLEXITY: (Untimed Charge)    TREATMENT:   SELF CARE: (35 minutes):   Procedure(s) (per grid) utilized to improve and/or restore self-care/home management as related to dressing, bathing, toileting, grooming, self feeding, and functional mobility . Required minimal visual, verbal, manual, and tactile cueing to facilitate activities of daily living skills, compensatory activities, and .OT plan of care, discharge planning, adl task performance, post op showering, resting joint

## 2025-07-13 NOTE — PLAN OF CARE
Problem: Pain  Goal: Verbalizes/displays adequate comfort level or baseline comfort level  7/12/2025 2120 by Shelbie Ellis RN  Outcome: Progressing  7/12/2025 1014 by Ailin Santillan RN  Outcome: Progressing     Problem: Safety - Adult  Goal: Free from fall injury  7/12/2025 2120 by Shelbie Ellis RN  Outcome: Progressing  7/12/2025 1014 by Ailin Santillan RN  Outcome: Progressing     Problem: Discharge Planning  Goal: Discharge to home or other facility with appropriate resources  7/12/2025 2120 by Shelbie Ellis RN  Outcome: Progressing  7/12/2025 1014 by Ailin Santillan RN  Outcome: Progressing     Problem: ABCDS Injury Assessment  Goal: Absence of physical injury  7/12/2025 2120 by Shelbie Ellis RN  Outcome: Progressing  7/12/2025 1014 by Ailin Santillan RN  Outcome: Progressing

## 2025-07-23 ENCOUNTER — CARE COORDINATION (OUTPATIENT)
Dept: CARE COORDINATION | Facility: CLINIC | Age: 80
End: 2025-07-23

## 2025-07-23 ENCOUNTER — TELEPHONE (OUTPATIENT)
Dept: INTERNAL MEDICINE CLINIC | Facility: CLINIC | Age: 80
End: 2025-07-23

## 2025-07-23 NOTE — TELEPHONE ENCOUNTER
Pt sent a message requesting to schedule a lab appointment. He wants his PSA checked. Is this something that CMS will order so I can schedule him?        Call Back# [phone]

## 2025-07-23 NOTE — CARE COORDINATION
Care Transitions Note    Initial Call - Call within 2 business days of discharge: Yes    Patient Current Location:  Home: [address] [place] [id number]    Care Transition Nurse contacted the patient by telephone to perform post hospital discharge assessment, verified name and  as identifiers.  Provided introduction to self, and explanation of the Care Transition Nurse role.    Patient: Stanislav Rainey    Patient : 1945   MRN: [id number]    Reason for Admission: osteoarthritis, right total knee arthroplasty  Discharge Date: 25  RURS: No data recorded    Last Discharge Facility       Date Complaint Diagnosis Description Type Department Provider    25  Primary osteoarthritis of right knee Admission (Discharged) [id number] Catracho Treadwell MD            Was this an external facility discharge? No    Additional needs identified to be addressed with provider   No needs identified             Method of communication with provider: none.    Patients top risk factors for readmission: medical condition-recent knee arthroplasty    Interventions to address risk factors:   Review of patient management of conditions/medications: reviewed recent IP admission/surgery. Reviewed d/c from SNF. Patient contacting [facility] for outpatient PT referral. Patient has follow up on , 8/15     Care Summary Note: Patient is home after SNF d/c for knee arthroplasty. Patient indicated he has been recovering well and has followed instructions post op. Patient has pain controlled at this time with tramadol/acetaminophen, ice, activity. Patient indicated he has all DME needed at this time and is planning for OP PT. Patient has appt upcoming with providers as PNV for PCP and Orthopedics for post op follow up and declines earlier attempts to follow up. CTN encouraged contact for any questions/concerns that may arise.     Care Transition Nurse reviewed discharge instructions, medical action plan, and red flags

## 2025-07-24 ENCOUNTER — TELEPHONE (OUTPATIENT)
Dept: INTERNAL MEDICINE CLINIC | Facility: CLINIC | Age: 80
End: 2025-07-24

## 2025-07-24 DIAGNOSIS — N40.1 BENIGN PROSTATIC HYPERPLASIA WITH LOWER URINARY TRACT SYMPTOMS, SYMPTOM DETAILS UNSPECIFIED: Primary | ICD-10-CM

## 2025-07-24 NOTE — TELEPHONE ENCOUNTER
Spoke with pt and relayed the message from . States he will speak with his caregiver and call us back. (07/24)

## 2025-07-24 NOTE — TELEPHONE ENCOUNTER
Mr. Rainey's caregiver, Merced has called to see if the labs that Dr. Treadwell has ordered for him can be done while he is here in the office for his psa?      Protime/inr  Mrsa/staph  Hemoglobin A1c  Cmp  Cbc with auto diff     Please advise

## 2025-07-25 ENCOUNTER — TELEPHONE (OUTPATIENT)
Dept: INTERNAL MEDICINE CLINIC | Facility: CLINIC | Age: 80
End: 2025-07-25

## 2025-07-25 NOTE — TELEPHONE ENCOUNTER
Re: Citalopram HBR Tabs 10 MG and                Escitalopram Tabs 10 MG      Phone# [phone]  Fax#  [phone]    Placed in provider's box

## 2025-07-25 NOTE — TELEPHONE ENCOUNTER
Left msg for Merced to return my call  The pt is trying to fill lexapro and celexa- Duplicate in therpay    Need to know which medication the pt is taking

## 2025-07-29 ENCOUNTER — CARE COORDINATION (OUTPATIENT)
Dept: CARE COORDINATION | Facility: CLINIC | Age: 80
End: 2025-07-29

## 2025-07-29 DIAGNOSIS — M17.11 PRIMARY OSTEOARTHRITIS OF RIGHT KNEE: Primary | ICD-10-CM

## 2025-07-29 RX ORDER — CELECOXIB 200 MG/1
200 CAPSULE ORAL DAILY
Qty: 90 CAPSULE | Refills: 3 | Status: SHIPPED | OUTPATIENT
Start: 2025-07-29

## 2025-07-29 NOTE — CARE COORDINATION
Care Transitions Note    Final Call     [place] [address]    LPN Care Coordinator contacted the patient by telephone. Verified name and  as identifiers.    Patient graduated from the Care Transitions program on 2025.  Patient/family verbalizes confidence in the ability to self-manage at this time..      Advance Care Planning:   Does patient have an Advance Directive: reviewed and current.    Handoff:   Patient was not referred to the ACM team due to no additional needs identified.       Care Summary Note: Spoke with patient states doing fine. Patient denies any acute distress during this phone call. Patient states started Outpatient Therapy today. Patient states has a lot of equipment at home that has been helping with recovery. Patient states will follow up with post op on 8/15/2025. Patient states appreciative for follow up calls.    Assessments:  Care Transitions Subsequent and Final Call    Subsequent and Final Calls  Do you have any ongoing symptoms?: No  Have your medications changed?: No  Do you have any questions related to your medications?: No  Do you currently have any active services?: No  Do you have any needs or concerns that I can assist you with?: No  Identified Barriers: None  Care Transitions Interventions  No Identified Needs  Other Interventions:              Upcoming Appointments:    Future Appointments         Provider Specialty Dept Phone    8/15/2025 9:45 AM (Arrive by 9:30 AM) Catracho Treadwell MD Orthopedic Surgery [phone]    2025 9:00 AM [facility] Internal Medicine [phone]    10/23/2025 10:00 AM Roseline Guaman APRN - NP Internal Medicine [phone]    2026 1:25 PM (Arrive by 1:10 PM) Catracho Treadwell MD Orthopedic Surgery [phone]            Patient has agreed to contact primary care provider and/or specialist for any further questions, concerns, or needs.    Lilli Moran LPN

## 2025-08-06 ENCOUNTER — CARE COORDINATION (OUTPATIENT)
Dept: CARE COORDINATION | Facility: CLINIC | Age: 80
End: 2025-08-06

## 2025-08-06 ENCOUNTER — HOSPITAL ENCOUNTER (EMERGENCY)
Age: 80
Discharge: HOME | End: 2025-08-06
Attending: EMERGENCY MEDICINE
Payer: MEDICARE

## 2025-08-06 ENCOUNTER — APPOINTMENT (OUTPATIENT)
Dept: ULTRASOUND IMAGING | Age: 80
End: 2025-08-06
Payer: MEDICARE

## 2025-08-06 VITALS
TEMPERATURE: 98 F | DIASTOLIC BLOOD PRESSURE: 79 MMHG | WEIGHT: 208 LBS | SYSTOLIC BLOOD PRESSURE: 147 MMHG | BODY MASS INDEX: 31.52 KG/M2 | HEART RATE: 64 BPM | HEIGHT: 68 IN | RESPIRATION RATE: 18 BRPM | OXYGEN SATURATION: 97 %

## 2025-08-06 DIAGNOSIS — M79.604 PAIN OF RIGHT LOWER EXTREMITY: Primary | ICD-10-CM

## 2025-08-06 DIAGNOSIS — M17.11 PRIMARY OSTEOARTHRITIS OF RIGHT KNEE: Primary | ICD-10-CM

## 2025-08-06 DIAGNOSIS — R60.0 LEG EDEMA, RIGHT: ICD-10-CM

## 2025-08-06 LAB
ALBUMIN SERPL-MCNC: 3.5 G/DL (ref 3.2–4.6)
ALBUMIN/GLOB SERPL: 1.2 (ref 1–1.9)
ALP SERPL-CCNC: 112 U/L (ref 40–129)
ALT SERPL-CCNC: 13 U/L (ref 8–55)
ANION GAP SERPL CALC-SCNC: 12 MMOL/L (ref 7–16)
AST SERPL-CCNC: 23 U/L (ref 15–37)
BASOPHILS # BLD: 0.12 K/UL (ref 0–0.2)
BASOPHILS NFR BLD: 1.4 % (ref 0–2)
BILIRUB SERPL-MCNC: 0.4 MG/DL (ref 0–1.2)
BUN SERPL-MCNC: 13 MG/DL (ref 8–23)
CALCIUM SERPL-MCNC: 9.1 MG/DL (ref 8.8–10.2)
CHLORIDE SERPL-SCNC: 107 MMOL/L (ref 98–107)
CO2 SERPL-SCNC: 22 MMOL/L (ref 20–29)
CREAT SERPL-MCNC: 1.12 MG/DL (ref 0.8–1.3)
CRP SERPL HS-MCNC: 2.4 MG/L (ref 0–3)
CRP SERPL-MCNC: <0.3 MG/DL (ref 0–0.4)
DIFFERENTIAL METHOD BLD: ABNORMAL
EOSINOPHIL # BLD: 0.89 K/UL (ref 0–0.8)
EOSINOPHIL NFR BLD: 10.7 % (ref 0.5–7.8)
ERYTHROCYTE [DISTWIDTH] IN BLOOD BY AUTOMATED COUNT: 12.9 % (ref 11.9–14.6)
ERYTHROCYTE [SEDIMENTATION RATE] IN BLOOD: 11 MM/HR (ref 0–15)
GLOBULIN SER CALC-MCNC: 3 G/DL (ref 2.3–3.5)
GLUCOSE SERPL-MCNC: 108 MG/DL (ref 70–99)
HCT VFR BLD AUTO: 37.1 % (ref 41.1–50.3)
HGB BLD-MCNC: 12.2 G/DL (ref 13.6–17.2)
IMM GRANULOCYTES # BLD AUTO: 0.02 K/UL (ref 0–0.5)
IMM GRANULOCYTES NFR BLD AUTO: 0.2 % (ref 0–5)
LYMPHOCYTES # BLD: 1.91 K/UL (ref 0.5–4.6)
LYMPHOCYTES NFR BLD: 23 % (ref 13–44)
MCH RBC QN AUTO: 33.8 PG (ref 26.1–32.9)
MCHC RBC AUTO-ENTMCNC: 32.9 G/DL (ref 31.4–35)
MCV RBC AUTO: 102.8 FL (ref 82–102)
MONOCYTES # BLD: 0.96 K/UL (ref 0.1–1.3)
MONOCYTES NFR BLD: 11.6 % (ref 4–12)
NEUTS SEG # BLD: 4.41 K/UL (ref 1.7–8.2)
NEUTS SEG NFR BLD: 53.1 % (ref 43–78)
NRBC # BLD: 0 K/UL (ref 0–0.2)
PLATELET # BLD AUTO: 348 K/UL (ref 150–450)
PMV BLD AUTO: 9.2 FL (ref 9.4–12.3)
POTASSIUM SERPL-SCNC: 4.5 MMOL/L (ref 3.5–5.1)
PROT SERPL-MCNC: 6.4 G/DL (ref 6.3–8.2)
RBC # BLD AUTO: 3.61 M/UL (ref 4.23–5.6)
SODIUM SERPL-SCNC: 141 MMOL/L (ref 136–145)
WBC # BLD AUTO: 8.3 K/UL (ref 4.3–11.1)

## 2025-08-06 PROCEDURE — 86140 C-REACTIVE PROTEIN: CPT

## 2025-08-06 PROCEDURE — 6360000002 HC RX W HCPCS: Performed by: EMERGENCY MEDICINE

## 2025-08-06 PROCEDURE — 85652 RBC SED RATE AUTOMATED: CPT

## 2025-08-06 PROCEDURE — 99284 EMERGENCY DEPT VISIT MOD MDM: CPT

## 2025-08-06 PROCEDURE — 93971 EXTREMITY STUDY: CPT

## 2025-08-06 PROCEDURE — 85025 COMPLETE CBC W/AUTO DIFF WBC: CPT

## 2025-08-06 PROCEDURE — 86141 C-REACTIVE PROTEIN HS: CPT

## 2025-08-06 PROCEDURE — 96374 THER/PROPH/DIAG INJ IV PUSH: CPT

## 2025-08-06 PROCEDURE — 80053 COMPREHEN METABOLIC PANEL: CPT

## 2025-08-06 RX ORDER — OXYCODONE HYDROCHLORIDE 5 MG/1
5-10 TABLET ORAL EVERY 4 HOURS PRN
Qty: 60 TABLET | Refills: 0 | Status: SHIPPED | OUTPATIENT
Start: 2025-08-06 | End: 2025-08-11

## 2025-08-06 RX ORDER — MORPHINE SULFATE 4 MG/ML
4 INJECTION, SOLUTION INTRAMUSCULAR; INTRAVENOUS
Refills: 0 | Status: COMPLETED | OUTPATIENT
Start: 2025-08-06 | End: 2025-08-06

## 2025-08-06 RX ADMIN — MORPHINE SULFATE 4 MG: 4 INJECTION, SOLUTION INTRAMUSCULAR; INTRAVENOUS at 02:22

## 2025-08-06 ASSESSMENT — LIFESTYLE VARIABLES
HOW MANY STANDARD DRINKS CONTAINING ALCOHOL DO YOU HAVE ON A TYPICAL DAY: 1 OR 2
HOW OFTEN DO YOU HAVE A DRINK CONTAINING ALCOHOL: 4 OR MORE TIMES A WEEK

## 2025-08-06 ASSESSMENT — PAIN DESCRIPTION - ORIENTATION
ORIENTATION: RIGHT
ORIENTATION: RIGHT

## 2025-08-06 ASSESSMENT — PAIN SCALES - GENERAL
PAINLEVEL_OUTOF10: 6
PAINLEVEL_OUTOF10: 0
PAINLEVEL_OUTOF10: 6

## 2025-08-06 ASSESSMENT — PAIN DESCRIPTION - LOCATION
LOCATION: KNEE
LOCATION: KNEE

## 2025-08-06 ASSESSMENT — PAIN - FUNCTIONAL ASSESSMENT: PAIN_FUNCTIONAL_ASSESSMENT: 0-10

## 2025-08-11 ENCOUNTER — TELEPHONE (OUTPATIENT)
Dept: INTERNAL MEDICINE CLINIC | Facility: CLINIC | Age: 80
End: 2025-08-11

## 2025-08-20 ENCOUNTER — HOSPITAL ENCOUNTER (EMERGENCY)
Age: 80
Discharge: HOME | End: 2025-08-20
Payer: MEDICARE

## 2025-08-20 ENCOUNTER — TELEPHONE (OUTPATIENT)
Dept: ORTHOPEDIC SURGERY | Age: 80
End: 2025-08-20

## 2025-08-20 ENCOUNTER — APPOINTMENT (OUTPATIENT)
Dept: ULTRASOUND IMAGING | Age: 80
End: 2025-08-20
Payer: MEDICARE

## 2025-08-20 ENCOUNTER — APPOINTMENT (OUTPATIENT)
Dept: GENERAL RADIOLOGY | Age: 80
End: 2025-08-20
Payer: MEDICARE

## 2025-08-20 VITALS
TEMPERATURE: 98.3 F | BODY MASS INDEX: 29.78 KG/M2 | WEIGHT: 208 LBS | RESPIRATION RATE: 17 BRPM | DIASTOLIC BLOOD PRESSURE: 78 MMHG | HEIGHT: 70 IN | OXYGEN SATURATION: 97 % | HEART RATE: 58 BPM | SYSTOLIC BLOOD PRESSURE: 160 MMHG

## 2025-08-20 DIAGNOSIS — M51.361 DEGENERATION OF INTERVERTEBRAL DISC OF LUMBAR REGION WITH LOWER EXTREMITY PAIN: ICD-10-CM

## 2025-08-20 DIAGNOSIS — M54.31 SCIATICA OF RIGHT SIDE: Primary | ICD-10-CM

## 2025-08-20 DIAGNOSIS — L03.115 CELLULITIS OF KNEE, RIGHT: ICD-10-CM

## 2025-08-20 LAB
ALBUMIN SERPL-MCNC: 4 G/DL (ref 3.2–4.6)
ALBUMIN/GLOB SERPL: 1.2 (ref 1–1.9)
ALP SERPL-CCNC: 87 U/L (ref 40–129)
ALT SERPL-CCNC: 16 U/L (ref 8–55)
ANION GAP SERPL CALC-SCNC: 14 MMOL/L (ref 7–16)
APPEARANCE UR: CLEAR
AST SERPL-CCNC: 36 U/L (ref 15–37)
BACTERIA URNS QL MICRO: 0 /HPF
BASOPHILS # BLD: 0.1 K/UL (ref 0–0.2)
BASOPHILS NFR BLD: 1.1 % (ref 0–2)
BILIRUB SERPL-MCNC: 0.8 MG/DL (ref 0–1.2)
BILIRUB UR QL: NEGATIVE
BUN SERPL-MCNC: 21 MG/DL (ref 8–23)
CALCIUM SERPL-MCNC: 9.6 MG/DL (ref 8.8–10.2)
CHLORIDE SERPL-SCNC: 102 MMOL/L (ref 98–107)
CO2 SERPL-SCNC: 21 MMOL/L (ref 20–29)
COLOR UR: ABNORMAL
CREAT SERPL-MCNC: 1.16 MG/DL (ref 0.8–1.3)
CRP SERPL HS-MCNC: 10.8 MG/L (ref 0–3)
DIFFERENTIAL METHOD BLD: ABNORMAL
EKG ATRIAL RATE: 59 BPM
EKG DIAGNOSIS: NORMAL
EKG P AXIS: 37 DEGREES
EKG P-R INTERVAL: 167 MS
EKG Q-T INTERVAL: 456 MS
EKG QRS DURATION: 100 MS
EKG QTC CALCULATION (BAZETT): 460 MS
EKG R AXIS: 51 DEGREES
EKG T AXIS: 55 DEGREES
EKG VENTRICULAR RATE: 61 BPM
EOSINOPHIL # BLD: 0.6 K/UL (ref 0–0.8)
EOSINOPHIL NFR BLD: 6.3 % (ref 0.5–7.8)
EPI CELLS #/AREA URNS HPF: ABNORMAL /HPF
ERYTHROCYTE [DISTWIDTH] IN BLOOD BY AUTOMATED COUNT: 12.7 % (ref 11.9–14.6)
GLOBULIN SER CALC-MCNC: 3.4 G/DL (ref 2.3–3.5)
GLUCOSE SERPL-MCNC: 124 MG/DL (ref 70–99)
GLUCOSE UR STRIP.AUTO-MCNC: NEGATIVE MG/DL
HCT VFR BLD AUTO: 42.3 % (ref 41.1–50.3)
HGB BLD-MCNC: 14.8 G/DL (ref 13.6–17.2)
HGB UR QL STRIP: NEGATIVE
IMM GRANULOCYTES # BLD AUTO: 0.03 K/UL (ref 0–0.5)
IMM GRANULOCYTES NFR BLD AUTO: 0.3 % (ref 0–5)
KETONES UR QL STRIP.AUTO: ABNORMAL MG/DL
LACTATE SERPL-SCNC: 1.5 MMOL/L (ref 0.5–2)
LEUKOCYTE ESTERASE UR QL STRIP.AUTO: ABNORMAL
LYMPHOCYTES # BLD: 1.43 K/UL (ref 0.5–4.6)
LYMPHOCYTES NFR BLD: 15 % (ref 13–44)
MCH RBC QN AUTO: 35.2 PG (ref 26.1–32.9)
MCHC RBC AUTO-ENTMCNC: 35 G/DL (ref 31.4–35)
MCV RBC AUTO: 100.7 FL (ref 82–102)
MONOCYTES # BLD: 0.72 K/UL (ref 0.1–1.3)
MONOCYTES NFR BLD: 7.6 % (ref 4–12)
MUCOUS THREADS URNS QL MICRO: ABNORMAL /LPF
NEUTS SEG # BLD: 6.63 K/UL (ref 1.7–8.2)
NEUTS SEG NFR BLD: 69.7 % (ref 43–78)
NITRITE UR QL STRIP.AUTO: NEGATIVE
NRBC # BLD: 0 K/UL (ref 0–0.2)
OTHER OBSERVATIONS: ABNORMAL
PH UR STRIP: 5.5 (ref 5–9)
PLATELET # BLD AUTO: 237 K/UL (ref 150–450)
PMV BLD AUTO: 10 FL (ref 9.4–12.3)
POTASSIUM SERPL-SCNC: 4.3 MMOL/L (ref 3.5–5.1)
PROCALCITONIN SERPL-MCNC: 0.08 NG/ML (ref 0–0.1)
PROT SERPL-MCNC: 7.4 G/DL (ref 6.3–8.2)
PROT UR STRIP-MCNC: NEGATIVE MG/DL
RBC # BLD AUTO: 4.2 M/UL (ref 4.23–5.6)
RBC #/AREA URNS HPF: ABNORMAL /HPF
SODIUM SERPL-SCNC: 138 MMOL/L (ref 136–145)
SP GR UR REFRACTOMETRY: 1.02 (ref 1–1.02)
TROPONIN T SERPL HS-MCNC: 7.4 NG/L (ref 0–22)
UROBILINOGEN UR QL STRIP.AUTO: 0.2 EU/DL (ref 0.2–1)
WBC # BLD AUTO: 9.5 K/UL (ref 4.3–11.1)
WBC URNS QL MICRO: ABNORMAL /HPF

## 2025-08-20 PROCEDURE — 99285 EMERGENCY DEPT VISIT HI MDM: CPT

## 2025-08-20 PROCEDURE — 36415 COLL VENOUS BLD VENIPUNCTURE: CPT

## 2025-08-20 PROCEDURE — 86141 C-REACTIVE PROTEIN HS: CPT

## 2025-08-20 PROCEDURE — 81001 URINALYSIS AUTO W/SCOPE: CPT

## 2025-08-20 PROCEDURE — 96375 TX/PRO/DX INJ NEW DRUG ADDON: CPT

## 2025-08-20 PROCEDURE — 6370000000 HC RX 637 (ALT 250 FOR IP): Performed by: PHYSICIAN ASSISTANT

## 2025-08-20 PROCEDURE — 93971 EXTREMITY STUDY: CPT

## 2025-08-20 PROCEDURE — 2580000003 HC RX 258: Performed by: PHYSICIAN ASSISTANT

## 2025-08-20 PROCEDURE — 93010 ELECTROCARDIOGRAM REPORT: CPT | Performed by: INTERNAL MEDICINE

## 2025-08-20 PROCEDURE — 83605 ASSAY OF LACTIC ACID: CPT

## 2025-08-20 PROCEDURE — 87040 BLOOD CULTURE FOR BACTERIA: CPT

## 2025-08-20 PROCEDURE — 6360000002 HC RX W HCPCS: Performed by: PHYSICIAN ASSISTANT

## 2025-08-20 PROCEDURE — 72100 X-RAY EXAM L-S SPINE 2/3 VWS: CPT

## 2025-08-20 PROCEDURE — 85025 COMPLETE CBC W/AUTO DIFF WBC: CPT

## 2025-08-20 PROCEDURE — 84145 PROCALCITONIN (PCT): CPT

## 2025-08-20 PROCEDURE — 84484 ASSAY OF TROPONIN QUANT: CPT

## 2025-08-20 PROCEDURE — 96374 THER/PROPH/DIAG INJ IV PUSH: CPT

## 2025-08-20 PROCEDURE — 80053 COMPREHEN METABOLIC PANEL: CPT

## 2025-08-20 PROCEDURE — 93005 ELECTROCARDIOGRAM TRACING: CPT | Performed by: PHYSICIAN ASSISTANT

## 2025-08-20 PROCEDURE — 73562 X-RAY EXAM OF KNEE 3: CPT

## 2025-08-20 PROCEDURE — 87086 URINE CULTURE/COLONY COUNT: CPT

## 2025-08-20 PROCEDURE — 71046 X-RAY EXAM CHEST 2 VIEWS: CPT

## 2025-08-20 RX ORDER — 0.9 % SODIUM CHLORIDE 0.9 %
1000 INTRAVENOUS SOLUTION INTRAVENOUS ONCE
Status: COMPLETED | OUTPATIENT
Start: 2025-08-20 | End: 2025-08-20

## 2025-08-20 RX ORDER — LIDOCAINE 50 MG/G
1 PATCH TOPICAL DAILY
Qty: 30 PATCH | Refills: 0 | Status: SHIPPED | OUTPATIENT
Start: 2025-08-20 | End: 2025-09-19

## 2025-08-20 RX ORDER — 0.9 % SODIUM CHLORIDE 0.9 %
30 INTRAVENOUS SOLUTION INTRAVENOUS ONCE
Status: DISCONTINUED | OUTPATIENT
Start: 2025-08-20 | End: 2025-08-20

## 2025-08-20 RX ORDER — HYDROMORPHONE HYDROCHLORIDE 1 MG/ML
1 INJECTION, SOLUTION INTRAMUSCULAR; INTRAVENOUS; SUBCUTANEOUS
Status: DISCONTINUED | OUTPATIENT
Start: 2025-08-20 | End: 2025-08-20 | Stop reason: HOSPADM

## 2025-08-20 RX ORDER — LIDOCAINE 4 G/G
1 PATCH TOPICAL
Status: DISCONTINUED | OUTPATIENT
Start: 2025-08-20 | End: 2025-08-20 | Stop reason: HOSPADM

## 2025-08-20 RX ORDER — CEPHALEXIN 500 MG/1
500 CAPSULE ORAL 4 TIMES DAILY
Qty: 28 CAPSULE | Refills: 0 | Status: SHIPPED | OUTPATIENT
Start: 2025-08-20 | End: 2025-08-27

## 2025-08-20 RX ORDER — MORPHINE SULFATE 15 MG/1
15 TABLET ORAL EVERY 4 HOURS PRN
Qty: 9 TABLET | Refills: 0 | Status: SHIPPED | OUTPATIENT
Start: 2025-08-20 | End: 2025-08-23

## 2025-08-20 RX ORDER — KETOROLAC TROMETHAMINE 15 MG/ML
15 INJECTION, SOLUTION INTRAMUSCULAR; INTRAVENOUS ONCE
Status: COMPLETED | OUTPATIENT
Start: 2025-08-20 | End: 2025-08-20

## 2025-08-20 RX ORDER — DIAZEPAM 10 MG/2ML
5 INJECTION, SOLUTION INTRAMUSCULAR; INTRAVENOUS ONCE
Status: DISCONTINUED | OUTPATIENT
Start: 2025-08-20 | End: 2025-08-20

## 2025-08-20 RX ORDER — PREDNISONE 10 MG/1
TABLET ORAL
Qty: 45 TABLET | Refills: 0 | Status: SHIPPED | OUTPATIENT
Start: 2025-08-20 | End: 2025-09-04

## 2025-08-20 RX ORDER — DEXAMETHASONE SODIUM PHOSPHATE 10 MG/ML
10 INJECTION, SOLUTION INTRA-ARTICULAR; INTRALESIONAL; INTRAMUSCULAR; INTRAVENOUS; SOFT TISSUE ONCE
Status: COMPLETED | OUTPATIENT
Start: 2025-08-20 | End: 2025-08-20

## 2025-08-20 RX ADMIN — KETOROLAC TROMETHAMINE 15 MG: 15 INJECTION, SOLUTION INTRAMUSCULAR; INTRAVENOUS at 11:26

## 2025-08-20 RX ADMIN — DEXAMETHASONE SODIUM PHOSPHATE 10 MG: 10 INJECTION INTRAMUSCULAR; INTRAVENOUS at 14:53

## 2025-08-20 RX ADMIN — SODIUM CHLORIDE 1000 ML: 0.9 INJECTION, SOLUTION INTRAVENOUS at 11:29

## 2025-08-20 ASSESSMENT — PAIN DESCRIPTION - DESCRIPTORS: DESCRIPTORS: ACHING;SQUEEZING

## 2025-08-20 ASSESSMENT — PAIN - FUNCTIONAL ASSESSMENT
PAIN_FUNCTIONAL_ASSESSMENT: 0-10
PAIN_FUNCTIONAL_ASSESSMENT: 0-10

## 2025-08-20 ASSESSMENT — PAIN SCALES - GENERAL
PAINLEVEL_OUTOF10: 10
PAINLEVEL_OUTOF10: 0
PAINLEVEL_OUTOF10: 0

## 2025-08-20 ASSESSMENT — PAIN DESCRIPTION - ORIENTATION: ORIENTATION: RIGHT

## 2025-08-20 ASSESSMENT — PAIN DESCRIPTION - LOCATION: LOCATION: KNEE

## 2025-08-21 ENCOUNTER — CARE COORDINATION (OUTPATIENT)
Dept: CARE COORDINATION | Facility: CLINIC | Age: 80
End: 2025-08-21

## 2025-08-22 ENCOUNTER — OFFICE VISIT (OUTPATIENT)
Dept: INTERNAL MEDICINE CLINIC | Facility: CLINIC | Age: 80
End: 2025-08-22

## 2025-08-22 ENCOUNTER — OFFICE VISIT (OUTPATIENT)
Dept: ORTHOPEDIC SURGERY | Age: 80
End: 2025-08-22

## 2025-08-22 VITALS
BODY MASS INDEX: 29.6 KG/M2 | OXYGEN SATURATION: 93 % | HEIGHT: 70 IN | TEMPERATURE: 97.9 F | HEART RATE: 61 BPM | SYSTOLIC BLOOD PRESSURE: 144 MMHG | RESPIRATION RATE: 16 BRPM | DIASTOLIC BLOOD PRESSURE: 75 MMHG | WEIGHT: 206.8 LBS

## 2025-08-22 DIAGNOSIS — G62.9 PERIPHERAL POLYNEUROPATHY: ICD-10-CM

## 2025-08-22 DIAGNOSIS — Z96.651 S/P TOTAL KNEE ARTHROPLASTY, RIGHT: Primary | ICD-10-CM

## 2025-08-22 DIAGNOSIS — I67.9 CEREBROVASCULAR DISEASE: ICD-10-CM

## 2025-08-22 DIAGNOSIS — H90.71 MIXED CONDUCTIVE AND SENSORINEURAL HEARING LOSS OF RIGHT EAR, UNSPECIFIED HEARING STATUS ON CONTRALATERAL SIDE: ICD-10-CM

## 2025-08-22 DIAGNOSIS — F33.2 SEVERE EPISODE OF RECURRENT MAJOR DEPRESSIVE DISORDER, WITHOUT PSYCHOTIC FEATURES (HCC): ICD-10-CM

## 2025-08-22 DIAGNOSIS — K21.9 GASTROESOPHAGEAL REFLUX DISEASE WITHOUT ESOPHAGITIS: ICD-10-CM

## 2025-08-22 DIAGNOSIS — I10 PRIMARY HYPERTENSION: Primary | ICD-10-CM

## 2025-08-22 DIAGNOSIS — N40.1 BENIGN PROSTATIC HYPERPLASIA WITH LOWER URINARY TRACT SYMPTOMS, SYMPTOM DETAILS UNSPECIFIED: ICD-10-CM

## 2025-08-22 DIAGNOSIS — R41.3 MEMORY LOSS: ICD-10-CM

## 2025-08-22 DIAGNOSIS — I65.23 BILATERAL CAROTID ARTERY STENOSIS: ICD-10-CM

## 2025-08-22 LAB
BACTERIA SPEC CULT: NORMAL
SERVICE CMNT-IMP: NORMAL

## 2025-08-22 PROCEDURE — 99024 POSTOP FOLLOW-UP VISIT: CPT | Performed by: PHYSICIAN ASSISTANT

## 2025-08-22 RX ORDER — ASPIRIN 325 MG
325 TABLET ORAL DAILY
COMMUNITY

## 2025-08-22 RX ORDER — MEMANTINE HYDROCHLORIDE 5 MG/1
5 TABLET ORAL 2 TIMES DAILY
Qty: 60 TABLET | Refills: 3 | Status: SHIPPED | OUTPATIENT
Start: 2025-08-22

## 2025-08-22 ASSESSMENT — PATIENT HEALTH QUESTIONNAIRE - PHQ9
10. IF YOU CHECKED OFF ANY PROBLEMS, HOW DIFFICULT HAVE THESE PROBLEMS MADE IT FOR YOU TO DO YOUR WORK, TAKE CARE OF THINGS AT HOME, OR GET ALONG WITH OTHER PEOPLE: NOT DIFFICULT AT ALL
SUM OF ALL RESPONSES TO PHQ QUESTIONS 1-9: 8
5. POOR APPETITE OR OVEREATING: NOT AT ALL
SUM OF ALL RESPONSES TO PHQ QUESTIONS 1-9: 8
8. MOVING OR SPEAKING SO SLOWLY THAT OTHER PEOPLE COULD HAVE NOTICED. OR THE OPPOSITE, BEING SO FIGETY OR RESTLESS THAT YOU HAVE BEEN MOVING AROUND A LOT MORE THAN USUAL: NOT AT ALL
3. TROUBLE FALLING OR STAYING ASLEEP: NEARLY EVERY DAY
SUM OF ALL RESPONSES TO PHQ QUESTIONS 1-9: 7
2. FEELING DOWN, DEPRESSED OR HOPELESS: SEVERAL DAYS
4. FEELING TIRED OR HAVING LITTLE ENERGY: MORE THAN HALF THE DAYS
7. TROUBLE CONCENTRATING ON THINGS, SUCH AS READING THE NEWSPAPER OR WATCHING TELEVISION: SEVERAL DAYS
9. THOUGHTS THAT YOU WOULD BE BETTER OFF DEAD, OR OF HURTING YOURSELF: SEVERAL DAYS
1. LITTLE INTEREST OR PLEASURE IN DOING THINGS: NOT AT ALL
6. FEELING BAD ABOUT YOURSELF - OR THAT YOU ARE A FAILURE OR HAVE LET YOURSELF OR YOUR FAMILY DOWN: NOT AT ALL
SUM OF ALL RESPONSES TO PHQ QUESTIONS 1-9: 8

## 2025-08-22 ASSESSMENT — COLUMBIA-SUICIDE SEVERITY RATING SCALE - C-SSRS
6. HAVE YOU EVER DONE ANYTHING, STARTED TO DO ANYTHING, OR PREPARED TO DO ANYTHING TO END YOUR LIFE?: NO
2. HAVE YOU ACTUALLY HAD ANY THOUGHTS OF KILLING YOURSELF?: NO
1. WITHIN THE PAST MONTH, HAVE YOU WISHED YOU WERE DEAD OR WISHED YOU COULD GO TO SLEEP AND NOT WAKE UP?: NO

## 2025-08-24 LAB
BACTERIA SPEC CULT: NORMAL
BACTERIA SPEC CULT: NORMAL
SERVICE CMNT-IMP: NORMAL
SERVICE CMNT-IMP: NORMAL

## 2025-08-25 LAB
BACTERIA SPEC CULT: NORMAL
BACTERIA SPEC CULT: NORMAL
SERVICE CMNT-IMP: NORMAL
SERVICE CMNT-IMP: NORMAL

## (undated) DEVICE — Device

## (undated) DEVICE — SUT CHRMC 4-0 27IN RB1 BRN --

## (undated) DEVICE — TUBING, SUCTION, 1/4" X 10', STRAIGHT: Brand: MEDLINE

## (undated) DEVICE — DRAPE EQUIP SATELLITE STN STRL VELYS

## (undated) DEVICE — GOWN,REINFORCED,POLY,SIRUS,XLNG/XXLG: Brand: MEDLINE

## (undated) DEVICE — HEAD AND NECK: Brand: MEDLINE INDUSTRIES, INC.

## (undated) DEVICE — BALLOON SINUPLASTY 6X16 MM SYS RELIEVA SPINPLUS

## (undated) DEVICE — DEVICE INFL PRSS G INDIC DISP (MUST BE PURC IN MULTIPLES OF 5)

## (undated) DEVICE — STERILE SYNTHETIC POLYISOPRENE POWDER-FREE SURGICAL GLOVES WITH HYDROGEL COATING, SMOOTH FINISH, STRAIGHT FINGER: Brand: PROTEXIS

## (undated) DEVICE — GLOVE SURG SZ 65 THK91MIL LTX FREE SYN POLYISOPRENE

## (undated) DEVICE — SOLUTION IV 1000ML 0.9% SOD CHL

## (undated) DEVICE — SUT ETHLN 3-0 18IN PS1 BLK --

## (undated) DEVICE — GLOVE SURG SZ 8 L12IN FNGR THK79MIL GRN LTX FREE

## (undated) DEVICE — SUTURE VICRYL + SZ 1 L27IN ABSRB VLT CP-1 1/2 CIR REV CUT NDL VCP268H

## (undated) DEVICE — GLOVE ORANGE PI 8   MSG9080

## (undated) DEVICE — PIN DRL 4X125 MM ROBOTIC-ASSISTED SOLUTION ARRY VELYS

## (undated) DEVICE — PACKING 8004008 NEURAY 200PK 25X76MM: Brand: NEURAY ®

## (undated) DEVICE — BLADE SAW OSCILLATING 85X19X2 MM ROBOTIC-ASSISTED VELYS

## (undated) DEVICE — ZIP DS DRESSING SHIELD: Brand: ZIP DS DRESSING SHIELD

## (undated) DEVICE — GLOVE SURG SZ 65 L12IN FNGR THK79MIL GRN LTX FREE

## (undated) DEVICE — REM POLYHESIVE ADULT PATIENT RETURN ELECTRODE: Brand: VALLEYLAB

## (undated) DEVICE — SYSTEM BLLN DIL L16MM DIA6MM FOR EUSTACHIAN TB

## (undated) DEVICE — SOLUTION WND IRRIGATION 450 ML 0.5 PVP-I 0.9 NACL

## (undated) DEVICE — SUREFIT, DUAL DISPERSIVE ELECTRODE, CONTACT QUALITY MONITOR: Brand: SUREFIT

## (undated) DEVICE — SOLUTION IV 250ML 0.9% SOD CHL PH 5 INJ USP VIAFLX PLAS

## (undated) DEVICE — HOOD: Brand: T7PLUS

## (undated) DEVICE — BIPOLAR SEALER 23-112-1 AQM 6.0: Brand: AQUAMANTYS ®

## (undated) DEVICE — BASIC SINGLE BASIN 2-LF: Brand: MEDLINE INDUSTRIES, INC.

## (undated) DEVICE — TOTAL KNEE: Brand: MEDLINE INDUSTRIES, INC.

## (undated) DEVICE — SUTURE VCRL SZ 4-0 L27IN ABSRB UD L17MM RB-1 1/2 CIR J214H

## (undated) DEVICE — SOLUTION IRRIG 3000ML 0.9% SOD CHL USP UROMATIC PLAS CONT

## (undated) DEVICE — GLOVE SURG SZ 75 CRM LTX FREE POLYISOPRENE POLYMER BEAD ANTI

## (undated) DEVICE — 2000CC GUARDIAN II: Brand: GUARDIAN

## (undated) DEVICE — SUTURE ABS ANTIBACT 1-0 CTX 24IN STRATAFIX PDS+ SXPP1A445

## (undated) DEVICE — DRAPE EQUIP ROBOT STRL VELYS 20/PK ORDER IN MULTIIPLES OF 20 EACH

## (undated) DEVICE — SUIT SURG ISOLATN ZIP TOGA XL T7 +

## (undated) DEVICE — STERILE PRESSURE PROTECTOR PAD® FOR DE MAYO UNIVERSAL DISTRACTOR® (10/CASE): Brand: DE MAYO UNIVERSAL DISTRACTOR®

## (undated) DEVICE — SYR LR LCK 1ML GRAD NSAF 30ML --

## (undated) DEVICE — SOL ANTI-FOG 6ML MEDC -- MEDICHOICE - CONVERT TO 358427

## (undated) DEVICE — BLADE 1882040 5PK INFERIOR TURB 2MM

## (undated) DEVICE — BLADE 1884004HR TRICUT 5PK M4 4MM ROTATE: Brand: TRICUT

## (undated) DEVICE — SPLINT NSL SEPTAL SUPP REG PRE PUNCHED HOLE SIL STRL BRTH EZ

## (undated) DEVICE — DUAL CUT SAGITTAL BLADE

## (undated) DEVICE — AGENT HEMSTAT W1XL2IN ABSRB SFT LTWT LAYERED ORC FIBRILLAR

## (undated) DEVICE — KIT PROCEDURE SURG HEAD AND NECK TOTE

## (undated) DEVICE — CODMAN® SURGICAL PATTIES 1" X 3" (2.54CM X 7.62CM): Brand: CODMAN®

## (undated) DEVICE — SOLUTION IRRIG 1000ML STRL H2O USP PLAS POUR BTL

## (undated) DEVICE — SOLUTION IRRIG 1000ML 0.9% SOD CHL USP POUR PLAS BTL

## (undated) DEVICE — MEDI-VAC NON-CONDUCTIVE SUCTION TUBING: Brand: CARDINAL HEALTH

## (undated) DEVICE — SYRINGE MED 50ML LUERLOCK TIP

## (undated) DEVICE — SUTURE VICRYL + SZ 2-0 L27IN ABSRB UD CP-1 1/2 CIR REV CUT VCP266H